# Patient Record
Sex: FEMALE | Race: OTHER | HISPANIC OR LATINO
[De-identification: names, ages, dates, MRNs, and addresses within clinical notes are randomized per-mention and may not be internally consistent; named-entity substitution may affect disease eponyms.]

---

## 2017-01-12 ENCOUNTER — APPOINTMENT (OUTPATIENT)
Dept: INTERNAL MEDICINE | Facility: CLINIC | Age: 47
End: 2017-01-12

## 2017-01-12 VITALS
OXYGEN SATURATION: 98 % | DIASTOLIC BLOOD PRESSURE: 78 MMHG | SYSTOLIC BLOOD PRESSURE: 108 MMHG | BODY MASS INDEX: 34.6 KG/M2 | WEIGHT: 188 LBS | TEMPERATURE: 98.3 F | HEART RATE: 82 BPM | HEIGHT: 62 IN

## 2017-01-12 LAB — HBA1C MFR BLD HPLC: 5.2

## 2017-01-24 ENCOUNTER — APPOINTMENT (OUTPATIENT)
Dept: PULMONOLOGY | Facility: CLINIC | Age: 47
End: 2017-01-24

## 2017-01-24 VITALS
HEIGHT: 62 IN | TEMPERATURE: 98.5 F | HEART RATE: 75 BPM | SYSTOLIC BLOOD PRESSURE: 100 MMHG | RESPIRATION RATE: 16 BRPM | DIASTOLIC BLOOD PRESSURE: 80 MMHG

## 2017-03-09 ENCOUNTER — APPOINTMENT (OUTPATIENT)
Dept: INTERNAL MEDICINE | Facility: CLINIC | Age: 47
End: 2017-03-09

## 2017-03-11 ENCOUNTER — EMERGENCY (EMERGENCY)
Facility: HOSPITAL | Age: 47
LOS: 1 days | Discharge: HOME | End: 2017-03-11
Attending: EMERGENCY MEDICINE | Admitting: EMERGENCY MEDICINE
Payer: MEDICARE

## 2017-03-11 VITALS
OXYGEN SATURATION: 100 % | RESPIRATION RATE: 16 BRPM | TEMPERATURE: 98 F | HEART RATE: 85 BPM | SYSTOLIC BLOOD PRESSURE: 111 MMHG | DIASTOLIC BLOOD PRESSURE: 76 MMHG

## 2017-03-11 PROCEDURE — 93010 ELECTROCARDIOGRAM REPORT: CPT

## 2017-03-11 PROCEDURE — 99284 EMERGENCY DEPT VISIT MOD MDM: CPT | Mod: 25

## 2017-03-11 RX ORDER — KETOROLAC TROMETHAMINE 30 MG/ML
15 SYRINGE (ML) INJECTION ONCE
Qty: 0 | Refills: 0 | Status: DISCONTINUED | OUTPATIENT
Start: 2017-03-11 | End: 2017-03-11

## 2017-03-11 RX ORDER — ACETAMINOPHEN 500 MG
650 TABLET ORAL ONCE
Qty: 0 | Refills: 0 | Status: DISCONTINUED | OUTPATIENT
Start: 2017-03-11 | End: 2017-03-11

## 2017-03-11 RX ORDER — IBUPROFEN 200 MG
600 TABLET ORAL ONCE
Qty: 0 | Refills: 0 | Status: DISCONTINUED | OUTPATIENT
Start: 2017-03-11 | End: 2017-03-11

## 2017-03-11 RX ADMIN — Medication 15 MILLIGRAM(S): at 10:27

## 2017-03-11 RX ADMIN — Medication 15 MILLIGRAM(S): at 10:02

## 2017-03-11 NOTE — ED PROVIDER NOTE - CHPI ED SYMPTOMS NEG
no fever/no vomiting/no tingling/no dizziness/no decreased eating/drinking/no numbness/no weakness/no chills/no nausea

## 2017-03-11 NOTE — ED PROVIDER NOTE - MUSCULOSKELETAL NEGATIVE STATEMENT, MLM
+ right shoulder pain, no back pain, no gout, no musculoskeletal pain, no neck pain, and no weakness.

## 2017-03-11 NOTE — ED PROVIDER NOTE - ATTENDING CONTRIBUTION TO CARE
attest I performed a face to face evaluation of this patient and obtained a history and performed a full exam.  I agree with the history, physical exam and plan of the PA.  pt with neck and shoulder pain, no cp, no sob.  HEENT atc, nl, right paraspinal neck pain, shoulder FROM, heart and lung wnl, 2+ pulses and neuro wnl.  Pain meds, EKG and reassess

## 2017-03-11 NOTE — ED PROVIDER NOTE - CARE PLAN
Principal Discharge DX:	Shoulder pain  Instructions for follow-up, activity and diet:	Rest, drink plenty of fluids.  Advance activity as tolerated.  Continue all previously prescribed medications as directed. You can use motrin 600mg every 6-8 hours for pain or fever, and/or Tylenol 650 mg every 4 hours for pain/fever. Follow up with your primary care physician in 48-72 hours- bring copies of your results.  Return to the emergency department for chest pain, shortness of breath, dizziness, or worsening, concerning, or persistent symptoms.

## 2017-03-11 NOTE — ED PROVIDER NOTE - OBJECTIVE STATEMENT
48 yo F pmhx of asthma, arthritis, "heart failure" here for R arm and shoulder pain x 6 days. Pt reports that 6 days ago she began to have R sided shoulder/neck pain. Cannot recall direct injury or trauma or change in activity.  Describes pain as throbbing, worse with movement, and tenderness to the "top" of her shoulder.  Has not taken any meds for the pain. Reports having a hx of chronic back pain which she has oxycodone for at home but has not needed to take the medication for this pain. Pt reports several months ago she had similar symptoms which were associated with weakness and she came to the ED with a negative work up and was told it was likely a nerve injury.  Denies other complaints. Denies fever, chills, vomiting, SOB, CP, weakness, HA, difficulty breathing, difficulty swallowing, numbness, tingling.

## 2017-03-11 NOTE — ED PROVIDER NOTE - PROGRESS NOTE DETAILS
BARBARA Adorno: l/m with Dr. Kemp office. Pt feelings improved, stable for discharge, will follow up with PMD/cadio as needed.

## 2017-03-11 NOTE — ED PROVIDER NOTE - PSH
Fracture of left foot  Four surgeries: #1 and #2 in 2004, #3 ~2010, and #4 in 2011.  Gastric bypass status for obesity    H/O tubal ligation

## 2017-03-11 NOTE — ED PROVIDER NOTE - MEDICAL DECISION MAKING DETAILS
46 yo F pmhx of arthritis, CHF, asthma here for R shoulder pain x 6 days. otherwise well. no evidence of septic arthritis, infection. no evidence of direct trauma or injury. normal exam other than mild TTP over posterior aspect of R shoulder. Pt otherwise well. Has good PMD and Cardio follow up. PLAN: 46 yo F pmhx of arthritis, CHF, asthma here for R shoulder pain x 6 days. otherwise well. no evidence of septic arthritis, infection. no evidence of direct trauma or injury. normal exam other than mild TTP over posterior aspect of R shoulder. Pt otherwise well.  Although pt has hx of CHF, patient has no symptoms or SOB or CP at this time. Pain is reproducible likely msk. Has good PMD and Cardio follow up. PLAN: ekg, NSAIDs, PMD follow up 46 yo F pmhx of arthritis, CHF, asthma here for R shoulder pain x 6 days. otherwise well. no evidence of septic arthritis, infection. no evidence of direct trauma or injury. normal exam other than mild TTP over posterior aspect of R shoulder. Pt otherwise well.  Although pt has hx of CHF, patient has no symptoms or SOB or CP at this time. Pain is reproducible likely msk. Has good PMD and Cardio follow up. EKG WNL. PLAN: ekg, NSAIDs, PMD follow up

## 2017-03-11 NOTE — ED PROVIDER NOTE - PHYSICAL EXAMINATION
R shoulder: mild TTP over posterior shoulder, FROM, NVI, sensate intact, strength 5/5, negative drop arm test.    NEURO: EOMi, PERRLA, visual fields intact, tongue midline, negative romberg, strength 5/5 UE and LE bilaterally, normal gait, facial expressions intact, point to point intact, rapid alternating movements intact, sensate intact to UE and LE bilaterally. NVI R shoulder: mild TTP over posterior shoulder, FROM, NVI, sensate intact, strength 5/5, negative drop arm test. No swelling/erythema. No ecchymosis.     NEURO: EOMi, PERRLA, visual fields intact, tongue midline, negative romberg, strength 5/5 UE and LE bilaterally, normal gait, facial expressions intact, point to point intact, rapid alternating movements intact, sensate intact to UE and LE bilaterally. NVI

## 2017-03-11 NOTE — ED ADULT TRIAGE NOTE - CHIEF COMPLAINT QUOTE
Patient c/o pain in her right upper arm that goes up to her shoulder and neck area. Denies any trauma or unusual activity with her arm. The pain started 6 days ago but it is getting worse.

## 2017-03-15 ENCOUNTER — MEDICATION RENEWAL (OUTPATIENT)
Age: 47
End: 2017-03-15

## 2017-04-27 ENCOUNTER — APPOINTMENT (OUTPATIENT)
Dept: INTERNAL MEDICINE | Facility: CLINIC | Age: 47
End: 2017-04-27

## 2017-04-27 ENCOUNTER — LABORATORY RESULT (OUTPATIENT)
Age: 47
End: 2017-04-27

## 2017-04-27 VITALS
WEIGHT: 187 LBS | DIASTOLIC BLOOD PRESSURE: 60 MMHG | BODY MASS INDEX: 34.41 KG/M2 | HEIGHT: 62 IN | TEMPERATURE: 98.4 F | SYSTOLIC BLOOD PRESSURE: 104 MMHG | HEART RATE: 77 BPM | RESPIRATION RATE: 14 BRPM

## 2017-04-27 DIAGNOSIS — M25.519 PAIN IN UNSPECIFIED SHOULDER: ICD-10-CM

## 2017-04-28 LAB
25(OH)D3 SERPL-MCNC: 26.4 NG/ML
ALBUMIN SERPL ELPH-MCNC: 4 G/DL
ALP BLD-CCNC: 77 U/L
ALT SERPL-CCNC: 21 U/L
ANION GAP SERPL CALC-SCNC: 15 MMOL/L
APPEARANCE: CLEAR
AST SERPL-CCNC: 23 U/L
B BURGDOR IGG+IGM SER QL IB: NORMAL
BASOPHILS # BLD AUTO: 0.02 K/UL
BASOPHILS NFR BLD AUTO: 0.3 %
BILIRUB SERPL-MCNC: 0.2 MG/DL
BILIRUBIN URINE: NEGATIVE
BLOOD URINE: NEGATIVE
BUN SERPL-MCNC: 9 MG/DL
CALCIUM SERPL-MCNC: 9.4 MG/DL
CCP AB SER IA-ACNC: <8 UNITS
CHLORIDE SERPL-SCNC: 101 MMOL/L
CHOLEST SERPL-MCNC: 178 MG/DL
CHOLEST/HDLC SERPL: 2.5 RATIO
CO2 SERPL-SCNC: 24 MMOL/L
COLOR: YELLOW
CREAT SERPL-MCNC: 0.73 MG/DL
CRP SERPL HS-MCNC: 5.2 MG/L
DSDNA AB SER-ACNC: <12 IU/ML
EOSINOPHIL # BLD AUTO: 0.1 K/UL
EOSINOPHIL NFR BLD AUTO: 1.3 %
ERYTHROCYTE [SEDIMENTATION RATE] IN BLOOD BY WESTERGREN METHOD: 21 MM/HR
GLUCOSE QUALITATIVE U: NORMAL MG/DL
GLUCOSE SERPL-MCNC: 93 MG/DL
HBA1C MFR BLD HPLC: 5.5 %
HBV SURFACE AB SER QL: NONREACTIVE
HBV SURFACE AG SER QL: NONREACTIVE
HCT VFR BLD CALC: 41.2 %
HCV AB SER QL: NONREACTIVE
HCV S/CO RATIO: 0.1 S/CO
HDLC SERPL-MCNC: 70 MG/DL
HGB BLD-MCNC: 13 G/DL
HIV1+2 AB SPEC QL IA.RAPID: NONREACTIVE
IMM GRANULOCYTES NFR BLD AUTO: 0 %
KETONES URINE: NEGATIVE
LDLC SERPL CALC-MCNC: 89 MG/DL
LEUKOCYTE ESTERASE URINE: NEGATIVE
LYMPHOCYTES # BLD AUTO: 2.11 K/UL
LYMPHOCYTES NFR BLD AUTO: 26.9 %
MAN DIFF?: NORMAL
MCHC RBC-ENTMCNC: 27.8 PG
MCHC RBC-ENTMCNC: 31.6 GM/DL
MCV RBC AUTO: 88 FL
MONOCYTES # BLD AUTO: 0.51 K/UL
MONOCYTES NFR BLD AUTO: 6.5 %
NEUTROPHILS # BLD AUTO: 5.11 K/UL
NEUTROPHILS NFR BLD AUTO: 65 %
NITRITE URINE: NEGATIVE
PH URINE: 5.5
PLATELET # BLD AUTO: 380 K/UL
POTASSIUM SERPL-SCNC: 4.5 MMOL/L
PROT SERPL-MCNC: 7.3 G/DL
PROTEIN URINE: NEGATIVE MG/DL
RBC # BLD: 4.68 M/UL
RBC # FLD: 13.6 %
RF+CCP IGG SER-IMP: NEGATIVE
RHEUMATOID FACT SER QL: 7.1 IU/ML
SODIUM SERPL-SCNC: 140 MMOL/L
SPECIFIC GRAVITY URINE: 1.03
T PALLIDUM AB SER QL IA: NEGATIVE
TRIGL SERPL-MCNC: 97 MG/DL
TSH SERPL-ACNC: 0.68 UIU/ML
UROBILINOGEN URINE: NORMAL MG/DL
VIT B12 SERPL-MCNC: 558 PG/ML
WBC # FLD AUTO: 7.85 K/UL

## 2017-05-01 LAB
ANA SER IF-ACNC: NEGATIVE
C TRACH RRNA SPEC QL NAA+PROBE: NORMAL
N GONORRHOEA RRNA SPEC QL NAA+PROBE: NORMAL
SOURCE AMPLIFICATION: NORMAL

## 2017-05-31 ENCOUNTER — FORM ENCOUNTER (OUTPATIENT)
Age: 47
End: 2017-05-31

## 2017-06-01 ENCOUNTER — APPOINTMENT (OUTPATIENT)
Dept: MRI IMAGING | Facility: IMAGING CENTER | Age: 47
End: 2017-06-01

## 2017-06-01 ENCOUNTER — APPOINTMENT (OUTPATIENT)
Dept: RADIOLOGY | Facility: IMAGING CENTER | Age: 47
End: 2017-06-01

## 2017-06-01 ENCOUNTER — OUTPATIENT (OUTPATIENT)
Dept: OUTPATIENT SERVICES | Facility: HOSPITAL | Age: 47
LOS: 1 days | End: 2017-06-01
Payer: MEDICAID

## 2017-06-01 ENCOUNTER — APPOINTMENT (OUTPATIENT)
Dept: MAMMOGRAPHY | Facility: IMAGING CENTER | Age: 47
End: 2017-06-01

## 2017-06-01 DIAGNOSIS — M51.26 OTHER INTERVERTEBRAL DISC DISPLACEMENT, LUMBAR REGION: ICD-10-CM

## 2017-06-01 DIAGNOSIS — Z00.00 ENCOUNTER FOR GENERAL ADULT MEDICAL EXAMINATION WITHOUT ABNORMAL FINDINGS: ICD-10-CM

## 2017-06-01 DIAGNOSIS — M51.36 OTHER INTERVERTEBRAL DISC DEGENERATION, LUMBAR REGION: ICD-10-CM

## 2017-06-01 DIAGNOSIS — M25.551 PAIN IN RIGHT HIP: ICD-10-CM

## 2017-06-01 DIAGNOSIS — Z12.31 ENCOUNTER FOR SCREENING MAMMOGRAM FOR MALIGNANT NEOPLASM OF BREAST: ICD-10-CM

## 2017-06-01 DIAGNOSIS — M47.816 SPONDYLOSIS WITHOUT MYELOPATHY OR RADICULOPATHY, LUMBAR REGION: ICD-10-CM

## 2017-06-01 DIAGNOSIS — M25.552 PAIN IN LEFT HIP: ICD-10-CM

## 2017-06-01 DIAGNOSIS — N64.89 OTHER SPECIFIED DISORDERS OF BREAST: ICD-10-CM

## 2017-06-01 PROCEDURE — 73521 X-RAY EXAM HIPS BI 2 VIEWS: CPT

## 2017-06-01 PROCEDURE — 77067 SCR MAMMO BI INCL CAD: CPT

## 2017-06-01 PROCEDURE — 73564 X-RAY EXAM KNEE 4 OR MORE: CPT

## 2017-06-01 PROCEDURE — 72148 MRI LUMBAR SPINE W/O DYE: CPT

## 2017-06-01 PROCEDURE — 77063 BREAST TOMOSYNTHESIS BI: CPT

## 2017-06-01 PROCEDURE — 76641 ULTRASOUND BREAST COMPLETE: CPT

## 2017-09-05 ENCOUNTER — MEDICATION RENEWAL (OUTPATIENT)
Age: 47
End: 2017-09-05

## 2018-05-02 ENCOUNTER — EMERGENCY (EMERGENCY)
Facility: HOSPITAL | Age: 48
LOS: 1 days | Discharge: HOME | End: 2018-05-02
Attending: EMERGENCY MEDICINE | Admitting: EMERGENCY MEDICINE
Payer: MEDICARE

## 2018-05-02 ENCOUNTER — APPOINTMENT (OUTPATIENT)
Dept: PULMONOLOGY | Facility: CLINIC | Age: 48
End: 2018-05-02

## 2018-05-02 VITALS
SYSTOLIC BLOOD PRESSURE: 122 MMHG | RESPIRATION RATE: 18 BRPM | DIASTOLIC BLOOD PRESSURE: 83 MMHG | HEART RATE: 101 BPM | TEMPERATURE: 98 F | OXYGEN SATURATION: 99 %

## 2018-05-02 PROCEDURE — 73610 X-RAY EXAM OF ANKLE: CPT | Mod: 26,RT

## 2018-05-02 PROCEDURE — 99283 EMERGENCY DEPT VISIT LOW MDM: CPT

## 2018-05-02 PROCEDURE — 73110 X-RAY EXAM OF WRIST: CPT | Mod: 26,RT

## 2018-05-02 NOTE — ED PROVIDER NOTE - MEDICAL DECISION MAKING DETAILS
47 y/o F w/ right ankle and right wrist pain s/p mechanical fall yesterday. Will obtain XR to r/o fracture, pain control and reassess.

## 2018-05-02 NOTE — ED PROVIDER NOTE - PROGRESS NOTE DETAILS
BARBARA Florentino: Discussed the results of the imaging with the patient. Placed aircast. Advised to f/u with PCP. Pt ok for dc. BARBARA Florentino: Discussed the results of the imaging with the patient. Placed aircast - gave cane. Advised to f/u with PCP. Pt ok for dc. Pt ambulating with cane.

## 2018-05-02 NOTE — ED PROVIDER NOTE - OBJECTIVE STATEMENT
47 y/o F w/ CHF, asthma and migraines, presents to the ED c/o right ankle and right wrist pain and abrasions to left knee s/p trip and fall yesterday. Pt states she was walking on the sidewalk, tripped over uneven elyssa and fell onto outstretched right hand and left knee. Pt is currently ambulatory. Pt is right hand dominant. Denies LOC, head trauma or any other complaints.

## 2018-05-02 NOTE — ED PROVIDER NOTE - CARE PLAN
Principal Discharge DX:	Ankle sprain  Secondary Diagnosis:	Wrist sprain, right, initial encounter  Secondary Diagnosis:	Fall Principal Discharge DX:	Ankle sprain  Assessment and plan of treatment:	Limit further injury, over exertion, and rest affected area. Take motrin 600mg every 6h as needed for pain. Please continue all home medications as directed. See your regular doctor within 72 hours for follow-up care. Return to ER for new or worsening symptoms.  Secondary Diagnosis:	Wrist sprain, right, initial encounter  Secondary Diagnosis:	Fall

## 2018-05-02 NOTE — ED ADULT TRIAGE NOTE - CHIEF COMPLAINT QUOTE
pt tripped on side walk yesterday landing on her right arm. Twisted right ankle and reports right wrist pain. no swelling or deformity to wrist noted.

## 2018-05-10 ENCOUNTER — APPOINTMENT (OUTPATIENT)
Dept: INTERNAL MEDICINE | Facility: CLINIC | Age: 48
End: 2018-05-10

## 2018-10-19 ENCOUNTER — APPOINTMENT (OUTPATIENT)
Dept: INTERNAL MEDICINE | Facility: CLINIC | Age: 48
End: 2018-10-19

## 2018-10-25 ENCOUNTER — APPOINTMENT (OUTPATIENT)
Dept: INTERNAL MEDICINE | Facility: CLINIC | Age: 48
End: 2018-10-25
Payer: MEDICARE

## 2018-10-25 VITALS
HEIGHT: 62 IN | BODY MASS INDEX: 35.7 KG/M2 | DIASTOLIC BLOOD PRESSURE: 74 MMHG | HEART RATE: 106 BPM | WEIGHT: 194 LBS | TEMPERATURE: 97.8 F | OXYGEN SATURATION: 98 % | SYSTOLIC BLOOD PRESSURE: 112 MMHG

## 2018-10-25 DIAGNOSIS — M54.5 LOW BACK PAIN: ICD-10-CM

## 2018-10-25 PROCEDURE — 99214 OFFICE O/P EST MOD 30 MIN: CPT

## 2018-10-25 NOTE — HISTORY OF PRESENT ILLNESS
[FreeTextEntry1] : came in as acute \par was last seen 4/2017  [de-identified] : right upper back and shoulder pain - lower back chronic with acute exacerbation on rt side where she cannot lift her rt shoulder \par - no radiation \par - also had chest discomfort saw her cardio Dr Víctor licea 2 weeks ago was told it due to withdrawal from her oxycodone - she was getting oxycodone from her pain management that closed down Dr Mariely Chavez [phone] - called office no answer \par - she was on oxycodone 30 mg was taking 3 a day -[facility] checked last fill 10/5 28 pills for 7 days \par -as per letter dated 1/10/2017  from pain management she was treated for multiple DJD joints disease /OA b/l hip , knee , left ankle , lumbar spine radiculopathy , spinal stenosis , neurogenic claudications \par \par depression \par - stopped Zoloft 3 yrs - taking xanax 2 mg from cardio for anxiety -  feeling much better\par -due to older son with ADHD and social security disability due to back - she tried going back to work acces a ride - her back gave way again after she wheeled a pt , her pain management cleared her for work with restriction and they don’t want restriction \par -crying a lot does not want to go out , wants to be alone \par -she was on Zoloft in past it worked for her \par her cardio put her on xanax due to anxiety \par \par lower back pain - needs referral to pain management taking oxycodone for pain, not on  gabapentin now \par \par h/o unstable angina / cardiomyopathy-- followed by cardiologist Dr Víctor Garzon at [facility] , last visit was 2 weeks ago  - records requested \par -on Core[facility] after pulmonary evaluation, \par -does get chest pains in extreme stressed situations , has family stress- elder son ADHD and bipolar not getting treatments she got order of protection against him.. \par \par h/o Abnormal mammo h/o rt breast core biopsy. 2014-- did mammo 4/2016 stable - referral given today \par \par Morbid Obesity- did gastric bypass in November 2013 , after lost 42 lbs since. Her asthma, arthritis, and sleeping is much better since her weight loss.\par \par Asthma - acting up due to weather , feels better since gastric bypass surgery, using inhaler less frequent , needs refill for Ventolin , had to use it today due to allergies , no exacerbation since. \par \par h/o obstructive sleep apnea- saw sleep  , using C-PAP with oxygen. feels much better and sleeps comfortably with the machine \par -saw pulmonary in past 1/2017  they changed setting of CPAP -uses it daily , feels better now \par \par prediabetic- controlled now , has lost 40 pounds since, and watching diet, needs glucometer \par \par

## 2018-10-25 NOTE — ASSESSMENT
[FreeTextEntry1] : right upper back and shoulder pain - lower back chronic with acute exacerbation on rt side where she cannot lift her rt shoulder \par - also had chest discomfort saw her cardio Dr Víctor licea 2 weeks ago was told it due to withdrawal from her oxycodone - she was getting oxycodone from her pain management that closed down Dr Mariely Chavez [phone] - called office no answer \par - she was on oxycodone 30 mg was taking 3 a day -[facility] checked last fill 10/5 28 pills for 7 days \par Lower back pain - referral given pain management - - referral given Pt referral given \par - rx renewed for 7 days # 30 pills given after checking [facility] - pt to schedule appt with pain management \par \par Moderate depression -better now off zoloft \par - getting xanax 2 mg from cardiologist \par -Denies homicidal or suicidal ideas or thoughts, denies any side effects from the medications \par -Discussed with patient in detail side effects of all medications, hand out given, advise patient to inform family member that he or she is taking the medication and to watch out for any personality changes, to seek medical attention immediately if they notice any difference. \par -Make appointment to see psychiatrist and therapist on a regular basis referral given.\par - also takes Ambien 10 mg  as needed for sleep \par \par Predm-  - educated weight loss and low carbohydrate diet and increasing physical activity , \par \par sleep apnea- much better since bariatric surgery, and use of CPAP machine at night, discussed compliance with use of CPAP machine, educated patient risk of apneic episodes, and the consequences on heart sudden death etc. patient agrees to be compliant with her CPAP machine.\par \par h/o Abnormal mammo s/p rt breast core biopsy 2014.-f/u mammo stable , last mammo 4/2016 stable referral given again  \par \par Morbid Obesity- s/p gastric bypass, Her asthma, arthritis, and sleeping is much better since her weight loss. taking Ambien for sleep \par \par Asthma - stable on current medications, refill Ventolin and Singulair. \par \par vitamin D deficiency-. Advise patient to take supplements, rx given \par \par Cardiomyopathy- on medical management ,followed by cardio , saw last month , stable \par \par Health maintenance\par Tetanus vaccine- 2013\par Pneumonia vaccine 2012\par Mammogram- 4/2016, referral given \par Pap smear-6/2016 neg\par std - neg 3/2016\par refused flu vaccine. \par \par rtc cpe

## 2018-10-25 NOTE — REVIEW OF SYSTEMS
[Joint Pain] : joint pain [Joint Stiffness] : joint stiffness [Negative] : Gastrointestinal [FreeTextEntry9] : see HPI

## 2018-10-25 NOTE — PHYSICAL EXAM
[No Acute Distress] : no acute distress [Well Nourished] : well nourished [Well Developed] : well developed [No Respiratory Distress] : no respiratory distress  [Clear to Auscultation] : lungs were clear to auscultation bilaterally [No Accessory Muscle Use] : no accessory muscle use [Normal Rate] : normal rate  [Regular Rhythm] : with a regular rhythm [Normal S1, S2] : normal S1 and S2 [Soft] : abdomen soft [Non Tender] : non-tender [Normal Bowel Sounds] : normal bowel sounds [de-identified] : rt upper back tenderness with rom rt shoulder restricted to abduction beyond 40 degree

## 2018-11-20 ENCOUNTER — APPOINTMENT (OUTPATIENT)
Dept: PULMONOLOGY | Facility: CLINIC | Age: 48
End: 2018-11-20

## 2019-02-14 ENCOUNTER — APPOINTMENT (OUTPATIENT)
Dept: INTERNAL MEDICINE | Facility: CLINIC | Age: 49
End: 2019-02-14

## 2019-03-26 ENCOUNTER — APPOINTMENT (OUTPATIENT)
Dept: PULMONOLOGY | Facility: CLINIC | Age: 49
End: 2019-03-26

## 2019-06-12 ENCOUNTER — APPOINTMENT (OUTPATIENT)
Dept: INTERNAL MEDICINE | Facility: CLINIC | Age: 49
End: 2019-06-12

## 2022-02-24 NOTE — ED PROVIDER NOTE - NS_EDPROVIDERDISPOUSERTYPE_ED_A_ED
SPOKE TO [facility] REGARDING HOMERO'S SURGERY RESCHEDULE    SURGERY 3/31/22 @ 800  COVID - 3/25/22 @ 815  PO- 4/7/22 @ 100    IF PT MISSES COVID AGAIN.  PT WILL NEED APPT WITH DR KLEIN BEFORE ANY RESCHEDULE Scribe Attestation (For Scribes USE Only)... Attending Attestation (For Attendings USE Only).../Scribe Attestation (For Scribes USE Only)...

## 2022-12-14 ENCOUNTER — EMERGENCY (EMERGENCY)
Facility: HOSPITAL | Age: 52
LOS: 1 days | Discharge: HOME | End: 2022-12-14
Attending: EMERGENCY MEDICINE | Admitting: EMERGENCY MEDICINE

## 2022-12-14 VITALS
DIASTOLIC BLOOD PRESSURE: 91 MMHG | TEMPERATURE: 98 F | OXYGEN SATURATION: 100 % | HEART RATE: 100 BPM | SYSTOLIC BLOOD PRESSURE: 130 MMHG | RESPIRATION RATE: 22 BRPM

## 2022-12-14 PROCEDURE — 99285 EMERGENCY DEPT VISIT HI MDM: CPT

## 2022-12-14 NOTE — ED ADULT TRIAGE NOTE - CHIEF COMPLAINT QUOTE
abdominal pain and constipation since Saturday, not eating as she feels something is bothering her throat. As per  pt is very anxious and worried about everything and her grand children, c/o numbness and tingling to her hands and legs for a week.  Denies fever or chest pain or SOB. Gastric bypass in 2007, CHF abdominal pain and constipation since Saturday, not eating as she feels something is bothering her throat. As per  pt is very anxious and worried about everything and her grand children, c/o numbness and tingling to her hands and legs for a week.  Denies fever or chest pain or SOB. Gastric bypass in 2007, CHF, Depression, Anxiety.

## 2022-12-15 VITALS
SYSTOLIC BLOOD PRESSURE: 127 MMHG | DIASTOLIC BLOOD PRESSURE: 71 MMHG | TEMPERATURE: 99 F | HEART RATE: 87 BPM | OXYGEN SATURATION: 100 % | RESPIRATION RATE: 18 BRPM

## 2022-12-15 LAB
ALBUMIN SERPL ELPH-MCNC: 4.2 G/DL — SIGNIFICANT CHANGE UP (ref 3.3–5)
ALP SERPL-CCNC: 90 U/L — SIGNIFICANT CHANGE UP (ref 40–120)
ALT FLD-CCNC: 15 U/L — SIGNIFICANT CHANGE UP (ref 4–33)
AMPHET UR-MCNC: NEGATIVE — SIGNIFICANT CHANGE UP
ANION GAP SERPL CALC-SCNC: 11 MMOL/L — SIGNIFICANT CHANGE UP (ref 7–14)
ANISOCYTOSIS BLD QL: SLIGHT — SIGNIFICANT CHANGE UP
APAP SERPL-MCNC: <10 UG/ML — LOW (ref 15–25)
APPEARANCE UR: ABNORMAL
AST SERPL-CCNC: 18 U/L — SIGNIFICANT CHANGE UP (ref 4–32)
BACTERIA # UR AUTO: ABNORMAL
BARBITURATES UR SCN-MCNC: NEGATIVE — SIGNIFICANT CHANGE UP
BASOPHILS # BLD AUTO: 0.07 K/UL — SIGNIFICANT CHANGE UP (ref 0–0.2)
BASOPHILS NFR BLD AUTO: 0.9 % — SIGNIFICANT CHANGE UP (ref 0–2)
BENZODIAZ UR-MCNC: POSITIVE
BILIRUB SERPL-MCNC: 0.4 MG/DL — SIGNIFICANT CHANGE UP (ref 0.2–1.2)
BILIRUB UR-MCNC: ABNORMAL
BUN SERPL-MCNC: 11 MG/DL — SIGNIFICANT CHANGE UP (ref 7–23)
CALCIUM SERPL-MCNC: 9.5 MG/DL — SIGNIFICANT CHANGE UP (ref 8.4–10.5)
CHLORIDE SERPL-SCNC: 103 MMOL/L — SIGNIFICANT CHANGE UP (ref 98–107)
CO2 SERPL-SCNC: 22 MMOL/L — SIGNIFICANT CHANGE UP (ref 22–31)
COCAINE METAB.OTHER UR-MCNC: NEGATIVE — SIGNIFICANT CHANGE UP
COLOR SPEC: YELLOW — SIGNIFICANT CHANGE UP
CREAT SERPL-MCNC: 0.66 MG/DL — SIGNIFICANT CHANGE UP (ref 0.5–1.3)
CREATININE URINE RESULT, DAU: 552 MG/DL — SIGNIFICANT CHANGE UP
DIFF PNL FLD: NEGATIVE — SIGNIFICANT CHANGE UP
EGFR: 107 ML/MIN/1.73M2 — SIGNIFICANT CHANGE UP
EOSINOPHIL # BLD AUTO: 0 K/UL — SIGNIFICANT CHANGE UP (ref 0–0.5)
EOSINOPHIL NFR BLD AUTO: 0 % — SIGNIFICANT CHANGE UP (ref 0–6)
EPI CELLS # UR: 20 /HPF — HIGH (ref 0–5)
ETHANOL SERPL-MCNC: <10 MG/DL — SIGNIFICANT CHANGE UP
FLUAV AG NPH QL: SIGNIFICANT CHANGE UP
FLUBV AG NPH QL: SIGNIFICANT CHANGE UP
GIANT PLATELETS BLD QL SMEAR: PRESENT — SIGNIFICANT CHANGE UP
GLUCOSE SERPL-MCNC: 116 MG/DL — HIGH (ref 70–99)
GLUCOSE UR QL: ABNORMAL
HCG SERPL-ACNC: <5 MIU/ML — SIGNIFICANT CHANGE UP
HCT VFR BLD CALC: 32.3 % — LOW (ref 34.5–45)
HGB BLD-MCNC: 9.6 G/DL — LOW (ref 11.5–15.5)
HYALINE CASTS # UR AUTO: 2 /LPF — SIGNIFICANT CHANGE UP (ref 0–7)
HYPOCHROMIA BLD QL: SIGNIFICANT CHANGE UP
IANC: 4.85 K/UL — SIGNIFICANT CHANGE UP (ref 1.8–7.4)
KETONES UR-MCNC: ABNORMAL
LEUKOCYTE ESTERASE UR-ACNC: ABNORMAL
LYMPHOCYTES # BLD AUTO: 2.51 K/UL — SIGNIFICANT CHANGE UP (ref 1–3.3)
LYMPHOCYTES # BLD AUTO: 31.3 % — SIGNIFICANT CHANGE UP (ref 13–44)
MANUAL SMEAR VERIFICATION: SIGNIFICANT CHANGE UP
MCHC RBC-ENTMCNC: 21.6 PG — LOW (ref 27–34)
MCHC RBC-ENTMCNC: 29.7 GM/DL — LOW (ref 32–36)
MCV RBC AUTO: 72.6 FL — LOW (ref 80–100)
METHADONE UR-MCNC: NEGATIVE — SIGNIFICANT CHANGE UP
MICROCYTES BLD QL: SIGNIFICANT CHANGE UP
MONOCYTES # BLD AUTO: 0.62 K/UL — SIGNIFICANT CHANGE UP (ref 0–0.9)
MONOCYTES NFR BLD AUTO: 7.8 % — SIGNIFICANT CHANGE UP (ref 2–14)
NEUTROPHILS # BLD AUTO: 4.32 K/UL — SIGNIFICANT CHANGE UP (ref 1.8–7.4)
NEUTROPHILS NFR BLD AUTO: 53.9 % — SIGNIFICANT CHANGE UP (ref 43–77)
NITRITE UR-MCNC: NEGATIVE — SIGNIFICANT CHANGE UP
OPIATES UR-MCNC: NEGATIVE — SIGNIFICANT CHANGE UP
OXYCODONE UR-MCNC: NEGATIVE — SIGNIFICANT CHANGE UP
PCP SPEC-MCNC: SIGNIFICANT CHANGE UP
PCP UR-MCNC: NEGATIVE — SIGNIFICANT CHANGE UP
PH UR: 6 — SIGNIFICANT CHANGE UP (ref 5–8)
PLAT MORPH BLD: NORMAL — SIGNIFICANT CHANGE UP
PLATELET # BLD AUTO: 443 K/UL — HIGH (ref 150–400)
PLATELET COUNT - ESTIMATE: NORMAL — SIGNIFICANT CHANGE UP
POLYCHROMASIA BLD QL SMEAR: SLIGHT — SIGNIFICANT CHANGE UP
POTASSIUM SERPL-MCNC: 3.6 MMOL/L — SIGNIFICANT CHANGE UP (ref 3.5–5.3)
POTASSIUM SERPL-SCNC: 3.6 MMOL/L — SIGNIFICANT CHANGE UP (ref 3.5–5.3)
PROT SERPL-MCNC: 8.4 G/DL — HIGH (ref 6–8.3)
PROT UR-MCNC: ABNORMAL
RBC # BLD: 4.45 M/UL — SIGNIFICANT CHANGE UP (ref 3.8–5.2)
RBC # FLD: 16 % — HIGH (ref 10.3–14.5)
RBC BLD AUTO: ABNORMAL
RBC CASTS # UR COMP ASSIST: 9 /HPF — HIGH (ref 0–4)
RSV RNA NPH QL NAA+NON-PROBE: SIGNIFICANT CHANGE UP
SALICYLATES SERPL-MCNC: <0.3 MG/DL — LOW (ref 15–30)
SARS-COV-2 RNA SPEC QL NAA+PROBE: SIGNIFICANT CHANGE UP
SODIUM SERPL-SCNC: 136 MMOL/L — SIGNIFICANT CHANGE UP (ref 135–145)
SP GR SPEC: 1.04 — SIGNIFICANT CHANGE UP (ref 1.01–1.05)
THC UR QL: POSITIVE
TOXICOLOGY SCREEN, DRUGS OF ABUSE, SERUM RESULT: SIGNIFICANT CHANGE UP
TSH SERPL-MCNC: 0.9 UIU/ML — SIGNIFICANT CHANGE UP (ref 0.27–4.2)
UROBILINOGEN FLD QL: ABNORMAL
VARIANT LYMPHS # BLD: 6.1 % — HIGH (ref 0–6)
WBC # BLD: 8.01 K/UL — SIGNIFICANT CHANGE UP (ref 3.8–10.5)
WBC # FLD AUTO: 8.01 K/UL — SIGNIFICANT CHANGE UP (ref 3.8–10.5)
WBC UR QL: 9 /HPF — HIGH (ref 0–5)

## 2022-12-15 PROCEDURE — 71045 X-RAY EXAM CHEST 1 VIEW: CPT | Mod: 26

## 2022-12-15 PROCEDURE — 70450 CT HEAD/BRAIN W/O DYE: CPT | Mod: 26,MA

## 2022-12-15 PROCEDURE — 74177 CT ABD & PELVIS W/CONTRAST: CPT | Mod: 26,MA

## 2022-12-15 RX ORDER — SODIUM CHLORIDE 9 MG/ML
1000 INJECTION INTRAMUSCULAR; INTRAVENOUS; SUBCUTANEOUS ONCE
Refills: 0 | Status: COMPLETED | OUTPATIENT
Start: 2022-12-15 | End: 2022-12-15

## 2022-12-15 RX ADMIN — Medication 2 MILLIGRAM(S): at 01:47

## 2022-12-15 RX ADMIN — SODIUM CHLORIDE 1000 MILLILITER(S): 9 INJECTION INTRAMUSCULAR; INTRAVENOUS; SUBCUTANEOUS at 01:47

## 2022-12-15 NOTE — ED PROVIDER NOTE - NSICDXPASTSURGICALHX_GEN_ALL_CORE_FT
PAST SURGICAL HISTORY:  Fracture of left foot Four surgeries: #1 and #2 in 2004, #3 ~2010, and #4 in 2011.    Gastric bypass status for obesity     H/O tubal ligation

## 2022-12-15 NOTE — ED PROVIDER NOTE - OBJECTIVE STATEMENT
52-year-old female with history of hypertension, anxiety/depression on trazodone, status post gastric bypass unknown year and hospital that is brought in with  at bedside for abdominal pain and constipation.  Patient reports left-sided pain but unable to provide further history at this time.  Per  patient started complaining of having constipation since Saturday approximately 5 days ago and has not been eating normally since that time but has been tolerating p.o.  He states when he brought patient to the hospital she was acting normally but while in waiting room started acting abnormally and appears confused to him as well as altered compared to her baseline mental status.  Patient  states that in January 2022 she did have a period where she was apneic due to multiple medication ingestions that were not known to be intentional.  Daughter and  started CPR and called EMS and states that since that time patient has been abnormal in her mentation but has never acted how she is at this time.   denies any drinking, smoking, drugs other than the trazodone that she takes that she did not take tonight.  No recent fevers, illnesses, chest pain, shortness of breath, diarrhea, urinary symptoms recently per .     Jose Medellin @ [phone]

## 2022-12-15 NOTE — ED PROVIDER NOTE - PATIENT PORTAL LINK FT
You can access the FollowMyHealth Patient Portal offered by [facility] by registering at the following website: [email or web address]. By joining [facility]’s FollowMyHealth portal, you will also be able to view your health information using other applications (apps) compatible with our system.

## 2022-12-15 NOTE — ED PROVIDER NOTE - CLINICAL SUMMARY MEDICAL DECISION MAKING FREE TEXT BOX
52-year-old female with history of anxiety and depression and gastric bypass that is here for initially constipation but now found to be altered and not really able to provide further history.  Per  at bedside this is new for patient and she has never been this way.  Denies any smoking, drinking, drugs.  Has not taken her trazodone for a few days per her .  Patient abdomen is soft and nontender and rectal exam shows no stool in the vault.  Was supposedly given an enema at her PMD office today and passed only a small amount of stool.  At this time we will have to work-up for cause of altered mental status which includes infectious etiologies, possible intoxication, possible CVA versus mass in the brain, versus dehydration and malnutrition.  Will obtain labs and imaging at this time but patient is unwilling to provide blood.  For patient's safety we will have to chemically sedate as patient is agitated and there is concern for her own safety as patient might try to elope from the hospital while altered.  Will place on one-to-one constant observation as well for safety. 52-year-old female with history of anxiety and depression and gastric bypass that is here for initially constipation but now found to be altered and not really able to provide further history.  Per  at bedside this is new for patient and she has never been this way.  Denies any smoking, drinking, drugs.  Has not taken her trazodone for a few days per her .  Patient abdomen is soft and nontender and rectal exam shows no stool in the vault.  Was supposedly given an enema at her PMD office today and passed only a small amount of stool.  At this time we will have to work-up for cause of altered mental status which includes infectious etiologies, possible intoxication, possible CVA versus mass in the brain, versus dehydration and malnutrition and also considered hypoglycemia but unlikely as FS in triage 126..  Will obtain labs and imaging at this time but patient is unwilling to provide blood.  For patient's safety we will have to chemically sedate as patient is agitated and there is concern for her own safety as patient might try to elope from the hospital while altered.  Will place on one-to-one constant observation as well for safety.

## 2022-12-15 NOTE — ED PROVIDER NOTE - NSFOLLOWUPINSTRUCTIONS_ED_ALL_ED_FT
You were seen in the emergency department for abdominal pain and we found you to have altered mental state.      We obtained blood work and imaging which did not show any acute findings that require you to be hospitalized for this.  You are now back to your baseline mental status.      We are unsure what caused your change in mental status but at this time since you are back to normal we feel that it is safe to be discharged you with your  back home to follow-up with your primary care doctor as an outpatient.      Please take all the imaging and lab results that we obtained today in the ED and follow-up with your primary care doctor closely.  If any of your conditions return or worsen please come back to the hospital for further imaging or treatments.  Please abstain from any medications that may have caused you to be altered.  If you start having nausea vomiting, fevers chills then return to the hospital immediately as well.

## 2022-12-15 NOTE — ED PROVIDER NOTE - PROGRESS NOTE DETAILS
Patient was reassessed as all her labs and imaging results returned.  CT head was unremarkable for any acute pathology.  CT abdomen and pelvis shows no SBO and no other acute pathology besides possible cystitis however UA is contaminated and likely not infectious, culture has been sent.  U tox shows that patient has positive THC but otherwise salicylates acetaminophen and alcohol are negative and patient does not have COVID or the flu.  Chest x-ray was read as clear and her labs including thyroid and hCG are otherwise normal.  Patient is now alert and awake.  States that she is back to baseline and  corroborates this.  Patient is mentating normally.  Patient is ANO x3 and able to answer questions adequately and knows what happened tonight.  She is unsure why it happened but she states that since January when she had respiratory depression and family performed CPR she has been very emotional and having memory loss and deficits and been very anxious.  Has never seen a psychiatrist for this but does get medications for anxiety and depression including trazodone, Ambien, Xanax.  Denies any ingestions tonight but reports that she was given a marijuana edible 3 days ago but has not had any THC since and did not take any other drugs prior to arrival.  She denies any current complaints or abdominal pain and states that she ate a peanut butter and jelly sandwich and drink water while in ED without any issues.  At this time explained to patient and family regarding discharge home to follow-up with PMD closely and return to the ED for any abnormal conditions or again altered mental status.  Will provide all labs and imaging results to patient and family to follow-up with PMD.  Explained that if any conditions deteriorate or worsen or return to call [phone] to return to the emergency department.

## 2022-12-15 NOTE — ED PROVIDER NOTE - PHYSICAL EXAMINATION
FEMALE CHAPERONE: BELLE PRITCHARD  Patient is appearing altered and complaining of snakes in the room and thinks that hospital staff is trying to kill her or hurt her.    Appears baby like in her mentation.    Eyes pupils are equal round reactive, not pinpoint.    Skin is not hot to touch otherwise dry and appears intact without any signs of trauma.    Heart is regular rate and rhythm without any murmurs rubs or gallops,  lungs are clear to auscultation bilaterally,   abdomen is soft and nontender diffusely including left upper quadrant and left flank.    No signs of trauma.  No stigmata of cirrhosis.  No pitting edema BLE.  Rectal exam without stool in vault, no gross blood

## 2022-12-15 NOTE — ED PROVIDER NOTE - NSICDXPASTMEDICALHX_GEN_ALL_CORE_FT
PAST MEDICAL HISTORY:  Asthma     CHF (congestive heart failure) Dx 2014    Migraines     Obstructive sleep apnea on CPAP

## 2022-12-15 NOTE — ED ADULT NURSE NOTE - OBJECTIVE STATEMENT
Pt received in room 10, alert & awake, A&OX1, Pt complaint of L sided abd "knot". Pt brought in by  for poor PO intake, not eating or drinking since Saturday. Pt in intake became delusional, not per baseline per , stating certain employees were out to kill her, snakes are everywhere. Pt states her knot is hurting. IV 18G, R AC, labs collected & sent, meds given, will continue to monitor

## 2022-12-15 NOTE — ED ADULT NURSE NOTE - CHIEF COMPLAINT QUOTE
abdominal pain and constipation since Saturday, not eating as she feels something is bothering her throat. As per  pt is very anxious and worried about everything and her grand children, c/o numbness and tingling to her hands and legs for a week.  Denies fever or chest pain or SOB. Gastric bypass in 2007, CHF, Depression, Anxiety.

## 2022-12-16 ENCOUNTER — INPATIENT (INPATIENT)
Facility: HOSPITAL | Age: 52
LOS: 18 days | Discharge: HOME | End: 2023-01-04
Attending: PSYCHIATRY & NEUROLOGY | Admitting: PSYCHIATRY & NEUROLOGY
Payer: MEDICARE

## 2022-12-16 VITALS
TEMPERATURE: 98 F | RESPIRATION RATE: 16 BRPM | SYSTOLIC BLOOD PRESSURE: 121 MMHG | HEART RATE: 84 BPM | DIASTOLIC BLOOD PRESSURE: 70 MMHG | OXYGEN SATURATION: 97 %

## 2022-12-16 DIAGNOSIS — R41.82 ALTERED MENTAL STATUS, UNSPECIFIED: ICD-10-CM

## 2022-12-16 LAB
ALBUMIN SERPL ELPH-MCNC: 3.8 G/DL — SIGNIFICANT CHANGE UP (ref 3.3–5)
ALP SERPL-CCNC: 73 U/L — SIGNIFICANT CHANGE UP (ref 40–120)
ALT FLD-CCNC: 14 U/L — SIGNIFICANT CHANGE UP (ref 4–33)
ANION GAP SERPL CALC-SCNC: 10 MMOL/L — SIGNIFICANT CHANGE UP (ref 7–14)
APAP SERPL-MCNC: <10 UG/ML — LOW (ref 15–25)
AST SERPL-CCNC: 18 U/L — SIGNIFICANT CHANGE UP (ref 4–32)
BASOPHILS # BLD AUTO: 0.03 K/UL — SIGNIFICANT CHANGE UP (ref 0–0.2)
BASOPHILS NFR BLD AUTO: 0.4 % — SIGNIFICANT CHANGE UP (ref 0–2)
BILIRUB SERPL-MCNC: 0.2 MG/DL — SIGNIFICANT CHANGE UP (ref 0.2–1.2)
BUN SERPL-MCNC: 12 MG/DL — SIGNIFICANT CHANGE UP (ref 7–23)
CALCIUM SERPL-MCNC: 9 MG/DL — SIGNIFICANT CHANGE UP (ref 8.4–10.5)
CHLORIDE SERPL-SCNC: 104 MMOL/L — SIGNIFICANT CHANGE UP (ref 98–107)
CO2 SERPL-SCNC: 23 MMOL/L — SIGNIFICANT CHANGE UP (ref 22–31)
CREAT SERPL-MCNC: 0.67 MG/DL — SIGNIFICANT CHANGE UP (ref 0.5–1.3)
EGFR: 106 ML/MIN/1.73M2 — SIGNIFICANT CHANGE UP
EOSINOPHIL # BLD AUTO: 0.05 K/UL — SIGNIFICANT CHANGE UP (ref 0–0.5)
EOSINOPHIL NFR BLD AUTO: 0.6 % — SIGNIFICANT CHANGE UP (ref 0–6)
ETHANOL SERPL-MCNC: <10 MG/DL — SIGNIFICANT CHANGE UP
GLUCOSE SERPL-MCNC: 103 MG/DL — HIGH (ref 70–99)
HCG SERPL-ACNC: <5 MIU/ML — SIGNIFICANT CHANGE UP
HCT VFR BLD CALC: 29.7 % — LOW (ref 34.5–45)
HGB BLD-MCNC: 8.7 G/DL — LOW (ref 11.5–15.5)
IANC: 4.56 K/UL — SIGNIFICANT CHANGE UP (ref 1.8–7.4)
IMM GRANULOCYTES NFR BLD AUTO: 0.3 % — SIGNIFICANT CHANGE UP (ref 0–0.9)
LYMPHOCYTES # BLD AUTO: 2.58 K/UL — SIGNIFICANT CHANGE UP (ref 1–3.3)
LYMPHOCYTES # BLD AUTO: 32.7 % — SIGNIFICANT CHANGE UP (ref 13–44)
MCHC RBC-ENTMCNC: 21.6 PG — LOW (ref 27–34)
MCHC RBC-ENTMCNC: 29.3 GM/DL — LOW (ref 32–36)
MCV RBC AUTO: 73.9 FL — LOW (ref 80–100)
MONOCYTES # BLD AUTO: 0.64 K/UL — SIGNIFICANT CHANGE UP (ref 0–0.9)
MONOCYTES NFR BLD AUTO: 8.1 % — SIGNIFICANT CHANGE UP (ref 2–14)
NEUTROPHILS # BLD AUTO: 4.56 K/UL — SIGNIFICANT CHANGE UP (ref 1.8–7.4)
NEUTROPHILS NFR BLD AUTO: 57.9 % — SIGNIFICANT CHANGE UP (ref 43–77)
NRBC # BLD: 0 /100 WBCS — SIGNIFICANT CHANGE UP (ref 0–0)
NRBC # FLD: 0 K/UL — SIGNIFICANT CHANGE UP (ref 0–0)
PLATELET # BLD AUTO: 387 K/UL — SIGNIFICANT CHANGE UP (ref 150–400)
POTASSIUM SERPL-MCNC: 3.8 MMOL/L — SIGNIFICANT CHANGE UP (ref 3.5–5.3)
POTASSIUM SERPL-SCNC: 3.8 MMOL/L — SIGNIFICANT CHANGE UP (ref 3.5–5.3)
PROT SERPL-MCNC: 7.4 G/DL — SIGNIFICANT CHANGE UP (ref 6–8.3)
RBC # BLD: 4.02 M/UL — SIGNIFICANT CHANGE UP (ref 3.8–5.2)
RBC # FLD: 16 % — HIGH (ref 10.3–14.5)
SALICYLATES SERPL-MCNC: <0.3 MG/DL — LOW (ref 15–30)
SODIUM SERPL-SCNC: 137 MMOL/L — SIGNIFICANT CHANGE UP (ref 135–145)
TOXICOLOGY SCREEN, DRUGS OF ABUSE, SERUM RESULT: SIGNIFICANT CHANGE UP
TSH SERPL-MCNC: 0.67 UIU/ML — SIGNIFICANT CHANGE UP (ref 0.27–4.2)
WBC # BLD: 7.88 K/UL — SIGNIFICANT CHANGE UP (ref 3.8–10.5)
WBC # FLD AUTO: 7.88 K/UL — SIGNIFICANT CHANGE UP (ref 3.8–10.5)

## 2022-12-16 PROCEDURE — 99285 EMERGENCY DEPT VISIT HI MDM: CPT

## 2022-12-16 PROCEDURE — 93010 ELECTROCARDIOGRAM REPORT: CPT

## 2022-12-16 RX ORDER — HALOPERIDOL DECANOATE 100 MG/ML
5 INJECTION INTRAMUSCULAR ONCE
Refills: 0 | Status: COMPLETED | OUTPATIENT
Start: 2022-12-16 | End: 2022-12-16

## 2022-12-16 NOTE — ED BEHAVIORAL HEALTH ASSESSMENT NOTE - CURRENT MEDICATION
Albuterol PRN, Singulair 10mg daily, Carvedilol 6.25 BID    CPAP Albuterol PRN, Singulair 10mg daily, Carvedilol 6.25 BID    CPAP    Patient last got prescription 11/17/23-Reference #: [id number]  Ambien 10mg #30  Xanax 2mg #30

## 2022-12-16 NOTE — ED BEHAVIORAL HEALTH NOTE - BEHAVIORAL HEALTH NOTE
Called  Abelardo Medellin ([phone])-    Patient stopped eating 12/11 because she was worried about her mother who had open heart surgery last week. 12/12 & 12/13 she didn't eat and then he brought her to the hospital 12/14- He stated patient 12/14  kept stating she was seeing snakes and was worried about grandson getting on the school bus which is what led him to bring her to the hospital. He tried to encourage patient to drink but she said she couldn't because her throat was so dry.  He reports he was in the ED yesterday she received IV fluids because she was having abdominal pain. She kept complaining of numbness in her fingers. She was very dehydrated. She was given 1 IV and he believes she should have more. He reports after the ED visit she came home and she ate a pizza and did various other things. He said she was acting completely normal. Then today they were at [facility] and she started seeming worried and anxious. She started saying that her daughter Sivan was dead and there were snakes. She started saying things were crawling on her. He reports prior to 12/14 she was "great." He stated she typically keeps things to herself.     In the past patient accidently took an OD combining ambien and vicodin and "it created a problem." She was given naloxone. Since then patient suffered from memory loss but everything else has been fine. She is refusing pain medication due to this interaction. She has chronic pain because she has screws in her leg after her right ankle broke.     Patient has a history of inpatient admission at [facility] after OD 2 years ago. She has 1 past suicide attempt 2 years ago which led to her inpatient admission. She has no history of aggression, violence or legal issues She has no history of drug or alcohol. PMH- migraines, asthma & chronic pain s/p right ankle surgery.     Patient previously took Trazodone but stopped it Saturday but stopped because she told the doctor that she was feeling well. She has never had psychotic symptoms. At baseline she is AOx4. Called  Abelardo Medellin ([phone])-    Patient stopped eating 12/11 because she was worried about her mother who had open heart surgery last week. 12/12 & 12/13 she didn't eat and then he brought her to the hospital 12/14- He stated patient 12/14  kept stating she was seeing snakes and was worried about grandson getting on the school bus which is what led him to bring her to the hospital. He tried to encourage patient to drink but she said she couldn't because her throat was so dry.  He reports he was in the ED yesterday she received IV fluids because she was having abdominal pain. She kept complaining of numbness in her fingers. She was very dehydrated. She was given 1 IV and he believes she should have more. He reports after the ED visit she came home and she ate a pizza and did various other things. He said she was acting completely normal. Then today they were at [facility] and she started seeming worried and anxious. She started saying that her daughter Sivan was dead and there were snakes. She started saying things were crawling on her. He reports prior to 12/14 she was "great." He stated she typically keeps things to herself.     In the past patient accidently took an OD combining ambien and vicodin and "it created a problem." She was given naloxone. Since then patient suffered from memory loss but everything else has been fine. She is refusing pain medication due to this interaction. She has chronic pain because she has screws in her leg after her right ankle broke.     Patient has a history of inpatient admission at [facility] after OD 2 years ago. She has 1 past suicide attempt 2 years ago which led to her inpatient admission. She has no history of aggression, violence or legal issues She has no history of drug or alcohol. PMH- migraines, asthma & chronic pain s/p right ankle surgery.     Patient previously took Trazodone but stopped it Saturday but stopped because she told the doctor that she was feeling well. She has never had psychotic symptoms. At baseline she is AOx4.    Patient has a psychiatrist in [place] but he does not know his name. Called  Abelardo Medellin ([phone])-    Patient stopped eating 12/11 because she was worried about her mother who had open heart surgery last week. 12/12 & 12/13 she didn't eat and then he brought her to the hospital 12/14- He stated patient 12/14  kept stating she was seeing snakes and was worried about grandson getting on the school bus which is what led him to bring her to the hospital. He tried to encourage patient to drink but she said she couldn't because her throat was so dry.  He reports he was in the ED yesterday she received IV fluids because she was having abdominal pain. She kept complaining of numbness in her fingers. She was very dehydrated. She was given 1 IV and he believes she should have more. He reports after the ED visit she came home and she ate a pizza and did various other things. He said she was acting completely normal and like herself. Then today they were at [facility] and after she was fine all day she suddenly was acting strange again seeming worried and anxious. She started saying that her daughter Sivan was dead and there were snakes. She started saying things were crawling on her. He reports their daughter is fine. He reports prior to 12/14 she was "great." He stated she typically keeps things to herself. Patient previously took Trazodone but stopped it Saturday but stopped because she told the doctor that she was feeling well. She has never had psychotic symptoms. At baseline she is AOx4.    In the past patient accidently took an OD combining ambien and vicodin and "it created a problem." She was given naloxone. Since then patient suffered from memory loss but everything else has been fine. She is refusing pain medication due to this interaction. She has chronic pain because she has screws in her leg after her right ankle broke.     Patient has a history of inpatient admission at [facility] after OD 2 years ago. She has 1 past suicide attempt 2 years ago which led to her inpatient admission. She has no history of aggression, violence or legal issues She has no history of drug or alcohol. PMH- migraines, asthma & chronic pain s/p right ankle surgery.     Patient has a psychiatrist in [place] but he does not know his name. Called  Abelardo Medellin ([phone])-    Patient stopped eating 12/11 he thinks because she was worried about her mother who had open heart surgery last week. 12/12 & 12/13 she didn't eat and then he brought her to the hospital 12/14- He stated patient 12/14  kept stating she was seeing snakes and was worried about grandson getting on the school bus which is what led him to bring her to the hospital. He tried to encourage patient to drink but she said she couldn't because her throat was so dry.  He reports he was in the ED yesterday she received IV fluids because she was having abdominal pain. She kept complaining of numbness in her fingers. She was very dehydrated. She was given 1 IV and he believes she should have more. He reports after the ED visit she came home and she ate a pizza and did various other things. He said she was acting completely normal and like herself. Then today they were at [facility] and after she was fine all day she suddenly was acting strange again seeming worried and anxious. She started saying that her daughter Sivan was dead and there were snakes. She started saying things were crawling on her. He reports their daughter is fine. He reports prior to 12/14 she was "great." He stated she typically keeps things to herself. Patient previously took Trazodone but stopped it Saturday but stopped because she told the doctor that she was feeling well. She has never had psychotic symptoms. At baseline she is AOx4.    In the past patient accidently took an OD combining ambien and vicodin and "it created a problem." She was given naloxone. Since then patient suffered from memory loss but everything else has been fine. She is refusing pain medication due to this interaction. She has chronic pain because she has screws in her leg after her right ankle broke.     Patient has a history of inpatient admission at [facility] after OD 2 years ago. She has 1 past suicide attempt 2 years ago which led to her inpatient admission. She has no history of aggression, violence or legal issues She has no history of drug or alcohol. PMH- migraines, asthma & chronic pain s/p right ankle surgery.     Patient has a psychiatrist in [place] but he does not know his name. Called  Abelardo Medellin ([phone])-    Patient stopped eating 12/11 he thinks because she was worried about her mother who had open heart surgery last week. 12/12 & 12/13 she didn't eat and then he brought her to the hospital 12/14- He stated patient 12/14  kept stating she was seeing snakes and was worried about grandson getting on the school bus which is what led him to bring her to the hospital. He tried to encourage patient to drink but she said she couldn't because her throat was so dry.  He reports he was in the ED late 12/14 to early AM 12/15 she received IV fluids and was having abdominal pain. She kept complaining of numbness in her fingers. She was very dehydrated. She was given 1 IV and he believes she should have more. He reports after the ED visit she came home and she ate a pizza and did various other things. He said she was acting completely normal and like herself. Then  today they were at [facility] and she suddenly was acting strange again seeming worried and anxious. She started saying that her daughter Sivan was dead and there were snakes. She started saying things were crawling on her. He reports their daughter is fine. He reports prior to 12/14 she was "great." He stated she typically keeps things to herself. Patient previously took Trazodone but stopped it because she told the doctor it made her feel unwell. She has never had psychotic symptoms. At baseline she is AOx4.     In the past patient accidently took an OD combining ambien and vicodin and "it created a problem." She was given naloxone. Since then patient suffered from memory loss but everything else has been fine. She is refusing pain medication due to this interaction. She has chronic pain because she has screws in her leg after her right ankle broke.     Patient has a history of inpatient admission at [facility] after OD 2 years ago. She has 1 past suicide attempt 2 years ago which led to her inpatient admission. She has no history of aggression, violence or legal issues She has no history of drug or alcohol. PMH- migraines, asthma & chronic pain s/p right ankle surgery.     Patient has a psychiatrist in [place] but he does not know his name.

## 2022-12-16 NOTE — ED BEHAVIORAL HEALTH ASSESSMENT NOTE - OTHER PAST PSYCHIATRIC HISTORY (INCLUDE DETAILS REGARDING ONSET, COURSE OF ILLNESS, INPATIENT/OUTPATIENT TREATMENT)
PPH MDD & Anxiety. Patient has a history of inpatient admission at [facility] after OD 2 years ago. She has 1 past suicide attempt 2 years ago which led to her inpatient admission.Outpatient with MD in [place]. PPH MDD & Anxiety. Patient has a history of inpatient admission at [facility] after OD 2 years ago. She has 1 past suicide attempt 2 years ago which led to her inpatient admission. Outpatient with MD in [place] prescriber - Víctor Styles PPH MDD with psychosis & Anxiety. Patient has a history of inpatient admission at [facility] after OD 2 years ago. She has 1 past suicide attempt 2 years ago which led to her inpatient admission. Outpatient with MD in [place] prescriber - Víctor Styles

## 2022-12-16 NOTE — ED BEHAVIORAL HEALTH ASSESSMENT NOTE - ATTENDING COMMENTS
53 YO F domiciled with , has history of MDD with psychosis, 1 prior psychiatric hospitalization following overdose, presenting with acute behavioral changes in context of 5 days of depressed mood.   Was observed to be screaming, unable to answer basic orientation questions and was endorsing tactile hallucinations, however less likely to indicate delirium given unremarkable acute medical workup including non-contrast head CT, more suggestive of acute psychotic state.  However may warrant more head imaging including head CT with contrast vs head MRI if symptoms persist.  Warrants involuntary psychiatric hospitalization for symptom stabilization.

## 2022-12-16 NOTE — ED BEHAVIORAL HEALTH ASSESSMENT NOTE - SUMMARY
Patient is a 52 year old female unemployed lives with . PPH MDD & Anxiety. Patient has a history of inpatient admission at [facility] after OD 2 years ago. She has 1 past suicide attempt 2 years ago which led to her inpatient admission. She has no history of aggression, violence or legal issues She has no history of drug or alcohol. PMH- migraines, asthma, MARGARETH, s/p gastic bypass & chronic pain s/p right ankle surgery. BIBA  for bizarre behavior. Patient is a 52 year old female unemployed lives with . PPH MDD & Anxiety. Patient has a history of inpatient admission at [facility] after OD 2 years ago. She has 1 past suicide attempt 2 years ago which led to her inpatient admission. She has no history of aggression, violence or legal issues She has no history of drug or alcohol. PMH- CHF, migraines, asthma, MARGARETH, s/p gastic bypass & chronic pain s/p right ankle surgery. BIBA  for bizarre behavior.    Patient presents to the ED for bizarre behavior. Patient presents with delusional and psychotic with concerning AMS. Based on timeline of symptoms and abrupt improvement 12/15/22 after IV fluids with cognitive improvement followed by over 24 hours later of acute onset of psychotic symptoms with tactile hallucinations and AMS there is concern for possible medical etiology vs. primary psychiatric disorder. Patient at this time appears distraught and recommended EM to provider medication ativan/haldol. Will await blood work, urine and medical clearance and re-evaluate patient. Patient is a 52 year old female unemployed lives with . PPH MDD & Anxiety. Patient has a history of inpatient admission at [facility] after OD 2 years ago. She has 1 past suicide attempt 2 years ago which led to her inpatient admission. She has no history of aggression, violence or legal issues She has no history of drug or alcohol. PMH- CHF, migraines, asthma, MARGARETH, s/p gastic bypass & chronic pain s/p right ankle surgery. BIBA  for bizarre behavior.    Patient presents to the ED for bizarre behavior. Patient presents with delusional and psychotic symptoms but also appears confused and has concerning AMS. Based on timeline of symptoms and abrupt improvement 12/15/22 after IV fluids/ativan with cognitive improvement followed by over 24 hours later decompensation with tactile hallucinations and AMS there is concern for possible medical etiology vs. substance induced psychotic (THC edible?) vs. primary psychiatric disorder. Patient at this time appears distraught and recommended EM to provider medication ativan/haldol. Will await blood work, urine and medical clearance and re-evaluate patient due to her limited participation in interview. Patient is a 52 year old female unemployed lives with . PPH MDD with psychosis per psyckes & Anxiety. Patient has a history of inpatient admission at [facility] after OD 2 years ago. She has 1 past suicide attempt 2 years ago which led to her inpatient admission. She has no history of aggression, violence or legal issues She has no history of drug or alcohol. PMH- CHF, migraines, asthma, MARGARETH, s/p gastic bypass & chronic pain s/p right ankle surgery. BIBA  for bizarre behavior.    Patient presents to the ED for bizarre behavior. Patient presents with delusional and psychotic symptoms but also appears confused and has concerning AMS. Based on timeline of symptoms and abrupt improvement 12/15/22 after IV fluids/ativan with cognitive improvement followed by over 24 hours later decompensation with tactile hallucinations and AMS there is concern for possible medical etiology vs. substance induced psychotic (THC edible?) vs. primary psychiatric disorder. Patient at this time appears distraught and recommended EM to provider medication ativan/haldol. Will await blood work, urine and medical clearance.    Patient medically cleared. Able to gather further collateral. Patient has history of MDD with psychosis. Likely similar presentation based on recent stressor and current symptoms. Pt is at elevated risk for inadvertent injury to self/others due to exacerbation and intensity of psychosis and will therefore require admission to inpatient psychiatry at this time.

## 2022-12-16 NOTE — ED ADULT TRIAGE NOTE - CHIEF COMPLAINT QUOTE
Pt arrives accompanied by , says has "not been eating or drinking X last couple of days". Was here the other day, had IV fluids and was having bizarre behaviors. Pt with bizarre behavior in triage at this time. Screaming and not answering questions. Refusing temperature check.  says "I think she just needs a IV". Phx: depression, migraines, CHF.

## 2022-12-16 NOTE — ED BEHAVIORAL HEALTH ASSESSMENT NOTE - NS ED BHA PLAN REASON IP CARE OTHER2 FT
Pt is at elevated risk for inadvertent injury to self/others due to exacerbation and intensity of psychosis and will therefore require admission to inpatient psychiatry at this time.

## 2022-12-16 NOTE — ED BEHAVIORAL HEALTH ASSESSMENT NOTE - DIFFERENTIAL
psychosis/AMS due to C  MDD with psychosis psychosis/AMS due to C  MDD with psychosis  substance induced psychosis MDD with psychosis

## 2022-12-16 NOTE — ED BEHAVIORAL HEALTH ASSESSMENT NOTE - RISK ASSESSMENT
modifiable risk factors- AMS, psychosis, labile    unmodifiable risk factors- history of suicide attempt, history of inpatient admission, chronic medical issues modifiable risk factors- unpredictable , psychosis, labile    unmodifiable risk factors- history of suicide attempt, history of inpatient admission, chronic medical issues

## 2022-12-16 NOTE — ED BEHAVIORAL HEALTH ASSESSMENT NOTE - HPI (INCLUDE ILLNESS QUALITY, SEVERITY, DURATION, TIMING, CONTEXT, MODIFYING FACTORS, ASSOCIATED SIGNS AND SYMPTOMS)
Patient is a 52 year old female unemployed lives with . PPH MDD & Anxiety. Patient has a history of inpatient admission at [facility] after OD 2 years ago. She has 1 past suicide attempt 2 years ago which led to her inpatient admission. She has no history of aggression, violence or legal issues She has no history of drug or alcohol. PMH- migraines, asthma, MARGARETH, s/p gastic bypass & chronic pain s/p right ankle surgery. BIBA  for bizarre behavior.    Patient noted to be crying screaming "Sivan! Save Sivan!" She asked evaluator if they were going to take her to Sivan. She stated "she , he murdered her." "Silus, Silus, come out of the closet" and was pointing at the door. Patient then screamed and said "snakes, snakes, they're all over the floor." She then pointed to her arm and stated bugs were crawling all over her. She was AOx1- she did not know where she was, she stated she was in [place] but wasn't sure what kind of building this was. She could not recall her year of birth. She was able to name objects but was unsure regarding the state, country, who the president was, season or date. She stated she doesn't know who she lives with. Patient struggled to participate in interview due to level of disorganization.    She denied suicidal ideation/HI. She then screamed about the snakes and the bugs crawling all over her.    See  note for collateral information. Patient is a 52 year old female unemployed lives with . PPH MDD & Anxiety. Patient has a history of inpatient admission at [facility] after OD 2 years ago. She has 1 past suicide attempt 2 years ago which led to her inpatient admission. She has no history of aggression, violence or legal issues She has no history of drug or alcohol. PMH- migraines, asthma, chf, MARGARETH, s/p gastic bypass & chronic pain s/p right ankle surgery. BIBA  for bizarre behavior.    Patient noted to be crying screaming "Sivan! Save Sivan!" She asked evaluator if they were going to take her to Sivan. She stated "she , he murdered her." "Silus, Silus, come out of the closet" and was pointing at the door. Patient then screamed and said "snakes, snakes, they're all over the floor." She then pointed to her arm and stated bugs were crawling all over her. She was AOx1- she did not know where she was, she stated she was in [place] but wasn't sure what kind of building this was. She could not recall her year of birth. She was able to name objects but was unsure regarding the state, country, who the president was, season or date. She stated she doesn't know who she lives with. Patient struggled to participate in interview due to level of disorganization.    She denied suicidal ideation/HI. She then screamed about the snakes and the bugs crawling all over her.    See  note for collateral information. Patient is a 52 year old female unemployed lives with . PPH MDD & Anxiety. Patient has a history of inpatient admission at [facility] after OD 2 years ago. She has 1 past suicide attempt 2 years ago which led to her inpatient admission. She has no history of aggression, violence or legal issues She has no history of drug or alcohol. PMH- migraines, asthma, chf, MARGARETH, s/p gastic bypass & chronic pain s/p right ankle surgery. BIBA  for bizarre behavior.    Patient noted to be crying screaming "Sivan! Save Sivan!" She asked evaluator if they were going to take her to Sivan. She stated "she , he murdered her." "Silus, Silus, come out of the closet" and was pointing at the door. Patient then screamed and said "snakes, snakes, they're all over the floor." She then pointed to her arm and stated bugs were crawling all over her. She was AOx1- she did not know where she was, she stated she was in [place] but wasn't sure what kind of building this was. She could not recall her year of birth. She was able to name objects but was unsure regarding the state, country, who the president was, season or date. She stated she doesn't know who she lives with. Patient struggled to participate in interview due to level of disorganization. She then screamed about the snakes and the bugs crawling all over her.    See  note for collateral information. Patient is a 52 year old female unemployed lives with . PPH MDD with psychosis per Psyckes & Anxiety. Patient has a history of inpatient admission at [facility] after OD 2 years ago. She has 1 past suicide attempt 2 years ago which led to her inpatient admission. She has no history of aggression, violence or legal issues She has no history of drug or alcohol. PMH- migraines, asthma, chf, MARGARETH, s/p gastic bypass & chronic pain s/p right ankle surgery. BIBA  for bizarre behavior.    Patient noted to be crying screaming "Sivan! Save Sivan!" She asked evaluator if they were going to take her to S[place]. She stated "she , he murdered her." "Silus, Silus, come out of the closet" and was pointing at the door. Patient then screamed and said "snakes, snakes, they're all over the floor." She then pointed to her arm and stated bugs were crawling all over her. She was AOx1- she did not know where she was, she stated she was in [place] but wasn't sure what kind of building this was. She could not recall her year of birth. She was able to name objects but was unsure regarding the state, country, who the president was, season or date. She stated she doesn't know who she lives with. Patient struggled to participate in interview due to level of disorganization. She then screamed about the snakes and the bugs crawling all over her.    See  note for collateral information.

## 2022-12-16 NOTE — ED PROVIDER NOTE - DURATION
Problem: Nutritional:  Goal: Achieve adequate nutritional intake  Description: Diet advanced past clear liquid. Patient will consume >50% of meals  Outcome: Not Met     
The patient is Stable - Low risk of patient condition declining or worsening         Progress made toward(s) clinical / shift goals:          Problem: Pain - Standard  Goal: Alleviation of pain or a reduction in pain to the patient’s comfort goal  Outcome: Progressing     Problem: Knowledge Deficit - Standard  Goal: Patient and family/care givers will demonstrate understanding of plan of care, disease process/condition, diagnostic tests and medications  Outcome: Progressing     
The patient is Stable - Low risk of patient condition declining or worsening        Patient is not progressing towards the following goals:      Problem: Pain - Standard  Goal: Alleviation of pain or a reduction in pain to the patient’s comfort goal  Outcome: Not Progressing     Problem: Knowledge Deficit - Standard  Goal: Patient and family/care givers will demonstrate understanding of plan of care, disease process/condition, diagnostic tests and medications  Outcome: Not Progressing     
day(s)

## 2022-12-16 NOTE — ED BEHAVIORAL HEALTH ASSESSMENT NOTE - NS ED BHA PLAN PSYCHIATRIC ISSUES2 FT
Abilify 5mgQHS, Haldol 5mg PO/IM Ativan 2mg PO/IM PRN, Benadryl 50mg PO/IM PRN Abilify 5mgQHS, Haldol 5mg PO/IM, Hydroxyzine 50mg PRN PO/Ativan 2mg IM PRN, Benadryl 50mg PO/IM PRN

## 2022-12-16 NOTE — ED PROVIDER NOTE - OBJECTIVE STATEMENT
51 y/o M hx  MDD, MARGARETH, Asthma currently on Trazodone BIB   secondary to  paranoid and bizarre behaviors x several days.  Patient appeared internally preoccupied, stating someone stole her child at [employer]  and that she is spitting snakes.  states that patient has refused to eat for several  days.   Patient was seen in ER 12/15 /22 , hydrated,  had full medical workup then discharged home.  No evidence of physical injuries, broken skin or deformities.

## 2022-12-16 NOTE — ED BEHAVIORAL HEALTH ASSESSMENT NOTE - NSSUICPROTFACT_PSY_ALL_CORE
Identifies reasons for living/Supportive social network of family or friends Supportive social network of family or friends

## 2022-12-16 NOTE — ED ADULT NURSE NOTE - OBJECTIVE STATEMENT
Pt presents to , disoriented, ambulatory at baseline, pmhx Depression, CHF coming to ED for hallucinations and bizarre behavior. As per , pt was seen in the hospital 4 days ago and received IV fluids, continues to state "I think she needs fluids". Upon arrival, pt is very disorganized, screaming "GAYLE, I need gayle!". Pt continues to state that she feels spiders and snakes on her skin. Unable to obtain full hx. Safety measures maintained, breathing non-labored. Awaiting further plan of care

## 2022-12-16 NOTE — ED PROVIDER NOTE - CLINICAL SUMMARY MEDICAL DECISION MAKING FREE TEXT BOX
53 y/o M hx  MDD, MARGARETH, Asthma   Labs, Urine Tox/UA, EKG.  CT head WNL - 12/15/22  Psychiatric consultation called.

## 2022-12-16 NOTE — ED BEHAVIORAL HEALTH ASSESSMENT NOTE - DESCRIPTION
52 year old female, lives with  During course of ED visit patient was calm and cooperative. Patient was not aggressive or violent and did not require PRN medications.    ICU Vital Signs Last 24 Hrs  T(C): 36.9 (16 Dec 2022 20:13), Max: 36.9 (16 Dec 2022 20:13)  T(F): 98.4 (16 Dec 2022 20:13), Max: 98.4 (16 Dec 2022 20:13)  HR: 84 (16 Dec 2022 20:13) (84 - 84)  BP: 121/70 (16 Dec 2022 20:13) (121/70 - 121/70)  BP(mean): --  ABP: --  ABP(mean): --  RR: 16 (16 Dec 2022 20:13) (16 - 16)  SpO2: 97% (16 Dec 2022 20:13) (97% - 97%)    O2 Parameters below as of 16 Dec 2022 20:13  Patient On (Oxygen Delivery Method): room air migraines, asthma, MARGARETH, s/p gastic bypass & chronic pain s/p right ankle surgery. migraines, chf, asthma, MARGARETH, s/p gastic bypass & chronic pain s/p right ankle surgery. During course of ED visit patient was agitated, screaming appeared psychotic- given Haldol 5mg IM at 23:55    ICU Vital Signs Last 24 Hrs  T(C): 36.9 (16 Dec 2022 20:13), Max: 36.9 (16 Dec 2022 20:13)  T(F): 98.4 (16 Dec 2022 20:13), Max: 98.4 (16 Dec 2022 20:13)  HR: 84 (16 Dec 2022 20:13) (84 - 84)  BP: 121/70 (16 Dec 2022 20:13) (121/70 - 121/70)  BP(mean): --  ABP: --  ABP(mean): --  RR: 16 (16 Dec 2022 20:13) (16 - 16)  SpO2: 97% (16 Dec 2022 20:13) (97% - 97%)    O2 Parameters below as of 16 Dec 2022 20:13  Patient On (Oxygen Delivery Method): room air

## 2022-12-16 NOTE — ED PROVIDER NOTE - PROGRESS NOTE DETAILS
Received signout on this patient from RAJ CORTES:   52-year-old female with past medical history and past psychiatric history as documented above, brought in by  for bizarre behavior X few days, found to be disorganized and psychotic.  Medically cleared, and already evaluated by ED psychiatrist, boarding awaiting admission to [facility] in the morning. Transferred to main ED for home nighttime CPAP requirement.  On my assessment, patient denies shortness of breath and has normal work of breathing, will place on CPAP for sleep.  -Dr. Mayfield.

## 2022-12-16 NOTE — ED PROVIDER NOTE - HISTORY ATTESTATION, MLM
I have reviewed and confirmed nurses' notes... [Alert] : alert [No Acute Distress] : no acute distress [Normocephalic] : normocephalic [EOMI Bilateral] : EOMI bilateral [Clear tympanic membranes with bony landmarks and light reflex present bilaterally] : clear tympanic membranes with bony landmarks and light reflex present bilaterally  [Pink Nasal Mucosa] : pink nasal mucosa [Nonerythematous Oropharynx] : nonerythematous oropharynx [Supple, full passive range of motion] : supple, full passive range of motion [No Palpable Masses] : no palpable masses [Clear to Auscultation Bilaterally] : clear to auscultation bilaterally [Regular Rate and Rhythm] : regular rate and rhythm [Normal S1, S2 audible] : normal S1, S2 audible [No Murmurs] : no murmurs [+2 Femoral Pulses] : +2 femoral pulses [Soft] : soft [NonTender] : non tender [Non Distended] : non distended [Normoactive Bowel Sounds] : normoactive bowel sounds [No Hepatomegaly] : no hepatomegaly [No Splenomegaly] : no splenomegaly [Olayinka: _____] : Olayinka [unfilled] [Circumcised] : circumcised [Bilateral descended testes] : bilateral descended testes [No Testicular Masses] : no testicular masses [No Abnormal Lymph Nodes Palpated] : no abnormal lymph nodes palpated [Normal Muscle Tone] : normal muscle tone [No Gait Asymmetry] : no gait asymmetry [No pain or deformities with palpation of bone, muscles, joints] : no pain or deformities with palpation of bone, muscles, joints [Straight] : straight [No Scoliosis] : no scoliosis [+2 Patella DTR] : +2 patella DTR [Cranial Nerves Grossly Intact] : cranial nerves grossly intact [No Rash or Lesions] : no rash or lesions [de-identified] : s

## 2022-12-16 NOTE — ED ADULT NURSE NOTE - NS TRANSFER PATIENT BELONGINGS
Infectious Diseases Daily Progress Note      Patient's Name: Pallavi Benton   Date of Service: 1/22/2019     Date of admission: 1/17/2019                Hospital Day: 6  Principal Diagnosis:                             Pallavi Benton who is a 70 year old female admitted 1/17/2019  2:05 PM with   J18.1 Pneumonia of right lower lobe due to infectious organism (CMS/[facility])  (primary encounter diagnosis)  D64.9 Anemia, unspecified type  K92.2 Gastrointestinal hemorrhage, unspecified gastrointestinal hemorrhage type  N17.9, N18.9 Acute renal failure superimposed on chronic kidney disease, unspecified CKD stage, unspecified acute renal failure type (CMS/[facility])                       Scheduled Medications     epoetin angie 4,000 Units Once per day on Mon   cefdinir 300 mg Q48H   pantoprazole 40 mg 2 times per day   azithromycin 500 mg Daily   allopurinol 100 mg Daily   ascorbic acid 1,000 mg Daily   atorvastatin 10 mg Daily   B complex-vitamin C-folic acid 1 tablet Daily   busPIRone 15 mg 2 times per day   calcium carbonate 500 mg 2 times per day   cholecalciferol 2,000 Units 2 times per day   furosemide 60 mg BID   gabapentin 300 mg TID   gemfibrozil 600 mg 2 times per day   levothyroxine 175 mcg QAM AC   sertraline 200 mg Daily   sodium bicarbonate 650 mg 2 times per day   ziprasidone 60 mg BID WC   ferrous sulfate 325 mg BID WC   insulin lispro  TID AC   guaiFENesin 1,200 mg 2 times per day   nicotine 1 patch Daily   And     nicotine 1 patch Daily   ziprasidone 80 mg Nightly     Infusions:  • sodium chloride 0.9% infusion     • sodium chloride 0.9% infusion     • dextrose 5 % infusion       Past Medical History, Social History, Medications and Allergies reviewed.   Reviewed Pertinent: Laboratory studies, radiographic studies, medications, and recent progress notes.     Subjective:  Feels better. No new complaints.        Review of Systems: No fever or chills. No nausea, abdominal pain No rashes. No cough or chest  pain. No urinary symptoms.     Objective:    VITAL SIGNS  Temp  Min: 97.9 °F (36.6 °C)  Max: 98.1 °F (36.7 °C)  Temp:  [97.9 °F (36.6 °C)-98.1 °F (36.7 °C)] 97.9 °F (36.6 °C)  Pulse:  [66-72] 66  Resp:  [18] 18  BP: (133-155)/(57-72) 133/72  PHYSICAL EXAMINATION  GENERAL:  Awake, alert,   no acute distress.  HEENT:    Pupils equal.  No scleral icterus.    no thrush.  NECK:  Supple   PULMONARY: Bilateral equal air entry, no use of accessory muscles, lungs are clear bilaterally, no wheezing   CARDIOVASCULAR:  Pulses regular, S1, S4 normal, no murmurs    GASTROINTESTINAL:  Abdomen is soft, nontender, no distention.  Bowel sounds normoactive.     GENITOURINARY:  No muller  RECTAL:  perirectal area reveals a small wound with little erythema, induration but surprisingly not very tender.   no drainage seen.     EXTREMITIES:  No edema .  SKIN:  Intact, no rashes.  MUSCULOSKELETAL:  no joint swelling or synovitis.    LABORATORY DATA:  Recent Labs   Lab 01/22/19  0550 01/21/19  0538 01/20/19  0548  01/18/19  0411 01/17/19  1412 01/17/19  1303   SODIUM 139 140 143   < > 141  --  137   POTASSIUM 4.6 5.1 4.4   < > 4.4  --  4.3   BUN 54* 50* 45*   < > 80*  --  86*   CREATININE 3.84* 3.74* 3.52*   < > 4.72*  --  5.07*   GFRNA 11 12 13   < > 9  --  8   GFRA 13 14 15   < > 10  --  9   GLUCOSE 135* 150* 108*   < > 66  --  129*   CALCIUM 8.7 8.9 8.0*   < > 8.0*  --  8.1*   ALBUMIN  --   --   --   --   --   --  2.7*   AST  --   --   --   --   --   --  17   GPT  --   --   --   --   --   --  27   ALKPT  --   --   --   --   --   --  85   BILIRUBIN  --   --   --   --   --   --  0.2   PCT  --   --   --   --  0.24*  --   --    INR  --   --   --   --   --  1.0  --     < > = values in this interval not displayed.     Recent Labs   Lab 01/22/19  0550 01/21/19  0538 01/20/19  0548 01/19/19  0635   WBC 9.7 8.8 9.2 10.8   HGB 8.7* 8.4* 8.2* 7.7*   HCT 27.6* 26.7* 25.5* 24.1*    191 157 183   BAND  --  1 2 2      Last hemoglobin A1c  06/06/2018 was 5.0.    Urine legionella and strep antigens neg     IMAGING:  XR CHEST AP OR PA - PORTABLE   Final Result by Servando Arriaga MD (01/17 6102)   IMPRESSION:       1. Patchy infiltrate right lower lobe.   2. Normal heart and vascularity. No other infiltrates. No pleural effusion   or pneumothorax.               IMPRESSION:  Community-acquired pneumonia.  Perirectal abscess.  Acute on chronic anemia, suspecting secondary to gastrointestinal blood loss.  Chronic kidney disease, stage IV.  Diabetes mellitus, type 2.     PLAN AND RECOMMENDATIONS:  Continue  PO cefdinir until tomorrow and DC azithromycin            M. Aldair Ramirez MD  Infectious Diseases  [phone] (P)   [phone] (A)  1/22/2019     9:13 AM         Clothing

## 2022-12-17 DIAGNOSIS — F29 UNSPECIFIED PSYCHOSIS NOT DUE TO A SUBSTANCE OR KNOWN PHYSIOLOGICAL CONDITION: ICD-10-CM

## 2022-12-17 DIAGNOSIS — F33.3 MAJOR DEPRESSIVE DISORDER, RECURRENT, SEVERE WITH PSYCHOTIC SYMPTOMS: ICD-10-CM

## 2022-12-17 LAB
B PERT DNA SPEC QL NAA+PROBE: SIGNIFICANT CHANGE UP
B PERT+PARAPERT DNA PNL SPEC NAA+PROBE: SIGNIFICANT CHANGE UP
BORDETELLA PARAPERTUSSIS (RAPRVP): SIGNIFICANT CHANGE UP
C PNEUM DNA SPEC QL NAA+PROBE: SIGNIFICANT CHANGE UP
COVID-19 SPIKE DOMAIN AB INTERP: POSITIVE
COVID-19 SPIKE DOMAIN ANTIBODY RESULT: >250 U/ML — HIGH
FLUAV SUBTYP SPEC NAA+PROBE: SIGNIFICANT CHANGE UP
FLUBV RNA SPEC QL NAA+PROBE: SIGNIFICANT CHANGE UP
HADV DNA SPEC QL NAA+PROBE: SIGNIFICANT CHANGE UP
HCOV 229E RNA SPEC QL NAA+PROBE: SIGNIFICANT CHANGE UP
HCOV HKU1 RNA SPEC QL NAA+PROBE: SIGNIFICANT CHANGE UP
HCOV NL63 RNA SPEC QL NAA+PROBE: SIGNIFICANT CHANGE UP
HCOV OC43 RNA SPEC QL NAA+PROBE: SIGNIFICANT CHANGE UP
HMPV RNA SPEC QL NAA+PROBE: SIGNIFICANT CHANGE UP
HPIV1 RNA SPEC QL NAA+PROBE: SIGNIFICANT CHANGE UP
HPIV2 RNA SPEC QL NAA+PROBE: SIGNIFICANT CHANGE UP
HPIV3 RNA SPEC QL NAA+PROBE: SIGNIFICANT CHANGE UP
HPIV4 RNA SPEC QL NAA+PROBE: SIGNIFICANT CHANGE UP
M PNEUMO DNA SPEC QL NAA+PROBE: SIGNIFICANT CHANGE UP
RAPID RVP RESULT: SIGNIFICANT CHANGE UP
RSV RNA SPEC QL NAA+PROBE: SIGNIFICANT CHANGE UP
RV+EV RNA SPEC QL NAA+PROBE: SIGNIFICANT CHANGE UP
SARS-COV-2 IGG+IGM SERPL QL IA: >250 U/ML — HIGH
SARS-COV-2 IGG+IGM SERPL QL IA: POSITIVE
SARS-COV-2 RNA SPEC QL NAA+PROBE: SIGNIFICANT CHANGE UP

## 2022-12-17 PROCEDURE — 99222 1ST HOSP IP/OBS MODERATE 55: CPT

## 2022-12-17 RX ORDER — DIPHENHYDRAMINE HCL 50 MG
50 CAPSULE ORAL EVERY 6 HOURS
Refills: 0 | Status: DISCONTINUED | OUTPATIENT
Start: 2022-12-17 | End: 2023-01-04

## 2022-12-17 RX ORDER — CARVEDILOL PHOSPHATE 80 MG/1
6.25 CAPSULE, EXTENDED RELEASE ORAL
Refills: 0 | Status: DISCONTINUED | OUTPATIENT
Start: 2022-12-17 | End: 2022-12-17

## 2022-12-17 RX ORDER — INFLUENZA VIRUS VACCINE 15; 15; 15; 15 UG/.5ML; UG/.5ML; UG/.5ML; UG/.5ML
0.5 SUSPENSION INTRAMUSCULAR ONCE
Refills: 0 | Status: DISCONTINUED | OUTPATIENT
Start: 2022-12-17 | End: 2023-01-04

## 2022-12-17 RX ORDER — CARVEDILOL PHOSPHATE 80 MG/1
6.25 CAPSULE, EXTENDED RELEASE ORAL EVERY 12 HOURS
Refills: 0 | Status: DISCONTINUED | OUTPATIENT
Start: 2022-12-17 | End: 2022-12-17

## 2022-12-17 RX ORDER — MONTELUKAST 4 MG/1
10 TABLET, CHEWABLE ORAL AT BEDTIME
Refills: 0 | Status: DISCONTINUED | OUTPATIENT
Start: 2022-12-17 | End: 2022-12-17

## 2022-12-17 RX ORDER — RISPERIDONE 4 MG/1
1 TABLET ORAL AT BEDTIME
Refills: 0 | Status: DISCONTINUED | OUTPATIENT
Start: 2022-12-17 | End: 2022-12-18

## 2022-12-17 RX ORDER — HYDROXYZINE HCL 10 MG
50 TABLET ORAL EVERY 6 HOURS
Refills: 0 | Status: DISCONTINUED | OUTPATIENT
Start: 2022-12-17 | End: 2023-01-04

## 2022-12-17 RX ORDER — MONTELUKAST 4 MG/1
10 TABLET, CHEWABLE ORAL DAILY
Refills: 0 | Status: DISCONTINUED | OUTPATIENT
Start: 2022-12-17 | End: 2022-12-20

## 2022-12-17 RX ORDER — HALOPERIDOL DECANOATE 100 MG/ML
5 INJECTION INTRAMUSCULAR ONCE
Refills: 0 | Status: DISCONTINUED | OUTPATIENT
Start: 2022-12-17 | End: 2023-01-04

## 2022-12-17 RX ORDER — DIPHENHYDRAMINE HCL 50 MG
50 CAPSULE ORAL ONCE
Refills: 0 | Status: DISCONTINUED | OUTPATIENT
Start: 2022-12-17 | End: 2023-01-04

## 2022-12-17 RX ORDER — ALBUTEROL 90 UG/1
2 AEROSOL, METERED ORAL EVERY 6 HOURS
Refills: 0 | Status: DISCONTINUED | OUTPATIENT
Start: 2022-12-17 | End: 2023-01-04

## 2022-12-17 RX ORDER — CARVEDILOL PHOSPHATE 80 MG/1
6.25 CAPSULE, EXTENDED RELEASE ORAL EVERY 12 HOURS
Refills: 0 | Status: DISCONTINUED | OUTPATIENT
Start: 2022-12-17 | End: 2022-12-20

## 2022-12-17 RX ADMIN — RISPERIDONE 1 MILLIGRAM(S): 4 TABLET ORAL at 20:33

## 2022-12-17 RX ADMIN — CARVEDILOL PHOSPHATE 6.25 MILLIGRAM(S): 80 CAPSULE, EXTENDED RELEASE ORAL at 20:33

## 2022-12-17 RX ADMIN — HALOPERIDOL DECANOATE 5 MILLIGRAM(S): 100 INJECTION INTRAMUSCULAR at 00:06

## 2022-12-17 NOTE — ED BEHAVIORAL HEALTH NOTE - BEHAVIORAL HEALTH NOTE
COVID Exposure Screen- Patient    1.	*Have you had a COVID-19 test in the last 90 days?  ( x ) Yes   (  ) No   (  ) Unknown- Reason: _____  IF YES PROCEED TO QUESTION #2. IF NO OR UNKNOWN, PLEASE SKIP TO QUESTION #3.  2.	Date of test(s) and result(s): 12/14/22    3.	*Have you tested positive for COVID-19 antibodies? (  ) Yes   (  x) No   (  ) Unknown- Reason: _____  IF YES PROCEED TO QUESTION #4. IF NO or UNKNOWN, PLEASE SKIP TO QUESTION #5.  4.	Date of positive antibody test: ________    5.	*Have you received 2 doses of the COVID-19 vaccine? ( x ) Yes   (  ) No   (  ) Unknown- Reason: _____   IF YES PROCEED TO QUESTION #6. IF NO or UNKNOWN, PLEASE SKIP TO QUESTION #7.  6.	Date of second dose: 2021    7.	*In the past 10 days, have you been around anyone with a positive COVID-19 test?* (  ) Yes   (  x) No   (  ) Unknown- Reason: ____  IF YES PROCEED TO QUESTION #8. IF NO or UNKNOWN, PLEASE SKIP TO QUESTION #13.  8.	Were you within 6 feet of them for at least 15 minutes? (  ) Yes   (  ) No   (  ) Unknown- Reason: _____  9.	Have you provided care for them? (  ) Yes   (  ) No   (  ) Unknown- Reason: ______  10.	Have you had direct physical contact with them (touched, hugged, or kissed them)? (  ) Yes   (  ) No    (  ) Unknown- Reason: _____  11.	Have you shared eating or drinking utensils with them? (  ) Yes   (  ) No    (  ) Unknown- Reason: ____  12.	Have they sneezed, coughed, or somehow gotten respiratory droplets on you? (  ) Yes   (  ) No    (  ) Unknown- Reason: ______    13.	*Have you been out of [place] within the past 10 days?* (  ) Yes   ( x ) No   (  ) Unknown- Reason: _____  IF YES PLEASE ANSWER THE FOLLOWING QUESTIONS:  14.	Which state/country have you been to? ______  15.	Were you there over 24 hours? (  ) Yes   (  ) No    (  ) Unknown- Reason: ______  16.	Date of return to [facility]: ______

## 2022-12-17 NOTE — BH INPATIENT PSYCHIATRY ASSESSMENT NOTE - NSBHCHARTREVIEWVS_PSY_A_CORE FT
Vital Signs Last 24 Hrs  T(C): 37 (12-17-22 @ 12:31), Max: 37 (12-17-22 @ 05:43)  T(F): 98.6 (12-17-22 @ 12:31), Max: 98.6 (12-17-22 @ 05:43)  HR: 99 (12-17-22 @ 11:53) (84 - 100)  BP: 115/66 (12-17-22 @ 11:53) (115/66 - 138/89)  BP(mean): --  RR: 18 (12-17-22 @ 11:53) (16 - 18)  SpO2: 100% (12-17-22 @ 11:53) (97% - 100%)    Orthostatic VS  12-17-22 @ 12:31  Lying BP: --/-- HR: --  Sitting BP: 124/73 HR: 110  Standing BP: --/-- HR: --  Site: --  Mode: --

## 2022-12-17 NOTE — ED BEHAVIORAL HEALTH NOTE - BEHAVIORAL HEALTH NOTE
NAOMI BARGER contacted pt insurance to obtain authorisation at [phone] and spoke with Mahi TORRES and Marcie TORRES. Marcie TORRES informed naomi that someone will be reaching out within 24-48 hrs with a follow up regarding authorization and provided ref #[id number].

## 2022-12-17 NOTE — BH INPATIENT PSYCHIATRY ASSESSMENT NOTE - NSBHASSESSSUMMFT_PSY_ALL_CORE
52F w/pphx of MDD w/psychosis and anxiety, admitted for disorganization, bizarre behavior, and agitation.    Pt continues to be psychotic and agitated.  Start risperidone 2 mg qhs.   52F w/pphx of MDD w/psychosis and anxiety, admitted for disorganization, bizarre behavior, and agitation.    Pt continues to be psychotic and agitated.  Start risperidone 1 mg qhs.

## 2022-12-17 NOTE — ED ADULT NURSE REASSESSMENT NOTE - NS ED NURSE REASSESS COMMENT FT1
Break coverage RN: Pt received from , placed in room 3. Pt A&Ox4 resting on stretcher. Pt refused vital sgins, states "I do not want you to touch me." Pt educated on importance of vital signs but still refused. Therapeutic communication used. Respirations even and unlabored. Pt offers no complaints at this time. No acute distress noted. Pending [facility] bed assignment. bed in lowest position, side rails up, call bell in hand, safety maintained.
Pt noted to be sonorus; xfer to main ER for CPAP, report to NENA Acosta. Pt xfer to main ER #3.
Pt received back from main ER; pt presents sleepy, awaiting inpatient psychiatric bed when available.
Pt resting in bed with the CPAP machine at this time. Pt refusing medications and VS. MD made aware. Will continue to monitor.
pt is loudly singing in waiting room and is randomly shouting/shrieking.  attempting to redirect without much success.
Pt being moved to . Pt calm and cooperative at this time. Will continue to monitor.
Pt removed from cpap as per pt request. Pt calm and cooperative resting in the stretcher at this time. Will continue to monitor.

## 2022-12-17 NOTE — BH INPATIENT PSYCHIATRY ASSESSMENT NOTE - OTHER PAST PSYCHIATRIC HISTORY (INCLUDE DETAILS REGARDING ONSET, COURSE OF ILLNESS, INPATIENT/OUTPATIENT TREATMENT)
PPH MDD with psychosis & Anxiety. Patient has a history of inpatient admission at [facility] after OD 2 years ago. She has 1 past suicide attempt 2 years ago which led to her inpatient admission. Outpatient with MD in [place] prescriber - Víctor Styles

## 2022-12-17 NOTE — BH INPATIENT PSYCHIATRY ASSESSMENT NOTE - HPI (INCLUDE ILLNESS QUALITY, SEVERITY, DURATION, TIMING, CONTEXT, MODIFYING FACTORS, ASSOCIATED SIGNS AND SYMPTOMS)
As per ER note: "Patient is a 52 year old female unemployed lives with . PPH MDD with psychosis per Psyckes & Anxiety. Patient has a history of inpatient admission at [facility] after OD 2 years ago. She has 1 past suicide attempt 2 years ago which led to her inpatient admission. She has no history of aggression, violence or legal issues She has no history of drug or alcohol. PMH- migraines, asthma, chf, MARGARETH, s/p gastic bypass & chronic pain s/p right ankle surgery. BIBA  for bizarre behavior.  Patient noted to be crying screaming "Sivan! Save Sivan!" She asked evaluator if they were going to take her to Sivan. She stated "she , he murdered her." "Silus, Silus, come out of the closet" and was pointing at the door. Patient then screamed and said "snakes, snakes, they're all over the floor." She then pointed to her arm and stated bugs were crawling all over her. She was AOx1- she did not know where she was, she stated she was in [place] but wasn't sure what kind of building this was. She could not recall her year of birth. She was able to name objects but was unsure regarding the state, country, who the president was, season or date. She stated she doesn't know who she lives with. Patient struggled to participate in interview due to level of disorganization. She then screamed about the snakes and the bugs crawling all over her."    Pt seen with RN present.  Pt could only tolerate limited interview, began stating that her fingers were tingling and that she had to leave room.  Then began yelping "TAVERAS!" repeatedly, stating that there was an evil spirit in the room.  Pt left room and walked with writer to her room, pt identified Sivan as her daughter.  When asked about Silus, pt stops walking, begins crying and wailing loudly, and shouts "Silus" several times on way to her room.  Does not answer any other questions.

## 2022-12-17 NOTE — BH PATIENT PROFILE - HOME MEDICATIONS
carvedilol 6.25 mg oral tablet , 1 tab(s) orally 2 times a day  CPAP ,     albuterol ,  inhaled , As Needed  Singulair 10 mg oral tablet , 1 tab(s) orally once a day (in the evening)

## 2022-12-17 NOTE — BH INPATIENT PSYCHIATRY ASSESSMENT NOTE - CURRENT MEDICATION
MEDICATIONS  (STANDING):  carvedilol 6.25 milliGRAM(s) Oral every 12 hours  influenza   Vaccine 0.5 milliLiter(s) IntraMuscular once  montelukast 10 milliGRAM(s) Oral daily    MEDICATIONS  (PRN):  albuterol    90 MICROgram(s) HFA Inhaler 2 Puff(s) Inhalation every 6 hours PRN Bronchospasm  diphenhydrAMINE 50 milliGRAM(s) Oral every 6 hours PRN eps  diphenhydrAMINE Injectable 50 milliGRAM(s) IntraMuscular once PRN extreme eps  haloperidol     Tablet 5 milliGRAM(s) Oral Once PRN agitation secondary to psychosis  haloperidol    Injectable 5 milliGRAM(s) IntraMuscular Once PRN extreme agitation secondry to psychosis  hydrOXYzine hydrochloride 50 milliGRAM(s) Oral every 6 hours PRN Anxiety  LORazepam     Tablet 2 milliGRAM(s) Oral every 2 hours PRN CIWA score increase by 2 points and current CIWA score GREATER THAN 9  LORazepam   Injectable 2 milliGRAM(s) IntraMuscular once PRN extreme anxiety   MEDICATIONS  (STANDING):  carvedilol 6.25 milliGRAM(s) Oral every 12 hours  influenza   Vaccine 0.5 milliLiter(s) IntraMuscular once  montelukast 10 milliGRAM(s) Oral daily  risperiDONE   Tablet 1 milliGRAM(s) Oral at bedtime    MEDICATIONS  (PRN):  albuterol    90 MICROgram(s) HFA Inhaler 2 Puff(s) Inhalation every 6 hours PRN Bronchospasm  diphenhydrAMINE 50 milliGRAM(s) Oral every 6 hours PRN eps  diphenhydrAMINE Injectable 50 milliGRAM(s) IntraMuscular once PRN extreme eps  haloperidol     Tablet 5 milliGRAM(s) Oral Once PRN agitation secondary to psychosis  haloperidol    Injectable 5 milliGRAM(s) IntraMuscular Once PRN extreme agitation secondry to psychosis  hydrOXYzine hydrochloride 50 milliGRAM(s) Oral every 6 hours PRN Anxiety  LORazepam     Tablet 2 milliGRAM(s) Oral every 2 hours PRN CIWA score increase by 2 points and current CIWA score GREATER THAN 9  LORazepam   Injectable 2 milliGRAM(s) IntraMuscular once PRN extreme anxiety

## 2022-12-17 NOTE — BH INPATIENT PSYCHIATRY ASSESSMENT NOTE - MSE UNSTRUCTURED FT
Appearance: Dressed appropriately.  Somewhat unkempt.   Behavior: Limited cooperation.  Poorly related.   Motor: Psychomotor activated.   Speech: Initially slow with soft volume, then becomes yelling.   Mood: Does not give mood.  Affect: Labile, irritable and crying.   Thought Process: Tangential, illogical.   Associations: Loose  Thought Content: Likely delusions.    Insight: Poor.   Judgment: Poor.   Attention: Poor.   Language: Fluent.  Gait: Intact.

## 2022-12-17 NOTE — BH PATIENT PROFILE - FALL HARM RISK - UNIVERSAL INTERVENTIONS
Bed in lowest position, wheels locked, appropriate side rails in place/Call bell, personal items and telephone in reach/Instruct patient to call for assistance before getting out of bed or chair/Non-slip footwear when patient is out of bed/Emblem to call system/Physically safe environment - no spills, clutter or unnecessary equipment/Purposeful Proactive Rounding/Room/bathroom lighting operational, light cord in reach

## 2022-12-17 NOTE — BH INPATIENT PSYCHIATRY ASSESSMENT NOTE - NSRISKVIOL_PSY_A_CORE
"Nutrition Services: Day 0 of admit. Lakisha Bhatti is a 74 y.o. female with admitting DX of Lumbar compression fracture.  Consult received for 14-23 lb weight loss x 3 months, poor appetite.    UBW is 67.3-68.2 kg (148-150 lb) per patient verbalization and she last recalls weighing this one year ago. Patient reports losing 22.7 kg (50 lb) x 4 weeks. PO <50% usual PO intake x 1 year. She stated her appetite was low PTA and currently. Patient said she cannot remember the last time she finished a meal. At home, patient reports consuming 1 bag of chips, some pieces of meat, and a salad daily. Patient stated her appearance has changed over the last year and she now looks \"old.\" MD gave consent for nutrition supplements.     Assessment:  Ht: 160 cm, Wt 11/4: 36.3 kg (80 lb) via bed scale, BMI: 14.18 - underweight  Diet/Intake: regular - Per chart pt PO 50-75% x 2 meals     Evaluation:   1. Weight was 38.8 kg (85 lb) on 9/24/2019 per chart review. 2.3 kg (5 lb) weight loss, 5% decrease x 11 days.   2. Pt has severe protein-calorie malnutrition per MD note on 11/4.  3. Meds: calcitonin, zofran, vitamin D, potassium chloride, bowel protocol  4. Fluids: NaCl with KCl @ 83 ml/hr    Malnutrition Risk: Pt with severe malnutrition in the context of chronic illness related malnutrition related to rheumatoid arthritis as evidenced by severe thoracic fat loss, severe upper arm fat loss, severe temple muscle loss, and PO <50% of usual PO intake x 1 year.     Recommendations/Plan:   1. Boost Plus BID.   2. Encourage intake of meals/supplements.  3. Document intake of all meals/supplements as % taken in ADL's to provide interdisciplinary communication across all shifts.   4. Monitor weight.  5. Nutrition rep will continue to see patient for ongoing meal and snack preferences.    RD following.    " Low

## 2022-12-18 PROCEDURE — 99232 SBSQ HOSP IP/OBS MODERATE 35: CPT

## 2022-12-18 RX ORDER — RISPERIDONE 4 MG/1
2 TABLET ORAL AT BEDTIME
Refills: 0 | Status: DISCONTINUED | OUTPATIENT
Start: 2022-12-18 | End: 2022-12-21

## 2022-12-18 RX ADMIN — MONTELUKAST 10 MILLIGRAM(S): 4 TABLET, CHEWABLE ORAL at 09:01

## 2022-12-18 RX ADMIN — RISPERIDONE 2 MILLIGRAM(S): 4 TABLET ORAL at 21:06

## 2022-12-18 RX ADMIN — CARVEDILOL PHOSPHATE 6.25 MILLIGRAM(S): 80 CAPSULE, EXTENDED RELEASE ORAL at 21:06

## 2022-12-18 RX ADMIN — CARVEDILOL PHOSPHATE 6.25 MILLIGRAM(S): 80 CAPSULE, EXTENDED RELEASE ORAL at 09:01

## 2022-12-18 NOTE — BH INPATIENT PSYCHIATRY PROGRESS NOTE - NSBHMETABOLIC_PSY_ALL_CORE_FT
BMI:   HbA1c:   Glucose: POCT Blood Glucose.: 128 mg/dL (12-14-22 @ 21:01)    BP: 115/66 (12-17-22 @ 11:53) (115/66 - 138/89)  Lipid Panel:

## 2022-12-18 NOTE — BH INPATIENT PSYCHIATRY PROGRESS NOTE - NSBHFUPINTERVALHXFT_PSY_A_CORE
As per staff report, continues to be disorganized.  Interview with pt limited today.  Pt states she is ok and is sleeping/eating adequately.  Denies hearing any voices.

## 2022-12-18 NOTE — BH INPATIENT PSYCHIATRY PROGRESS NOTE - CURRENT MEDICATION
MEDICATIONS  (STANDING):  carvedilol 6.25 milliGRAM(s) Oral every 12 hours  influenza   Vaccine 0.5 milliLiter(s) IntraMuscular once  montelukast 10 milliGRAM(s) Oral daily  risperiDONE   Tablet 1 milliGRAM(s) Oral at bedtime    MEDICATIONS  (PRN):  albuterol    90 MICROgram(s) HFA Inhaler 2 Puff(s) Inhalation every 6 hours PRN Bronchospasm  diphenhydrAMINE 50 milliGRAM(s) Oral every 6 hours PRN eps  diphenhydrAMINE Injectable 50 milliGRAM(s) IntraMuscular once PRN extreme eps  haloperidol     Tablet 5 milliGRAM(s) Oral Once PRN agitation secondary to psychosis  haloperidol    Injectable 5 milliGRAM(s) IntraMuscular Once PRN extreme agitation secondry to psychosis  hydrOXYzine hydrochloride 50 milliGRAM(s) Oral every 6 hours PRN Anxiety  LORazepam     Tablet 2 milliGRAM(s) Oral every 2 hours PRN CIWA score increase by 2 points and current CIWA score GREATER THAN 9  LORazepam   Injectable 2 milliGRAM(s) IntraMuscular once PRN extreme anxiety

## 2022-12-18 NOTE — BH INPATIENT PSYCHIATRY PROGRESS NOTE - NSBHASSESSSUMMFT_PSY_ALL_CORE
52F w/pphx of MDD w/psychosis and anxiety, admitted for disorganization, bizarre behavior, and agitation.    Pt continues to be psychotic and agitated.  Titrate risperidone to 2 mg.  Continue CO 11P-7A for disorganization while on CPAP.

## 2022-12-18 NOTE — BH INPATIENT PSYCHIATRY PROGRESS NOTE - NSBHCHARTREVIEWVS_PSY_A_CORE FT
Vital Signs Last 24 Hrs  T(C): 36.7 (12-18-22 @ 07:36), Max: 36.7 (12-18-22 @ 07:36)  T(F): 98.1 (12-18-22 @ 07:36), Max: 98.1 (12-18-22 @ 07:36)  HR: --  BP: --  BP(mean): --  RR: 20 (12-18-22 @ 07:36) (20 - 20)  SpO2: 98% (12-18-22 @ 06:13) (98% - 99%)    Orthostatic VS  12-18-22 @ 07:36  Lying BP: --/-- HR: --  Sitting BP: 126/86 HR: 98  Standing BP: 119/84 HR: 113  Site: --  Mode: --  Orthostatic VS  12-17-22 @ 12:31  Lying BP: --/-- HR: --  Sitting BP: 124/73 HR: 110  Standing BP: --/-- HR: --  Site: --  Mode: --

## 2022-12-18 NOTE — PSYCHIATRIC REHAB INITIAL EVALUATION - NSBHALCSUBCHOICE_PSY_ALL_CORE
Patient denied alcohol use. Patient reported that she consumed an edible (i.e. cannabis) prior to admission.

## 2022-12-18 NOTE — PSYCHIATRIC REHAB INITIAL EVALUATION - NSBHPRRECOMMEND_PSY_ALL_CORE
Writer met with patient to orient her to psychiatric rehabilitation staff and services. Throughout the session, patient was pleasant, receptive, and forthcoming with personal history. Patient identified her reason for admission as worsening anxiety, worry, and mood swings. Patient denied SI and HI. Patient endorsed AH and VH. Patient discussed feeling worried that something bad will happen to her immediate family members. Writer and patient established a collaborative treatment goal for the patient to work on over the next 7 days. Patient expressed interest in seeking new outpatient resources (i.e. therapy and psychiatrist) to support her recovery at discharge. Psychiatric rehabilitation staff will continue to provide encouragement, support, psychotherapy, and psychoeducation to assist the patient in the progression of her treatment goal.

## 2022-12-19 ENCOUNTER — EMERGENCY (EMERGENCY)
Facility: HOSPITAL | Age: 52
LOS: 1 days | Discharge: HOME | End: 2022-12-19
Attending: STUDENT IN AN ORGANIZED HEALTH CARE EDUCATION/TRAINING PROGRAM | Admitting: EMERGENCY MEDICINE

## 2022-12-19 VITALS
OXYGEN SATURATION: 98 % | DIASTOLIC BLOOD PRESSURE: 91 MMHG | TEMPERATURE: 99 F | HEART RATE: 93 BPM | SYSTOLIC BLOOD PRESSURE: 138 MMHG | RESPIRATION RATE: 18 BRPM

## 2022-12-19 VITALS
DIASTOLIC BLOOD PRESSURE: 72 MMHG | SYSTOLIC BLOOD PRESSURE: 114 MMHG | RESPIRATION RATE: 16 BRPM | OXYGEN SATURATION: 96 % | HEART RATE: 103 BPM | TEMPERATURE: 98 F

## 2022-12-19 LAB
ALBUMIN SERPL ELPH-MCNC: 3.9 G/DL — SIGNIFICANT CHANGE UP (ref 3.3–5)
ALP SERPL-CCNC: 83 U/L — SIGNIFICANT CHANGE UP (ref 40–120)
ALT FLD-CCNC: 9 U/L — SIGNIFICANT CHANGE UP (ref 4–33)
ANION GAP SERPL CALC-SCNC: 11 MMOL/L — SIGNIFICANT CHANGE UP (ref 7–14)
AST SERPL-CCNC: 18 U/L — SIGNIFICANT CHANGE UP (ref 4–32)
BILIRUB SERPL-MCNC: 0.4 MG/DL — SIGNIFICANT CHANGE UP (ref 0.2–1.2)
BUN SERPL-MCNC: 13 MG/DL — SIGNIFICANT CHANGE UP (ref 7–23)
CALCIUM SERPL-MCNC: 8.9 MG/DL — SIGNIFICANT CHANGE UP (ref 8.4–10.5)
CHLORIDE SERPL-SCNC: 103 MMOL/L — SIGNIFICANT CHANGE UP (ref 98–107)
CO2 SERPL-SCNC: 22 MMOL/L — SIGNIFICANT CHANGE UP (ref 22–31)
CREAT SERPL-MCNC: 0.64 MG/DL — SIGNIFICANT CHANGE UP (ref 0.5–1.3)
EGFR: 108 ML/MIN/1.73M2 — SIGNIFICANT CHANGE UP
GLUCOSE BLDC GLUCOMTR-MCNC: 174 MG/DL — HIGH (ref 70–99)
GLUCOSE SERPL-MCNC: 114 MG/DL — HIGH (ref 70–99)
HCT VFR BLD CALC: 34.2 % — LOW (ref 34.5–45)
HGB BLD-MCNC: 10.4 G/DL — LOW (ref 11.5–15.5)
MAGNESIUM SERPL-MCNC: 2 MG/DL — SIGNIFICANT CHANGE UP (ref 1.6–2.6)
MCHC RBC-ENTMCNC: 22.5 PG — LOW (ref 27–34)
MCHC RBC-ENTMCNC: 30.4 GM/DL — LOW (ref 32–36)
MCV RBC AUTO: 74 FL — LOW (ref 80–100)
NRBC # BLD: 0 /100 WBCS — SIGNIFICANT CHANGE UP (ref 0–0)
NRBC # FLD: 0 K/UL — SIGNIFICANT CHANGE UP (ref 0–0)
NT-PROBNP SERPL-SCNC: 29 PG/ML — SIGNIFICANT CHANGE UP
PLATELET # BLD AUTO: 440 K/UL — HIGH (ref 150–400)
POTASSIUM SERPL-MCNC: 4.1 MMOL/L — SIGNIFICANT CHANGE UP (ref 3.5–5.3)
POTASSIUM SERPL-SCNC: 4.1 MMOL/L — SIGNIFICANT CHANGE UP (ref 3.5–5.3)
PROT SERPL-MCNC: 8.1 G/DL — SIGNIFICANT CHANGE UP (ref 6–8.3)
RBC # BLD: 4.62 M/UL — SIGNIFICANT CHANGE UP (ref 3.8–5.2)
RBC # FLD: 16.9 % — HIGH (ref 10.3–14.5)
SODIUM SERPL-SCNC: 136 MMOL/L — SIGNIFICANT CHANGE UP (ref 135–145)
TROPONIN T, HIGH SENSITIVITY RESULT: <6 NG/L — SIGNIFICANT CHANGE UP
WBC # BLD: 13.83 K/UL — HIGH (ref 3.8–10.5)
WBC # FLD AUTO: 13.83 K/UL — HIGH (ref 3.8–10.5)

## 2022-12-19 PROCEDURE — 93010 ELECTROCARDIOGRAM REPORT: CPT | Mod: 59

## 2022-12-19 PROCEDURE — 70450 CT HEAD/BRAIN W/O DYE: CPT | Mod: 26,MA

## 2022-12-19 PROCEDURE — 71045 X-RAY EXAM CHEST 1 VIEW: CPT | Mod: 26

## 2022-12-19 PROCEDURE — 99285 EMERGENCY DEPT VISIT HI MDM: CPT | Mod: 25

## 2022-12-19 PROCEDURE — 12011 RPR F/E/E/N/L/M 2.5 CM/<: CPT

## 2022-12-19 PROCEDURE — 73070 X-RAY EXAM OF ELBOW: CPT | Mod: 26,RT

## 2022-12-19 RX ORDER — LANOLIN ALCOHOL/MO/W.PET/CERES
3 CREAM (GRAM) TOPICAL AT BEDTIME
Refills: 0 | Status: DISCONTINUED | OUTPATIENT
Start: 2022-12-19 | End: 2023-01-04

## 2022-12-19 RX ORDER — SODIUM CHLORIDE 9 MG/ML
1000 INJECTION INTRAMUSCULAR; INTRAVENOUS; SUBCUTANEOUS ONCE
Refills: 0 | Status: COMPLETED | OUTPATIENT
Start: 2022-12-19 | End: 2022-12-19

## 2022-12-19 RX ORDER — SODIUM CHLORIDE 9 MG/ML
100 INJECTION INTRAMUSCULAR; INTRAVENOUS; SUBCUTANEOUS ONCE
Refills: 0 | Status: COMPLETED | OUTPATIENT
Start: 2022-12-19 | End: 2022-12-19

## 2022-12-19 RX ORDER — TETANUS TOXOID, REDUCED DIPHTHERIA TOXOID AND ACELLULAR PERTUSSIS VACCINE, ADSORBED 5; 2.5; 8; 8; 2.5 [IU]/.5ML; [IU]/.5ML; UG/.5ML; UG/.5ML; UG/.5ML
0.5 SUSPENSION INTRAMUSCULAR ONCE
Refills: 0 | Status: COMPLETED | OUTPATIENT
Start: 2022-12-19 | End: 2022-12-19

## 2022-12-19 RX ADMIN — SODIUM CHLORIDE 100 MILLILITER(S): 9 INJECTION INTRAMUSCULAR; INTRAVENOUS; SUBCUTANEOUS at 11:24

## 2022-12-19 RX ADMIN — TETANUS TOXOID, REDUCED DIPHTHERIA TOXOID AND ACELLULAR PERTUSSIS VACCINE, ADSORBED 0.5 MILLILITER(S): 5; 2.5; 8; 8; 2.5 SUSPENSION INTRAMUSCULAR at 11:24

## 2022-12-19 RX ADMIN — SODIUM CHLORIDE 1000 MILLILITER(S): 9 INJECTION INTRAMUSCULAR; INTRAVENOUS; SUBCUTANEOUS at 15:22

## 2022-12-19 RX ADMIN — RISPERIDONE 2 MILLIGRAM(S): 4 TABLET ORAL at 20:58

## 2022-12-19 RX ADMIN — CARVEDILOL PHOSPHATE 6.25 MILLIGRAM(S): 80 CAPSULE, EXTENDED RELEASE ORAL at 20:58

## 2022-12-19 NOTE — BH CHART NOTE - NSEVENTNOTEFT_PSY_ALL_CORE
[facility] Inpatient to ED Transfer Summary    Reason for Transfer/Medical Summary:  Ms. Barton is a 53yo woman with PMH significant for CHF and hx of gastric bypass, admitted to [facility] for psychosis, presenting with episode of LOC this morning.     On exam, pt alert, mentating normally, reports that she was feeling light headed upon awakening, had a BM, then started walking toward nursing room to request her vitals be taken when she lost consciousness. She last remembers reaching toward the wall to brace herself and woke up on the floor. Fall not witnessed by staff. Pt reports focal tenderness over R forehead so presumed to strike head. BP reading immediately following fall showed two readings of 60s/39-44. POCT glucose of 174. Pt reports she has hx of CHF for which she is on Carvedilol 6.25mg BID. Pt recently started on Risperdal titrated to 2mg qHS. Pt denies palpitations, chest pain, SOB, HA, vision changes.    Risperdal may be exacerbating hypotension 2/2 CHF.  Pt to be transferred to ED for further evaluation of cardiogenic vs. vasovagal syncope.       PAST MEDICAL & SURGICAL HISTORY:  Asthma      Migraines  CHF (congestive heart failure)  Dx 2014  Obstructive sleep apnea on CPAP  Gastric bypass status for obesity  H/O tubal ligation  Fracture of left foot  Four surgeries: #1 and #2 in 2004, #3 ~2010, and #4 in 2011.      Allergies  No Known Allergies  Intolerances      MEDICATIONS  (STANDING):  carvedilol 6.25 milliGRAM(s) Oral every 12 hours  influenza   Vaccine 0.5 milliLiter(s) IntraMuscular once  montelukast 10 milliGRAM(s) Oral daily  risperiDONE   Tablet 2 milliGRAM(s) Oral at bedtime    MEDICATIONS  (PRN):  albuterol    90 MICROgram(s) HFA Inhaler 2 Puff(s) Inhalation every 6 hours PRN Bronchospasm  diphenhydrAMINE 50 milliGRAM(s) Oral every 6 hours PRN eps  diphenhydrAMINE Injectable 50 milliGRAM(s) IntraMuscular once PRN extreme eps  haloperidol     Tablet 5 milliGRAM(s) Oral Once PRN agitation secondary to psychosis  haloperidol    Injectable 5 milliGRAM(s) IntraMuscular Once PRN extreme agitation secondry to psychosis  hydrOXYzine hydrochloride 50 milliGRAM(s) Oral every 6 hours PRN Anxiety  LORazepam     Tablet 2 milliGRAM(s) Oral every 2 hours PRN CIWA score increase by 2 points and current CIWA score GREATER THAN 9  LORazepam   Injectable 2 milliGRAM(s) IntraMuscular once PRN extreme anxiety      CAPILLARY BLOOD GLUCOSE  POCT Blood Glucose.: 174 mg/dL (19 Dec 2022 07:17)      PHYSICAL EXAM:  GENERAL: NAD, well-developed  HEAD:  Atraumatic, Normocephalic. With focal TTP over R frontal region without hematoma or abrasions  EYES: EOMI, PERRLA, conjunctiva and sclera clear  CHEST/LUNG: Clear to auscultation bilaterally; No wheeze  HEART: Regular rate and rhythm; No murmurs, rubs, or gallops  ABDOMEN: Soft, Nontender, Nondistended; Bowel sounds present  EXTREMITIES:  No pitting edema  PSYCH: AAOx3  NEUROLOGY: AOx3, mentating well, no focal deficits  SKIN: No rashes or lesions    LABS:  Comprehensive Metabolic Panel (12.16.22 @ 23:00)   Sodium, Serum: 137 mmol/L   Potassium, Serum: 3.8 mmol/L   Chloride, Serum: 104 mmol/L   Carbon Dioxide, Serum: 23 mmol/L   Anion Gap, Serum: 10 mmol/L   Blood Urea Nitrogen, Serum: 12 mg/dL   Creatinine, Serum: 0.67 mg/dL WBC Count: 7.88 K/uL   RBC Count: 4.02 M/uL   Hemoglobin: 8.7 g/dL   Hematocrit: 29.7 %         Psychiatry Section:  Psychiatric Summary/[facility] admitting diagnosis:      52F w/pphx of MDD w/psychosis and anxiety, admitted for disorganization, bizarre behavior, and agitation. Pt was started on Risperdal titrated to 2mg qHS.       Psychiatric Recommendations:  - Discontinue Risperdal 2mg due to risk of hypotension    Observation status (check one):   ( x) Constant Observation  ( ) Enhanced care  ( ) Routine checks    Risk Status (check all that apply if present):  ( ) at risk for suicide/self-injury  ( ) at risk for aggressive behavior  (x ) at risk for elopement  ( ) other risk:

## 2022-12-19 NOTE — ED PROVIDER NOTE - NSFOLLOWUPINSTRUCTIONS_ED_ALL_ED_FT
** Make sure you stay hydrated. If you are dizzy when you stand up, sit down. In the future, stand up slowly and make sure you are not dizzy before you start walking.     ** Follow up with your primary care doctor in the next 72 hours.     ** Go to the nearest Emergency Department if you experience any new or concerning symptoms, such as:   - chest pain  - difficulty breathing  - passing out  - unable to eat or drink  - unable to move or feel part of your body  - fever, chills

## 2022-12-19 NOTE — ED PROVIDER NOTE - PATIENT PORTAL LINK FT
You can access the FollowMyHealth Patient Portal offered by [facility] by registering at the following website: [email or web address]. By joining [employer]’s FollowMyHealth portal, you will also be able to view your health information using other applications (apps) compatible with our system.

## 2022-12-19 NOTE — ED ADULT TRIAGE NOTE - CHIEF COMPLAINT QUOTE
From [facility] with staff, pt c/o dizziness this AM, found to be hypotensive at facility (80's systolic), pt hit head and elbow into wall, no LOC, speaking clear. , hx of HTN From [facility] with staff, pt c/o dizziness this AM, found to be hypotensive at facility (80's systolic), pt hit head and right elbow into wall, no LOC, speaking clear. , hx of HTN

## 2022-12-19 NOTE — ED PROVIDER NOTE - CLINICAL SUMMARY MEDICAL DECISION MAKING FREE TEXT BOX
52-year-old female history of CHF, MARGARETH, gastric bypass, tubal ligation, currently inpatient at [facility], presents with staff for dizziness and weakness syncope this morning.  Right elbow laceration 1.5 cm, tender right frontal forehead, no midline C-spine tenderness, nonfocal neurological exam.  Differential includes, but not limited to: Vasovagal syncope, orthostatic syncope, cardiogenic syncope.  Most likely orthostatic given reproducible symptoms of dizziness in the emergency room.  Given history of syncope loss of consciousness and right head pain will obtain CT to rule out any acute injuries.  Right eyebrow laceration will be repaired with nonabsorbable sutures, x-ray to rule out underlying fracture.  Reassess for disposition.

## 2022-12-19 NOTE — ED ADULT NURSE NOTE - CHIEF COMPLAINT QUOTE
From [facility] with staff, pt c/o dizziness this AM, found to be hypotensive at facility (80's systolic), pt hit head and right elbow into wall, no LOC, speaking clear. , hx of HTN

## 2022-12-19 NOTE — BH SOCIAL WORK INITIAL PSYCHOSOCIAL EVALUATION - OTHER PAST PSYCHIATRIC HISTORY (INCLUDE DETAILS REGARDING ONSET, COURSE OF ILLNESS, INPATIENT/OUTPATIENT TREATMENT)
Pt. is a 53 y/o female domiciled with  with history of MDD with psychosis and anxiety with prior psych. hospitalization at [facility] about 2 years ago due to OD. Pt. has one prior suicide attempt which was the precipitant to the admission to [facility].  Pt. was bib spouse due to bizarre behavior

## 2022-12-19 NOTE — BH CHART NOTE - NSEVENTNOTEFT_PSY_ALL_CORE
Call placed to  Abelardo Medellin ([phone]) who is informed of an update on the status of patient who is still being treated in [facility] ED at this time following a suspected fall at [facility] 2W unit in the AM.  He expressed an appreciation for the update and is advised that this physician will follow up with him once there is an update, either today before 4:30pm or tomorrow morning.   expressed understanding and states he's on campus and will visit wife in the ED at this time. Steroids, breathing tx  abx- Rocephin and Z max

## 2022-12-19 NOTE — ED PROVIDER NOTE - PHYSICAL EXAMINATION
GEN: Patient awake alert NAD.   HEENT: normocephalic, atraumatic, EOMI, no nystagmus, no scleral icterus, moist MM  CARDIAC: RRR, S1, S2, no murmur.   PULM: CTA B/L no wheeze, rhonchi, rales.   ABD: soft NT, ND, no rebound no guarding  MSK: Moving all extremities, no edema. 5/5 strength and full ROM in all extremities.     NEURO: A&Ox3, gait normal, no focal neurological deficits, CN 2-12 grossly intact  SKIN: + right elbow laceration o1.5cm, warm, dry, no rash.

## 2022-12-19 NOTE — CHART NOTE - NSCHARTNOTEFT_GEN_A_CORE
Nutrition consult for hx of bariatric surgery received. RDN attempted to visit patient today; however, patient went to ED due to dizziness and syncope this morning and was not available for interview. RDN will follow up with patient per Nutrition policy and protocols.

## 2022-12-19 NOTE — ED PROVIDER NOTE - OBJECTIVE STATEMENT
52-year-old female history of CHF, MARGARETH, gastric bypass, tubal ligation, currently inpatient at [facility], presents with staff for dizziness and weakness syncope this morning.  Patient states that she felt dizzy and nauseous after she tried to have a bowel movement.  Patient was standing in line to get her vitals taken, when she had syncopal episode.  Last thing patient recalls was standing in line.  In the ED patient is comfortable moving all extremities right elbow laceration, complains of mild dizziness when she gets out of bed into sitting position. 52-year-old female history of CHF, MARGARETH, gastric bypass, tubal ligation, currently inpatient at [facility] presents with staff for dizziness and weakness syncope this morning.  Patient states that she felt dizzy and nauseous after she tried to have a bowel movement.  Patient was standing in line to get her vitals taken, when she had syncopal episode.  Last thing patient recalls was standing in line.  In the ED patient is comfortable moving all extremities right elbow laceration, complains of mild dizziness when she gets out of bed into sitting position.

## 2022-12-19 NOTE — ED PROVIDER NOTE - PROGRESS NOTE DETAILS
Lexie Granda M.D. Tox Fellow  persistent tachycardia. Sp 1L IVF. Patient not in acute heart failure exacerbation, no signs of fluid overload on exam. will order second L IVF. Lexie Granda M.D. Tox Fellow  Patient reassessed that she is at baseline.  Around 4 vitals normalized, heart rate now 93.  Patient had no longer dizzy upon.  Dr. Moreland at [facility] aware of workup and results, agreeable to plan to DC back to [facility]. Lexie Granda M.D. Tox Fellow  Patient reassessed that she is at baseline, AOx4, vitals normalized, heart rate now 93.  Patient no longer dizzy upon standing.  Dr. Moreland at [facility] aware of workup and results, agreeable to plan to DC back to [facility]. Lexie Granda M.D. Tox Fellow  [facility] patient has 2 non-resorable sutures that need to be removed in 7 days from placement.

## 2022-12-19 NOTE — ED PROVIDER NOTE - ATTENDING CONTRIBUTION TO CARE
12 y/o M with PMHx sig for Schizophrenia on Clonazepine who presents to the Emergency Department after unwitnessed head injury. Patient complaining of pain to right forehead, no obvious hematoma or injury.  Denies any tenderness to palpation of entire spine.  Has nonfocal neurological exam.  Denies any preceding chest pain, palpitation, shortness of breath, severe headache.  Patient was hypotensive on vitals after syncopal episode.  Here she is orthostatic positive.  EKG unremarkable.  Suspect vasovagal syncope, plan for labs, head CT, also noted to have superficial laceration over right elbow, full range of motion low suspicion for fracture but will obtain x-ray. 950after unwitnessed head injury and syncope at [facility]. Patient complaining of pain to right forehead, no obvious hematoma or injury.  Denies any tenderness to palpation of entire spine.  Has nonfocal neurological exam.  Denies any preceding chest pain, palpitation, shortness of breath, severe headache.  Patient was hypotensive on vitals after syncopal episode.  Here she is orthostatic positive.  EKG unremarkable.  Suspect vasovagal syncope, plan for labs, head CT, also noted to have superficial laceration over right elbow, full range of motion low suspicion for fracture but will obtain x-ray.

## 2022-12-19 NOTE — ED PROVIDER NOTE - NS ED ROS FT
GENERAL: No fever, chills  EYES: no vision changes, no discharge.   ENT: no difficulty swallowing or speaking   CARDIAC: no chest pain/pressure, SOB, lower extremity swelling  PULMONARY: no cough, SOB  GI: no abdominal pain, n/v/d  : no dysuria, no hematuria  SKIN: no rashes, + laceration  NEURO: no headache, + lightheadedness +LOC  MSK: No joint pain, myalgia, weakness.

## 2022-12-20 ENCOUNTER — APPOINTMENT (OUTPATIENT)
Dept: INTERNAL MEDICINE | Facility: CLINIC | Age: 52
End: 2022-12-20

## 2022-12-20 PROCEDURE — 99223 1ST HOSP IP/OBS HIGH 75: CPT

## 2022-12-20 PROCEDURE — 99231 SBSQ HOSP IP/OBS SF/LOW 25: CPT

## 2022-12-20 RX ORDER — MONTELUKAST 4 MG/1
10 TABLET, CHEWABLE ORAL ONCE
Refills: 0 | Status: COMPLETED | OUTPATIENT
Start: 2022-12-20 | End: 2022-12-20

## 2022-12-20 RX ORDER — MONTELUKAST 4 MG/1
10 TABLET, CHEWABLE ORAL DAILY
Refills: 0 | Status: DISCONTINUED | OUTPATIENT
Start: 2022-12-20 | End: 2023-01-04

## 2022-12-20 RX ORDER — CARVEDILOL PHOSPHATE 80 MG/1
6.25 CAPSULE, EXTENDED RELEASE ORAL EVERY 12 HOURS
Refills: 0 | Status: DISCONTINUED | OUTPATIENT
Start: 2022-12-20 | End: 2023-01-04

## 2022-12-20 RX ORDER — MONTELUKAST 4 MG/1
10 TABLET, CHEWABLE ORAL AT BEDTIME
Refills: 0 | Status: DISCONTINUED | OUTPATIENT
Start: 2022-12-20 | End: 2022-12-20

## 2022-12-20 RX ADMIN — CARVEDILOL PHOSPHATE 6.25 MILLIGRAM(S): 80 CAPSULE, EXTENDED RELEASE ORAL at 20:17

## 2022-12-20 RX ADMIN — CARVEDILOL PHOSPHATE 6.25 MILLIGRAM(S): 80 CAPSULE, EXTENDED RELEASE ORAL at 10:24

## 2022-12-20 RX ADMIN — RISPERIDONE 2 MILLIGRAM(S): 4 TABLET ORAL at 20:05

## 2022-12-20 RX ADMIN — MONTELUKAST 10 MILLIGRAM(S): 4 TABLET, CHEWABLE ORAL at 10:24

## 2022-12-20 NOTE — DIETITIAN INITIAL EVALUATION ADULT - ORAL INTAKE PTA/DIET HISTORY
Noted per ED  noted 12/16, patient with decreased po intake since 12/11. Patient reports she does not take any oral nutrition supplement/protein shakes at home. Patient states she used to take Multivitamin, Vitamin D3 and B12 s/p her gastric bypass surgery ~10 yrs ago, but over the past few yrs she had stopped taking them due to she "didn't followed up with her doctor." NKFA reported, but dislikes pork.

## 2022-12-20 NOTE — DIETITIAN INITIAL EVALUATION ADULT - OTHER INFO
Patient is a 51 y/o female with PPHx of MDD, psychosis, and anxiety, with PMHx of migraines, asthma, CHF, MARGARETH on CPAP, s/p gastic bypass & chronic pain s/p right ankle surgery, BIBA  for bizarre behavior. 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    Writer met with patient in her room today who was able to engage in the interview. Patient reports current appetite has improved the past few days with PO ~50-75% at meals. Patient has missing teeth but denies chewing/swallowing difficulties at meals, declined diet texture/consistency downgrade. Food preferences taken and implemented on CBoard. Patient denies GI distress (nausea/vomiting/diarrhea/constipation). Patient declined nutrition supplement at this time, but  amenable to take Multivitamin w/ minerals as needed. Writer encouraged po intake as tolerated, patient verbalized understanding.    No current Ht/Wt record in chart at this time. Per HIE - height: 157.5cm, weight: 195lb (11/16/20).  Patient states her recent wt ~183lb and wt lost was intentional due to obesity and s/p gastric bypass.

## 2022-12-20 NOTE — CONSULT NOTE ADULT - ASSESSMENT
52M with PMH of MDD, MARGARETH on CPAP, asthma, HTN, gastric bypass surgery who presented to ED with bizarre and paranoid behavior. Medicine called for evaluation s/p syncopal episode on 12/19. ED chart reviewed. ED labs and VS reviewed. Initial set of orthostatic w/ -->117. Repeat set after fluids showed no significant drop. HR was initially tachy to 110s that improved to low 90s. Her initial labs notable for elevated WBC/Hb/Plt -- all of which could indicate hemoconcentration from hypovolemia. BMP/trop/BNP were all reassuring. No sig metabolic derangements noted. Imaging including CTH/CXR, XR of elbow reviewed. No acute pathologies appreciated. Pt dx w/ vasovagal syncope and DC back to [facility].    #Syncope  -Agree w/ assessment that this is most likely 2' vasovagal syncope in setting of prolonged standing that was likely exacerbated by volume depletion.  -Encourage adequate PO intake.  -No need to changes her chronic meds at this time.     #Elbow laceration  -Sutures to be removed in 7d as per ED -- 12/26.    #MARGARETH on CPAP  -Cont CPAP    #Chronic HFrecE  -Stable. Euvolemic at present. Pt states she is not on a diuretic.  -Cont carvedilol.  -OP f/u.    #Asthma  -Stable. Cont montelukast.   -Albuterol PRN    #MDD  -Further management as per psych.    Pls call back with any questions. [id number].

## 2022-12-20 NOTE — CONSULT NOTE ADULT - SUBJECTIVE AND OBJECTIVE BOX
Dani Almonte MD ([place])  Division of Hospital Medicine  Pager [phone]    HPI  52M with PMH of MDD, MARGARETH on CPAP, asthma, HTN, gastric bypass surgery who presented to ED with bizarre and paranoid behavior. Medicine called for evaluation s/p syncopal episode on 12/19. Per pt, she was standing in line waiting for food yesterday when she suddenly passed out. She had no recollection of event. She does not duration of episode.  She did not recall any prodromal symptoms. She can only remember waking up and being surrounded by people. She denied any associated stool/urinary incontinence. She denied any notable shaking/tremors. She reports having a similar episode in the past, but does not recall the interventions or diagnosis she received. The patient was transferred to the ED where she was hydrated, sutured at her elbow for a lac, and had routine labs done. She initially had dizziness but that resolved w/ fluids. Eventually she came back to [facility] for further care after she was cleared.     ROS:  All other systems negative except as above.    PAST MEDICAL & SURGICAL HISTORY:  Asthma      Migraines      CHF (congestive heart failure)  Dx 2014      Obstructive sleep apnea on CPAP      Gastric bypass status for obesity      H/O tubal ligation      Fracture of left foot  Four surgeries: #1 and #2 in 2004, #3 ~2010, and #4 in 2011.            Allergies:   No Known Allergies        Meds:  MEDICATIONS  (STANDING):  carvedilol 6.25 milliGRAM(s) Oral every 12 hours  influenza   Vaccine 0.5 milliLiter(s) IntraMuscular once  montelukast 10 milliGRAM(s) Oral daily  risperiDONE   Tablet 2 milliGRAM(s) Oral at bedtime    MEDICATIONS  (PRN):  albuterol    90 MICROgram(s) HFA Inhaler 2 Puff(s) Inhalation every 6 hours PRN Bronchospasm  diphenhydrAMINE 50 milliGRAM(s) Oral every 6 hours PRN eps  diphenhydrAMINE Injectable 50 milliGRAM(s) IntraMuscular once PRN extreme eps  haloperidol     Tablet 5 milliGRAM(s) Oral Once PRN agitation secondary to psychosis  haloperidol    Injectable 5 milliGRAM(s) IntraMuscular Once PRN extreme agitation secondry to psychosis  hydrOXYzine hydrochloride 50 milliGRAM(s) Oral every 6 hours PRN Anxiety  LORazepam     Tablet 2 milliGRAM(s) Oral every 2 hours PRN CIWA score increase by 2 points and current CIWA score GREATER THAN 9  LORazepam   Injectable 2 milliGRAM(s) IntraMuscular once PRN extreme anxiety  melatonin. 3 milliGRAM(s) Oral at bedtime PRN Insomnia        SHx:  No tobacco, drug, ETOH use.  .  Not working    FHx:  DM    Physical Exam:  Vital Signs Last 24 Hrs  T(C): 36.6 (20 Dec 2022 07:23), Max: 37.7 (19 Dec 2022 14:30)  T(F): 97.8 (20 Dec 2022 07:23), Max: 99.8 (19 Dec 2022 14:30)  HR: 93 (19 Dec 2022 17:05) (93 - 93)  BP: 138/91 (19 Dec 2022 17:05) (138/91 - 138/91)  BP(mean): --  RR: 18 (20 Dec 2022 07:23) (18 - 18)  SpO2: 99% (20 Dec 2022 07:23) (98% - 99%)    Parameters below as of 20 Dec 2022 06:10      O2 Concentration (%): 97    Gen- In chair, comfortable, NAD  Eyes- EOMI, PERRLA, nonicteric.  EMNT- Fair dentition. MMM. No tonsilar exudates. No posterior pharynx erythema.  Neck- Supple. No masses. No tracheal deviation.  Resp- CTAB, good effort. No r/r/w. No accessory muscle use.  CVS- RRR, S1S2, no g/r/m. No LE edema.  GI- Soft abd, NT, ND, +BSx4. No HSM.  MSK- No C/C. ROM intact. No crepitus.  Neuro- CN II-XII intact. Speech fluent/face symmetric. Sensation intact.  Skin- No rashes/ulcers. Warm/moist.  Psych- AAOx3. depressed mood/flat affect.      Labs:  CBC Full  -  ( 19 Dec 2022 11:00 )  WBC Count : 13.83 K/uL  RBC Count : 4.62 M/uL  Hemoglobin : 10.4 g/dL  Hematocrit : 34.2 %  Platelet Count - Automated : 440 K/uL  Mean Cell Volume : 74.0 fL  Mean Cell Hemoglobin : 22.5 pg  Mean Cell Hemoglobin Concentration : 30.4 gm/dL  Auto Neutrophil # : x  Auto Lymphocyte # : x  Auto Monocyte # : x  Auto Eosinophil # : x  Auto Basophil # : x  Auto Neutrophil % : x  Auto Lymphocyte % : x  Auto Monocyte % : x  Auto Eosinophil % : x  Auto Basophil % : x    12-19    136  |  103  |  13  ----------------------------<  114<H>  4.1   |  22  |  0.64    Ca    8.9      19 Dec 2022 11:00  Mg     2.00     12-19    TPro  8.1  /  Alb  3.9  /  TBili  0.4  /  DBili  x   /  AST  18  /  ALT  9   /  AlkPhos  83  12-19              Imaging:

## 2022-12-20 NOTE — DIETITIAN INITIAL EVALUATION ADULT - ADD RECOMMEND
Consider add Multivitamin w/ minerals, Vitamin D3, and B12 when medically appropriate. Monitor PO intake/tolerance, weights, labs, BM's, and skin integrity. Encourage po intake as tolerated and honor food preferences PRN.

## 2022-12-20 NOTE — BH INPATIENT PSYCHIATRY PROGRESS NOTE - NSBHMETABOLIC_PSY_ALL_CORE_FT
BMI:   HbA1c:   Glucose: POCT Blood Glucose.: 145 mg/dL (12-19-22 @ 08:32)    BP: 114/80 (12-19-22 @ 07:40) (63/39 - 114/80)  Lipid Panel:

## 2022-12-20 NOTE — DIETITIAN INITIAL EVALUATION ADULT - PERTINENT MEDS FT
MEDICATIONS  (STANDING):  carvedilol 6.25 milliGRAM(s) Oral every 12 hours  influenza   Vaccine 0.5 milliLiter(s) IntraMuscular once  montelukast 10 milliGRAM(s) Oral daily  risperiDONE   Tablet 2 milliGRAM(s) Oral at bedtime    MEDICATIONS  (PRN):  albuterol    90 MICROgram(s) HFA Inhaler 2 Puff(s) Inhalation every 6 hours PRN Bronchospasm  diphenhydrAMINE 50 milliGRAM(s) Oral every 6 hours PRN eps  diphenhydrAMINE Injectable 50 milliGRAM(s) IntraMuscular once PRN extreme eps  haloperidol     Tablet 5 milliGRAM(s) Oral Once PRN agitation secondary to psychosis  haloperidol    Injectable 5 milliGRAM(s) IntraMuscular Once PRN extreme agitation secondry to psychosis  hydrOXYzine hydrochloride 50 milliGRAM(s) Oral every 6 hours PRN Anxiety  LORazepam     Tablet 2 milliGRAM(s) Oral every 2 hours PRN CIWA score increase by 2 points and current CIWA score GREATER THAN 9  LORazepam   Injectable 2 milliGRAM(s) IntraMuscular once PRN extreme anxiety  melatonin. 3 milliGRAM(s) Oral at bedtime PRN Insomnia

## 2022-12-20 NOTE — BH INPATIENT PSYCHIATRY PROGRESS NOTE - NSBHASSESSSUMMFT_PSY_ALL_CORE
"Patient is a 52 year old female unemployed lives with . PPH MDD with psychosis per [facility]. Patient has a history of inpatient admission at [facility] after OD 2 years ago. She has 1 past suicide attempt 2 years ago which led to her inpatient admission. She has no history of aggression, violence or legal issues She has no history of drug or alcohol. PMH- migraines, asthma, chf, MARGARETH, s/p gastic bypass & chronic pain s/p right ankle surgery. BIBA  for bizarre behavior    Upon interview.  pt appears without emotional expression. pt denies AH/VH, SI/HI,  pt is compliant with medications denies SE.  pt will try to go to  groups.  pt reporting better sleep and appetite.  pt does appear in distress.  continue with medication regimen and inpatient hospitalization.      1. legal status 9.39  2. routine check no need for constant observation  3.MEDICATIONS  (STANDING):  carvedilol 6.25 milliGRAM(s) Oral every 12 hours  influenza   Vaccine 0.5 vivienne Liter(s) Intramuscular once  montelukast 10 vivienne GRAM(s) Oral daily  Risperidone   Tablet 2 vivienne GRAM(s) Oral at bedtime  albuterol    90 MICRO gram(s) HFA Inhaler 2 Puff(s) Inhalation every 6 hours PRN Bronchospasm  Diphenhydramine 50 vivienne GRAM(s) Oral every 6 hours PRN eps  diphenhydramine Injectable 50 vivienne GRAM(s) Intramuscular once PRN extreme eps  haloperidol     Tablet 5 vivienne GRAM(s) Oral Once PRN agitation secondary to psychosis  haloperidol    Injectable 5 vivienne GRAM(s) Intramuscular Once PRN extreme agitation secondary to psychosis  hydroxyzine hydrochloride 50 vivienne GRAM(s) Oral every 6 hours PRN Anxiety  Lorazepam     Tablet 2 vivienne GRAM(s) Oral every 2 hours PRN CIWA-A score increase by 2 points and current CIWA-A score GREATER THAN 9  Lorazepam   Injectable 2 vivienne GRAM(s) Intramuscular once PRN extreme anxiety  melatonin. 3 vivienne GRAM(s) Oral at bedtime PRN Insomnia  4. ekg- 352/442 ms nsr  5. labs ordered- lipid profile, cbc w/diff, HGBAIC ordered for 12/21  6. individual and group ,milieu therapy  7. discharge patient when clinically  appropriate

## 2022-12-20 NOTE — PHYSICAL THERAPY INITIAL EVALUATION ADULT - LEVEL OF CONSCIOUSNESS, REHAB EVAL
Patient called into clinic to clarify medication clonidine (CATAPRES) 0.3 mg. Patient picked up refill which was submitted incorrectly as patient has been taking qid. Current refill submitted was for bid. Patient extremely upset on phone. Writer RN confirmed patient has upcoming appointment and hasn't been seen since 9/2015 in the office. Patient was supposed to follow up in clinic 12/2015 but no showed. Since then, RX continued to be refilled, but patient was informed he needed appointment for further refills. Writer RN stated that the patient needed to keep upcoming appointment 1/6/2017 and that he currently has enough medication to last him to that time and beyond, at which point, if medication is to be continued a new RX can be submitted. Patient extremely upset, screaming and using profanity and patient terminated phone call.       9/2015 Office Visit   ASSESSMENT/PLAN      1. Coronary artery disease. Cath 9/2012 showed stable findings--see above. Now with recurrent chest pain mainly at rest. Stress test 5/2014 was stable. Cath in 2012 was stable. Can increase LA NTG for sx management to 60mg. If still chest pain, then will proceed with cath at high risk for needing HD after. If chest pain resolves then will proceed with stress testing.     2. Hypertension. Currently controlled. Does not like clonidine but nothing else controls his BP and he has intolerance and cost issues with other agents--discussed this again with pt. Will retry guanfacine again although this caused ED in the past. He is requesting ED therapy but explained that cardiac status needs to be addressed before ED RX provided. He will cut clonidine to 1/2 the dose and slowly wean if BP stable on guanfacine. 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     3. Hyperlipidemia. PMD following labs.     4. CKD. Off HD for the last 2 months. States he was told his kidney function is  \"excellent\". Follows with Dr. Hairston.      5. PH. No RHF. Mild PH on last echo.     6. Dilated sinuses of valsalva. 4.0cm on echo 9/2014.            I acted as scribe for Dr. Paez for this encounter. ANDREA Montoya     Note: I attest that I saw and evaluated the patient and agree with the above documentation as scribed.      Dimas Paez MD  Cardiology     alert

## 2022-12-20 NOTE — BH INPATIENT PSYCHIATRY PROGRESS NOTE - NSBHFUPINTERVALHXFT_PSY_A_CORE
pt seen for f/u for psychosis.  VSS chart reviewed and case discussed with treatment team.  pt is compliant with medications denies SE.  pt denies auditory and visual hallucinations.  pt denies suicidal ideation and homicidal ideation.  pt reported last night,  better sleep.  She reported  good appetite.  pt stating was not herself the other day.  She stated she was delusional.  She reported that she was only sleeping only one two hours  for the past three months.  She reported concerns over her grandson Silus.  She stated she was very emotional, and many things were  running through her head.  patient did not report any delusions, during this interview.  pt urine test abnormal.  discussed with patient and stated  she does not report frequency, urgency, or pain in abdominal area.  She did not report any symptoms of UTI.

## 2022-12-20 NOTE — BH INPATIENT PSYCHIATRY PROGRESS NOTE - NSBHCHARTREVIEWVS_PSY_A_CORE FT
Vital Signs Last 24 Hrs  T(C): 36.6 (12-20-22 @ 07:23), Max: 37.1 (12-19-22 @ 17:05)  T(F): 97.8 (12-20-22 @ 07:23), Max: 98.8 (12-19-22 @ 17:05)  HR: 93 (12-19-22 @ 17:05) (93 - 93)  BP: 138/91 (12-19-22 @ 17:05) (138/91 - 138/91)  BP(mean): --  RR: 18 (12-20-22 @ 07:23) (18 - 18)  SpO2: 99% (12-20-22 @ 07:23) (98% - 99%)    Orthostatic VS  12-20-22 @ 07:23  Lying BP: --/-- HR: --  Sitting BP: 134/80 HR: 95  Standing BP: 117/76 HR: 100  Site: --  Mode: --  Orthostatic VS  12-19-22 @ 20:54  Lying BP: --/-- HR: --  Sitting BP: 126/73 HR: 97  Standing BP: 113/71 HR: 98  Site: --  Mode: --  Orthostatic VS  12-19-22 @ 14:30  Lying BP: 122/76 HR: 105  Sitting BP: 127/79 HR: 112  Standing BP: 121/79 HR: 112  Site: --  Mode: --  Orthostatic VS  12-19-22 @ 10:51  Lying BP: 129/74 HR: 96  Sitting BP: 114/81 HR: 100  Standing BP: 117/79 HR: 130  Site: --  Mode: --  Orthostatic VS  12-19-22 @ 07:10  Lying BP: --/-- HR: --  Sitting BP: 86/44 HR: 94  Standing BP: 63/39 HR: 78  Site: --  Mode: --

## 2022-12-20 NOTE — PHYSICAL THERAPY INITIAL EVALUATION ADULT - PERTINENT HX OF CURRENT PROBLEM, REHAB EVAL
Patient is a 52 year old female unemployed lives with . PPH MDD with psychosis per [facility]ckes [facility] Anxiety. Patient has a history of inpatient admission at [facility] after OD 2 years ago. She has 1 past suicide attempt 2 years ago which led to her inpatient admission. She has no history of aggression, violence or legal issues She has no history of drug or alcohol. PMH- migraines, asthma, chf, MARGARETH, s/p gastic bypass & chronic pain s/p right ankle surgery. BIBA  for bizarre behavior. Patient referred to Physical therapy s/p fall with injury to right elbow.

## 2022-12-21 LAB
A1C WITH ESTIMATED AVERAGE GLUCOSE RESULT: 5 % — SIGNIFICANT CHANGE UP (ref 4–5.6)
BASOPHILS # BLD AUTO: 0.02 K/UL — SIGNIFICANT CHANGE UP (ref 0–0.2)
BASOPHILS NFR BLD AUTO: 0.3 % — SIGNIFICANT CHANGE UP (ref 0–2)
CHOLEST SERPL-MCNC: 135 MG/DL — SIGNIFICANT CHANGE UP
EOSINOPHIL # BLD AUTO: 0.06 K/UL — SIGNIFICANT CHANGE UP (ref 0–0.5)
EOSINOPHIL NFR BLD AUTO: 0.9 % — SIGNIFICANT CHANGE UP (ref 0–6)
ESTIMATED AVERAGE GLUCOSE: 97 — SIGNIFICANT CHANGE UP
HCT VFR BLD CALC: 28.9 % — LOW (ref 34.5–45)
HDLC SERPL-MCNC: 41 MG/DL — LOW
HGB BLD-MCNC: 8.9 G/DL — LOW (ref 11.5–15.5)
IANC: 4 K/UL — SIGNIFICANT CHANGE UP (ref 1.8–7.4)
IMM GRANULOCYTES NFR BLD AUTO: 0.2 % — SIGNIFICANT CHANGE UP (ref 0–0.9)
LIPID PNL WITH DIRECT LDL SERPL: 77 MG/DL — SIGNIFICANT CHANGE UP
LYMPHOCYTES # BLD AUTO: 1.76 K/UL — SIGNIFICANT CHANGE UP (ref 1–3.3)
LYMPHOCYTES # BLD AUTO: 27.6 % — SIGNIFICANT CHANGE UP (ref 13–44)
MCHC RBC-ENTMCNC: 22.4 PG — LOW (ref 27–34)
MCHC RBC-ENTMCNC: 30.8 GM/DL — LOW (ref 32–36)
MCV RBC AUTO: 72.8 FL — LOW (ref 80–100)
MONOCYTES # BLD AUTO: 0.52 K/UL — SIGNIFICANT CHANGE UP (ref 0–0.9)
MONOCYTES NFR BLD AUTO: 8.2 % — SIGNIFICANT CHANGE UP (ref 2–14)
NEUTROPHILS # BLD AUTO: 4 K/UL — SIGNIFICANT CHANGE UP (ref 1.8–7.4)
NEUTROPHILS NFR BLD AUTO: 62.8 % — SIGNIFICANT CHANGE UP (ref 43–77)
NON HDL CHOLESTEROL: 94 MG/DL — SIGNIFICANT CHANGE UP
NRBC # BLD: 0 /100 WBCS — SIGNIFICANT CHANGE UP (ref 0–0)
NRBC # FLD: 0 K/UL — SIGNIFICANT CHANGE UP (ref 0–0)
PLATELET # BLD AUTO: 415 K/UL — HIGH (ref 150–400)
RBC # BLD: 3.97 M/UL — SIGNIFICANT CHANGE UP (ref 3.8–5.2)
RBC # FLD: 16.5 % — HIGH (ref 10.3–14.5)
TRIGL SERPL-MCNC: 83 MG/DL — SIGNIFICANT CHANGE UP
WBC # BLD: 6.37 K/UL — SIGNIFICANT CHANGE UP (ref 3.8–10.5)
WBC # FLD AUTO: 6.37 K/UL — SIGNIFICANT CHANGE UP (ref 3.8–10.5)

## 2022-12-21 PROCEDURE — 99231 SBSQ HOSP IP/OBS SF/LOW 25: CPT

## 2022-12-21 RX ORDER — BENZTROPINE MESYLATE 1 MG
1 TABLET ORAL ONCE
Refills: 0 | Status: COMPLETED | OUTPATIENT
Start: 2022-12-21 | End: 2022-12-21

## 2022-12-21 RX ORDER — RISPERIDONE 4 MG/1
3 TABLET ORAL AT BEDTIME
Refills: 0 | Status: DISCONTINUED | OUTPATIENT
Start: 2022-12-21 | End: 2022-12-28

## 2022-12-21 RX ADMIN — RISPERIDONE 3 MILLIGRAM(S): 4 TABLET ORAL at 20:30

## 2022-12-21 RX ADMIN — CARVEDILOL PHOSPHATE 6.25 MILLIGRAM(S): 80 CAPSULE, EXTENDED RELEASE ORAL at 20:30

## 2022-12-21 RX ADMIN — CARVEDILOL PHOSPHATE 6.25 MILLIGRAM(S): 80 CAPSULE, EXTENDED RELEASE ORAL at 08:10

## 2022-12-21 RX ADMIN — Medication 1 MILLIGRAM(S): at 15:15

## 2022-12-21 RX ADMIN — Medication 3 MILLIGRAM(S): at 21:34

## 2022-12-21 RX ADMIN — MONTELUKAST 10 MILLIGRAM(S): 4 TABLET, CHEWABLE ORAL at 08:10

## 2022-12-21 NOTE — BH INPATIENT PSYCHIATRY PROGRESS NOTE - CURRENT MEDICATION
MEDICATIONS  (STANDING):  carvedilol 6.25 milliGRAM(s) Oral every 12 hours  influenza   Vaccine 0.5 milliLiter(s) IntraMuscular once  montelukast 10 milliGRAM(s) Oral daily  risperiDONE   Tablet 3 milliGRAM(s) Oral at bedtime    MEDICATIONS  (PRN):  albuterol    90 MICROgram(s) HFA Inhaler 2 Puff(s) Inhalation every 6 hours PRN Bronchospasm  diphenhydrAMINE 50 milliGRAM(s) Oral every 6 hours PRN eps  diphenhydrAMINE Injectable 50 milliGRAM(s) IntraMuscular once PRN extreme eps  haloperidol     Tablet 5 milliGRAM(s) Oral Once PRN agitation secondary to psychosis  haloperidol    Injectable 5 milliGRAM(s) IntraMuscular Once PRN extreme agitation secondry to psychosis  hydrOXYzine hydrochloride 50 milliGRAM(s) Oral every 6 hours PRN Anxiety  LORazepam     Tablet 2 milliGRAM(s) Oral every 2 hours PRN CIWA score increase by 2 points and current CIWA score GREATER THAN 9  LORazepam   Injectable 2 milliGRAM(s) IntraMuscular once PRN extreme anxiety  melatonin. 3 milliGRAM(s) Oral at bedtime PRN Insomnia   MEDICATIONS  (STANDING):  benztropine 1 milliGRAM(s) Oral at bedtime  carvedilol 6.25 milliGRAM(s) Oral every 12 hours  influenza   Vaccine 0.5 milliLiter(s) IntraMuscular once  montelukast 10 milliGRAM(s) Oral daily  risperiDONE   Tablet 3 milliGRAM(s) Oral at bedtime    MEDICATIONS  (PRN):  albuterol    90 MICROgram(s) HFA Inhaler 2 Puff(s) Inhalation every 6 hours PRN Bronchospasm  diphenhydrAMINE 50 milliGRAM(s) Oral every 6 hours PRN eps  diphenhydrAMINE Injectable 50 milliGRAM(s) IntraMuscular once PRN extreme eps  haloperidol     Tablet 5 milliGRAM(s) Oral Once PRN agitation secondary to psychosis  haloperidol    Injectable 5 milliGRAM(s) IntraMuscular Once PRN extreme agitation secondry to psychosis  hydrOXYzine hydrochloride 50 milliGRAM(s) Oral every 6 hours PRN Anxiety  LORazepam     Tablet 2 milliGRAM(s) Oral every 2 hours PRN CIWA score increase by 2 points and current CIWA score GREATER THAN 9  LORazepam   Injectable 2 milliGRAM(s) IntraMuscular once PRN extreme anxiety  melatonin. 3 milliGRAM(s) Oral at bedtime PRN Insomnia

## 2022-12-21 NOTE — BH INPATIENT PSYCHIATRY PROGRESS NOTE - NSBHCHARTREVIEWVS_PSY_A_CORE FT
Vital Signs Last 24 Hrs  T(C): 37 (12-21-22 @ 07:39), Max: 37 (12-21-22 @ 07:39)  T(F): 98.6 (12-21-22 @ 07:39), Max: 98.6 (12-21-22 @ 07:39)  HR: --  BP: --  BP(mean): --  RR: 18 (12-21-22 @ 07:39) (18 - 18)  SpO2: 98% (12-21-22 @ 07:39) (96% - 98%)    Orthostatic VS  12-21-22 @ 07:39  Lying BP: --/-- HR: --  Sitting BP: 116/97 HR: 100  Standing BP: 120/80 HR: 100  Site: --  Mode: --  Orthostatic VS  12-20-22 @ 20:43  Lying BP: --/-- HR: --  Sitting BP: 138/81 HR: 87  Standing BP: 125/80 HR: 98  Site: --  Mode: --  Orthostatic VS  12-20-22 @ 07:23  Lying BP: --/-- HR: --  Sitting BP: 134/80 HR: 95  Standing BP: 117/76 HR: 100  Site: --  Mode: --  Orthostatic VS  12-19-22 @ 20:54  Lying BP: --/-- HR: --  Sitting BP: 126/73 HR: 97  Standing BP: 113/71 HR: 98  Site: --  Mode: --   Vital Signs Last 24 Hrs  T(C): 36.9 (12-22-22 @ 07:08), Max: 36.9 (12-22-22 @ 07:08)  T(F): 98.4 (12-22-22 @ 07:08), Max: 98.4 (12-22-22 @ 07:08)  HR: --  BP: 124/79 (12-21-22 @ 20:20) (124/79 - 124/79)  BP(mean): 89 (12-21-22 @ 20:20) (89 - 89)  RR: 19 (12-22-22 @ 07:08) (19 - 19)  SpO2: 100% (12-22-22 @ 07:08) (96% - 100%)    Orthostatic VS  12-22-22 @ 07:08  Lying BP: --/-- HR: --  Sitting BP: 129/81 HR: 85  Standing BP: 118/79 HR: 90  Site: --  Mode: --  Orthostatic VS  12-21-22 @ 07:39  Lying BP: --/-- HR: --  Sitting BP: 116/97 HR: 100  Standing BP: 120/80 HR: 100  Site: --  Mode: --  Orthostatic VS  12-20-22 @ 20:43  Lying BP: --/-- HR: --  Sitting BP: 138/81 HR: 87  Standing BP: 125/80 HR: 98  Site: --  Mode: --

## 2022-12-21 NOTE — BH INPATIENT PSYCHIATRY PROGRESS NOTE - NSBHASSESSSUMMFT_PSY_ALL_CORE
"Patient is a 52 year old female unemployed lives with . PPH MDD with psychosis per Psyckes & Anxiety. Patient has a history of inpatient admission at [facility] after OD 2 years ago. She has 1 past suicide attempt 2 years ago which led to her inpatient admission. She has no history of aggression, violence or legal issues She has no history of drug or alcohol. PMH- migraines, asthma, chf, MARGARETH, s/p gastic bypass & chronic pain s/p right ankle surgery. BIBA  for bizarre behavior    Upon interview.  pt appears without emotional expression. Patient states, she feels very emotional  pt denies AH/VH, SI/HI,  pt is compliant with medications denies SE.  pt will try to go to groups.  pt reporting better sleep and appetite.  pt does appear in distress.  continue with medication regimen and inpatient hospitalization.      1. legal status 9.39  2. routine check no need for constant observation  3.MEDICATIONS  (STANDING):  carvedilol 6.25 milliGRAM(s) Oral every 12 hours  influenza   Vaccine 0.5 vivienne Liter(s) Intramuscular once  montelukast 10 vivienne GRAM(s) Oral daily  Risperidone   Tablet 2 vivienne GRAM(s) Oral at bedtime  albuterol    90 MICRO gram(s) HFA Inhaler 2 Puff(s) Inhalation every 6 hours PRN Bronchospasm  Diphenhydramine 50 vivienne GRAM(s) Oral every 6 hours PRN eps  diphenhydramine Injectable 50 vivienne GRAM(s) Intramuscular once PRN extreme eps  haloperidol     Tablet 5 vivienne GRAM(s) Oral Once PRN agitation secondary to psychosis  haloperidol    Injectable 5 vivienne GRAM(s) Intramuscular Once PRN extreme agitation secondary to psychosis  hydroxyzine hydrochloride 50 vivienne GRAM(s) Oral every 6 hours PRN Anxiety  Lorazepam     Tablet 2 vivienne GRAM(s) Oral every 2 hours PRN CIWA-A score increase by 2 points and current CIWA-A score GREATER THAN 9  Lorazepam   Injectable 2 vivienne GRAM(s) Intramuscular once PRN extreme anxiety  melatonin. 3 vivienne GRAM(s) Oral at bedtime PRN Insomnia  4. ekg- 352/442 ms nsr  5. labs ordered- lipid profile, cbc w/diff small decrease. HGBAIC 5.0, lipid profile wnl  6. individual and group ,milieu therapy  7. discharge patient when clinically  appropriate   "Patient is a 52 year old female unemployed lives with . PPH MDD with psychosis per Psyckes & Anxiety. Patient has a history of inpatient admission at [facility] after OD 2 years ago. She has 1 past suicide attempt 2 years ago which led to her inpatient admission. She has no history of aggression, violence or legal issues She has no history of drug or alcohol. PMH- migraines, asthma, chf, MARGARETH, s/p gastic bypass & chronic pain s/p right ankle surgery. BIBA  for bizarre behavior    Upon interview.  pt appears without emotional expression. Patient states, she feels very emotional  pt denies AH/VH, SI/HI,  pt is compliant with medications denies SE.  pt will try to go to groups.  pt reporting better sleep and appetite.  pt does appear in distress.  continue with medication regimen and inpatient hospitalization.      1. legal status 9.39  2. routine check no need for constant observation  3.MEDICATIONS  (STANDING):  carvedilol 6.25 milliGRAM(s) Oral every 12 hours  influenza   Vaccine 0.5 vivienne Liter(s) Intramuscular once  montelukast 10 vivienne GRAM(s) Oral daily  Risperidone   Tablet 2 vivienne GRAM(s) Oral at bedtime  albuterol    90 MICRO gram(s) HFA Inhaler 2 Puff(s) Inhalation every 6 hours PRN Bronchospasm  Diphenhydramine 50 vivienne GRAM(s) Oral every 6 hours PRN eps  diphenhydramine Injectable 50 vivienne GRAM(s) Intramuscular once PRN extreme eps  haloperidol     Tablet 5 vivienne GRAM(s) Oral Once PRN agitation secondary to psychosis  haloperidol    Injectable 5 vivienne GRAM(s) Intramuscular Once PRN extreme agitation secondary to psychosis  hydroxyzine hydrochloride 50 vivienne GRAM(s) Oral every 6 hours PRN Anxiety  Lorazepam     Tablet 2 vivienne GRAM(s) Oral every 2 hours PRN CIWA-A score increase by 2 points and current CIWA-A score GREATER THAN 9  Lorazepam   Injectable 2 vivienne GRAM(s) Intramuscular once PRN extreme anxiety  melatonin. 3 vivienne GRAM(s) Oral at bedtime PRN Insomnia  4. ekg- 352/442 ms nsr  5. labs ordered- lipid profile, cbc w/diff small decrease. spoke with Dr. Almonte, no clinical changes needed HGBAIC 5.0, lipid profile wnl  6. individual and group ,milieu therapy  7. discharge patient when clinically  appropriate

## 2022-12-21 NOTE — BH INPATIENT PSYCHIATRY PROGRESS NOTE - NSBHFUPINTERVALHXFT_PSY_A_CORE
pt seen for f/u for psychosis.  VSS chart reviewed and case discussed with treatment team.  pt is compliant with medications  denies SE.  pt denies SI/HI, AH/VH.  pt reporting to go to groups.  Patient stating, " Im feeling very emotional".  she did   report good sleep and appetite.  patient stating, she feels as though she is going to cry.  Patient without emotional expression.  patient minimally engaged. pt seen for f/u for psychosis.  VSS chart reviewed and case discussed with treatment team.  pt is compliant with medications  denies SE.  pt denies SI/HI, AH/VH.  pt reporting to go to groups.  Patient stating, " Im feeling very emotional".  she did   report good sleep and appetite.  patient stating, she feels as though she is going to cry.  Patient without emotional expression.  patient minimally engaged.    collateral: spoke with  regarding patient.  He stated she has been noncompliant with medication.  Patient has done well when  she is consistently taking medications.  Discussed with patient is a little better from admission.   ask to reach out to psychiatrist  regarding medications.

## 2022-12-21 NOTE — BH INPATIENT PSYCHIATRY PROGRESS NOTE - NSBHMETABOLIC_PSY_ALL_CORE_FT
BMI:   HbA1c: A1C with Estimated Average Glucose Result: 5.0 % (12-21-22 @ 08:00)    Glucose: POCT Blood Glucose.: 145 mg/dL (12-19-22 @ 08:32)    BP: 114/80 (12-19-22 @ 07:40) (63/39 - 114/80)  Lipid Panel: Date/Time: 12-21-22 @ 08:00  Cholesterol, Serum: 135  Direct LDL: --  HDL Cholesterol, Serum: 41  Total Cholesterol/HDL Ration Measurement: --  Triglycerides, Serum: 83   BMI:   HbA1c: A1C with Estimated Average Glucose Result: 5.0 % (12-21-22 @ 08:00)    Glucose: POCT Blood Glucose.: 145 mg/dL (12-19-22 @ 08:32)    BP: 124/79 (12-21-22 @ 20:20) (124/79 - 124/79)  Lipid Panel: Date/Time: 12-21-22 @ 08:00  Cholesterol, Serum: 135  Direct LDL: --  HDL Cholesterol, Serum: 41  Total Cholesterol/HDL Ration Measurement: --  Triglycerides, Serum: 83

## 2022-12-22 PROCEDURE — 99231 SBSQ HOSP IP/OBS SF/LOW 25: CPT

## 2022-12-22 RX ORDER — BENZTROPINE MESYLATE 1 MG
1 TABLET ORAL AT BEDTIME
Refills: 0 | Status: DISCONTINUED | OUTPATIENT
Start: 2022-12-22 | End: 2022-12-27

## 2022-12-22 RX ADMIN — CARVEDILOL PHOSPHATE 6.25 MILLIGRAM(S): 80 CAPSULE, EXTENDED RELEASE ORAL at 09:03

## 2022-12-22 RX ADMIN — Medication 3 MILLIGRAM(S): at 20:55

## 2022-12-22 RX ADMIN — CARVEDILOL PHOSPHATE 6.25 MILLIGRAM(S): 80 CAPSULE, EXTENDED RELEASE ORAL at 20:55

## 2022-12-22 RX ADMIN — RISPERIDONE 3 MILLIGRAM(S): 4 TABLET ORAL at 20:55

## 2022-12-22 RX ADMIN — MONTELUKAST 10 MILLIGRAM(S): 4 TABLET, CHEWABLE ORAL at 09:04

## 2022-12-22 RX ADMIN — Medication 1 MILLIGRAM(S): at 20:55

## 2022-12-22 NOTE — BH INPATIENT PSYCHIATRY PROGRESS NOTE - NSBHFUPINTERVALHXFT_PSY_A_CORE
pt seen for f/u for psychosis.  VSS chart reviewed and case discussed with treatment team.  no overt events overnight.   pt compliant   with medications denies SE.  pt denies auditory and visual hallucinations.  pt denies suicidal and homicidal ideation.   patient stated  she had went to one group.  patient appears a little brighter today. patient reporting medications are helping

## 2022-12-22 NOTE — BH INPATIENT PSYCHIATRY PROGRESS NOTE - NSBHCHARTREVIEWVS_PSY_A_CORE FT
Vital Signs Last 24 Hrs  T(C): 36.9 (12-22-22 @ 07:08), Max: 36.9 (12-22-22 @ 07:08)  T(F): 98.4 (12-22-22 @ 07:08), Max: 98.4 (12-22-22 @ 07:08)  HR: --  BP: 124/79 (12-21-22 @ 20:20) (124/79 - 124/79)  BP(mean): 89 (12-21-22 @ 20:20) (89 - 89)  RR: 19 (12-22-22 @ 07:08) (19 - 19)  SpO2: 100% (12-22-22 @ 07:08) (96% - 100%)    Orthostatic VS  12-22-22 @ 07:08  Lying BP: --/-- HR: --  Sitting BP: 129/81 HR: 85  Standing BP: 118/79 HR: 90  Site: --  Mode: --  Orthostatic VS  12-21-22 @ 07:39  Lying BP: --/-- HR: --  Sitting BP: 116/97 HR: 100  Standing BP: 120/80 HR: 100  Site: --  Mode: --  Orthostatic VS  12-20-22 @ 20:43  Lying BP: --/-- HR: --  Sitting BP: 138/81 HR: 87  Standing BP: 125/80 HR: 98  Site: --  Mode: --   Vital Signs Last 24 Hrs  T(C): 37.2 (12-23-22 @ 07:21), Max: 37.2 (12-23-22 @ 07:21)  T(F): 98.9 (12-23-22 @ 07:21), Max: 98.9 (12-23-22 @ 07:21)  HR: --  BP: --  BP(mean): --  RR: 20 (12-23-22 @ 07:21) (19 - 20)  SpO2: 99% (12-23-22 @ 06:07) (97% - 99%)    Orthostatic VS  12-23-22 @ 07:21  Lying BP: --/-- HR: --  Sitting BP: 111/78 HR: 91  Standing BP: 103/76 HR: 118  Site: --  Mode: --  Orthostatic VS  12-22-22 @ 20:07  Lying BP: --/-- HR: --  Sitting BP: 130/79 HR: 95  Standing BP: 118/77 HR: 116  Site: --  Mode: --  Orthostatic VS  12-22-22 @ 07:08  Lying BP: --/-- HR: --  Sitting BP: 129/81 HR: 85  Standing BP: 118/79 HR: 90  Site: --  Mode: --  Orthostatic VS  12-21-22 @ 07:39  Lying BP: --/-- HR: --  Sitting BP: 116/97 HR: 100  Standing BP: 120/80 HR: 100  Site: --  Mode: --

## 2022-12-22 NOTE — BH INPATIENT PSYCHIATRY PROGRESS NOTE - NSBHASSESSSUMMFT_PSY_ALL_CORE
"Patient is a 52 year old female unemployed lives with . PPH MDD with psychosis per Psyckes & Anxiety. Patient has a history of inpatient admission at [facility] after OD 2 years ago. She has 1 past suicide attempt 2 years ago which led to her inpatient admission. She has no history of aggression, violence or legal issues She has no history of drug or alcohol. PMH- migraines, asthma, chf, MARGARETH, s/p gastic bypass & chronic pain s/p right ankle surgery. BIBA  for bizarre behavior    Upon interview.  pt appears less guarded and little brighter.  Patient feels medication is helping  pt denies AH/VH, SI/HI,  pt is compliant with medications denies SE.  pt will try to go to groups.  pt reporting better sleep and appetite.  pt does appear in distress.  continue with medication regimen and inpatient hospitalization.      1. legal status 9.39  2. routine check no need for constant observation  3.MEDICATIONS  (STANDING):  carvedilol 6.25 milliGRAM(s) Oral every 12 hours  influenza   Vaccine 0.5 vivienne Liter(s) Intramuscular once  montelukast 10 vivienne GRAM(s) Oral daily  continue Risperidone   Tablet 3 vivienne GRAM(s) Oral at bedtime  albuterol    90 MICRO gram(s) HFA Inhaler 2 Puff(s) Inhalation every 6 hours PRN Bronchospasm  Diphenhydramine 50 vivienne GRAM(s) Oral every 6 hours PRN eps  diphenhydramine Injectable 50 vivienne GRAM(s) Intramuscular once PRN extreme eps  haloperidol     Tablet 5 vivienne GRAM(s) Oral Once PRN agitation secondary to psychosis  haloperidol    Injectable 5 vivienne GRAM(s) Intramuscular Once PRN extreme agitation secondary to psychosis  hydroxyzine hydrochloride 50 vivienne GRAM(s) Oral every 6 hours PRN Anxiety  Lorazepam     Tablet 2 vivienne GRAM(s) Oral every 2 hours PRN CIWA-A score increase by 2 points and current CIWA-A score GREATER THAN 9  Lorazepam   Injectable 2 vivienne GRAM(s) Intramuscular once PRN extreme anxiety  melatonin. 3 vivienne GRAM(s) Oral at bedtime PRN Insomnia  4. ekg- 352/442 ms nsr  5. labs ordered- lipid profile, cbc w/diff small decrease. spoke with Dr. Almonte, no clinical changes needed HGBAIC 5.0, lipid profile wnl  6. individual and group ,milieu therapy  7. discharge patient when clinically  appropriate

## 2022-12-22 NOTE — BH INPATIENT PSYCHIATRY PROGRESS NOTE - CURRENT MEDICATION
MEDICATIONS  (STANDING):  benztropine 1 milliGRAM(s) Oral at bedtime  carvedilol 6.25 milliGRAM(s) Oral every 12 hours  influenza   Vaccine 0.5 milliLiter(s) IntraMuscular once  montelukast 10 milliGRAM(s) Oral daily  risperiDONE   Tablet 3 milliGRAM(s) Oral at bedtime    MEDICATIONS  (PRN):  albuterol    90 MICROgram(s) HFA Inhaler 2 Puff(s) Inhalation every 6 hours PRN Bronchospasm  diphenhydrAMINE 50 milliGRAM(s) Oral every 6 hours PRN eps  diphenhydrAMINE Injectable 50 milliGRAM(s) IntraMuscular once PRN extreme eps  haloperidol     Tablet 5 milliGRAM(s) Oral Once PRN agitation secondary to psychosis  haloperidol    Injectable 5 milliGRAM(s) IntraMuscular Once PRN extreme agitation secondry to psychosis  hydrOXYzine hydrochloride 50 milliGRAM(s) Oral every 6 hours PRN Anxiety  LORazepam   Injectable 2 milliGRAM(s) IntraMuscular once PRN extreme anxiety  melatonin. 3 milliGRAM(s) Oral at bedtime PRN Insomnia

## 2022-12-22 NOTE — BH INPATIENT PSYCHIATRY PROGRESS NOTE - NSBHMETABOLIC_PSY_ALL_CORE_FT
BMI:   HbA1c: A1C with Estimated Average Glucose Result: 5.0 % (12-21-22 @ 08:00)    Glucose: POCT Blood Glucose.: 145 mg/dL (12-19-22 @ 08:32)    BP: 124/79 (12-21-22 @ 20:20) (124/79 - 124/79)  Lipid Panel: Date/Time: 12-21-22 @ 08:00  Cholesterol, Serum: 135  Direct LDL: --  HDL Cholesterol, Serum: 41  Total Cholesterol/HDL Ration Measurement: --  Triglycerides, Serum: 83

## 2022-12-23 PROCEDURE — 99231 SBSQ HOSP IP/OBS SF/LOW 25: CPT

## 2022-12-23 RX ORDER — ACETAMINOPHEN 500 MG
650 TABLET ORAL EVERY 6 HOURS
Refills: 0 | Status: DISCONTINUED | OUTPATIENT
Start: 2022-12-23 | End: 2023-01-04

## 2022-12-23 RX ADMIN — RISPERIDONE 3 MILLIGRAM(S): 4 TABLET ORAL at 21:15

## 2022-12-23 RX ADMIN — Medication 650 MILLIGRAM(S): at 16:48

## 2022-12-23 RX ADMIN — Medication 3 MILLIGRAM(S): at 21:52

## 2022-12-23 RX ADMIN — Medication 1 MILLIGRAM(S): at 21:15

## 2022-12-23 RX ADMIN — MONTELUKAST 10 MILLIGRAM(S): 4 TABLET, CHEWABLE ORAL at 09:47

## 2022-12-23 RX ADMIN — CARVEDILOL PHOSPHATE 6.25 MILLIGRAM(S): 80 CAPSULE, EXTENDED RELEASE ORAL at 21:15

## 2022-12-23 RX ADMIN — CARVEDILOL PHOSPHATE 6.25 MILLIGRAM(S): 80 CAPSULE, EXTENDED RELEASE ORAL at 09:47

## 2022-12-23 RX ADMIN — Medication 650 MILLIGRAM(S): at 17:40

## 2022-12-23 NOTE — BH INPATIENT PSYCHIATRY PROGRESS NOTE - NSBHMETABOLIC_PSY_ALL_CORE_FT
BMI:   HbA1c: A1C with Estimated Average Glucose Result: 5.0 % (12-21-22 @ 08:00)    Glucose: POCT Blood Glucose.: 145 mg/dL (12-19-22 @ 08:32)    BP: 116/73 (12-23-22 @ 09:32) (116/73 - 124/79)  Lipid Panel: Date/Time: 12-21-22 @ 08:00  Cholesterol, Serum: 135  Direct LDL: --  HDL Cholesterol, Serum: 41  Total Cholesterol/HDL Ration Measurement: --  Triglycerides, Serum: 83

## 2022-12-23 NOTE — BH INPATIENT PSYCHIATRY PROGRESS NOTE - NSBHCHARTREVIEWVS_PSY_A_CORE FT
Vital Signs Last 24 Hrs  T(C): 37.2 (12-23-22 @ 07:21), Max: 37.2 (12-23-22 @ 07:21)  T(F): 98.9 (12-23-22 @ 07:21), Max: 98.9 (12-23-22 @ 07:21)  HR: 101 (12-23-22 @ 09:32) (101 - 101)  BP: 116/73 (12-23-22 @ 09:32) (116/73 - 116/73)  BP(mean): --  RR: 20 (12-23-22 @ 07:21) (19 - 20)  SpO2: 99% (12-23-22 @ 06:07) (97% - 99%)    Orthostatic VS  12-23-22 @ 07:21  Lying BP: --/-- HR: --  Sitting BP: 111/78 HR: 91  Standing BP: 103/76 HR: 118  Site: --  Mode: --  Orthostatic VS  12-22-22 @ 20:07  Lying BP: --/-- HR: --  Sitting BP: 130/79 HR: 95  Standing BP: 118/77 HR: 116  Site: --  Mode: --  Orthostatic VS  12-22-22 @ 07:08  Lying BP: --/-- HR: --  Sitting BP: 129/81 HR: 85  Standing BP: 118/79 HR: 90  Site: --  Mode: --

## 2022-12-23 NOTE — BH INPATIENT PSYCHIATRY PROGRESS NOTE - NSBHASSESSSUMMFT_PSY_ALL_CORE
"Patient is a 52 year old female unemployed lives with . PPH MDD with psychosis per [facility] Anxiety. Patient has a history of inpatient admission at [facility] after OD 2 years ago. She has 1 past suicide attempt 2 years ago which led to her inpatient admission. She has no history of aggression, violence or legal issues She has no history of drug or alcohol. PMH- migraines, asthma, chf, MARGARETH, s/p gastic bypass & chronic pain s/p right ankle surgery. BIBA  for bizarre behavior    Upon interview.  pt appears less guarded and little brighter today.  Patient feels medication is helping  pt denies AH/VH, SI/HI,  pt is compliant with medications denies SE.  pt reporting had went to groups.  pt reporting better sleep and appetite.  continue with medication regimen and inpatient hospitalization.      1. legal status 9.39  2. routine check no need for constant observation  3.MEDICATIONS  (STANDING):  carvedilol 6.25 milliGRAM(s) Oral every 12 hours  influenza   Vaccine 0.5 vivienne Liter(s) Intramuscular once  montelukast 10 vivienne GRAM(s) Oral daily  continue Risperidone   Tablet 3 vivienne GRAM(s) Oral at bedtime  albuterol    90 MICRO gram(s) HFA Inhaler 2 Puff(s) Inhalation every 6 hours PRN Bronchospasm  Diphenhydramine 50 vivienne GRAM(s) Oral every 6 hours PRN eps  diphenhydramine Injectable 50 vivienne GRAM(s) Intramuscular once PRN extreme eps  haloperidol     Tablet 5 vivienne GRAM(s) Oral Once PRN agitation secondary to psychosis  haloperidol    Injectable 5 vivienne GRAM(s) Intramuscular Once PRN extreme agitation secondary to psychosis  hydroxyzine hydrochloride 50 vivienne GRAM(s) Oral every 6 hours PRN Anxiety  Lorazepam     Tablet 2 vivienne GRAM(s) Oral every 2 hours PRN CIWA-A score increase by 2 points and current CIWA-A score GREATER THAN 9  Lorazepam   Injectable 2 vivienne GRAM(s) Intramuscular once PRN extreme anxiety  melatonin. 3 vivienne GRAM(s) Oral at bedtime PRN Insomnia  4. ekg- 352/442 ms nsr  5. labs ordered- lipid profile, cbc w/diff small decrease. spoke with Dr. Almonte, no clinical changes needed HGBAIC 5.0, lipid profile wnl  6. individual and group ,milieu therapy  7. discharge patient when clinically  appropriate   "Patient is a 52 year old female unemployed lives with . PPH MDD with psychosis per Psyckes & Anxiety. Patient has a history of inpatient admission at [facility] after OD 2 years ago. She has 1 past suicide attempt 2 years ago which led to her inpatient admission. She has no history of aggression, violence or legal issues She has no history of drug or alcohol. PMH- migraines, asthma, chf, MARGARETH, s/p gastic bypass & chronic pain s/p right ankle surgery. BIBA  for bizarre behavior    Upon interview.  pt appears less guarded and little brighter today.  Patient feels medication is helping  pt denies AH/VH, SI/HI,  pt is compliant with medications denies SE.  pt reporting had went to groups.  pt reporting better sleep and appetite.  continue with medication regimen and inpatient hospitalization.      1. legal status 9.39  2. routine check no need for constant observation  3.MEDICATIONS  (STANDING):  carvedilol 6.25 milliGRAM(s) Oral every 12 hours  influenza   Vaccine 0.5 vivienne Liter(s) Intramuscular once  montelukast 10 vivienne GRAM(s) Oral daily  continue Risperidone   Tablet 3 vivienne GRAM(s) Oral at bedtime. considering RAMÍREZ  albuterol    90 MICRO gram(s) HFA Inhaler 2 Puff(s) Inhalation every 6 hours PRN Bronchospasm  Diphenhydramine 50 vivienne GRAM(s) Oral every 6 hours PRN eps  diphenhydramine Injectable 50 vivienne GRAM(s) Intramuscular once PRN extreme eps  haloperidol     Tablet 5 vivienne GRAM(s) Oral Once PRN agitation secondary to psychosis  haloperidol    Injectable 5 vivienne GRAM(s) Intramuscular Once PRN extreme agitation secondary to psychosis  hydroxyzine hydrochloride 50 vivienne GRAM(s) Oral every 6 hours PRN Anxiety  Lorazepam     Tablet 2 vivienne GRAM(s) Oral every 2 hours PRN CIWA-A score increase by 2 points and current CIWA-A score GREATER THAN 9  Lorazepam   Injectable 2 vivienne GRAM(s) Intramuscular once PRN extreme anxiety  melatonin. 3 vivienne GRAM(s) Oral at bedtime PRN Insomnia  4. ekg- 352/442 ms nsr  5. labs ordered- lipid profile, cbc w/diff small decrease. spoke with Dr. Ramírez, no clinical changes needed HGBAIC 5.0, lipid profile wnl  6. individual and group ,milieu therapy  7. discharge patient when clinically  appropriate

## 2022-12-23 NOTE — BH INPATIENT PSYCHIATRY PROGRESS NOTE - NSBHFUPINTERVALHXFT_PSY_A_CORE
pt seen for f/u for psychosis.  VSS chart reviewed and case discussed with treatment team.  patient is compliant with medication denies  SE.  pt denies auditory and visual hallucinations. pt denies suicidal and homicidal ideation.   pt reporting going to groups. patient  reporting sleeping well, with good appetite.  Will try to contact Dr. Renee today

## 2022-12-24 RX ADMIN — Medication 1 MILLIGRAM(S): at 20:18

## 2022-12-24 RX ADMIN — RISPERIDONE 3 MILLIGRAM(S): 4 TABLET ORAL at 20:19

## 2022-12-24 RX ADMIN — CARVEDILOL PHOSPHATE 6.25 MILLIGRAM(S): 80 CAPSULE, EXTENDED RELEASE ORAL at 20:18

## 2022-12-24 RX ADMIN — CARVEDILOL PHOSPHATE 6.25 MILLIGRAM(S): 80 CAPSULE, EXTENDED RELEASE ORAL at 09:41

## 2022-12-24 RX ADMIN — Medication 3 MILLIGRAM(S): at 20:30

## 2022-12-24 RX ADMIN — MONTELUKAST 10 MILLIGRAM(S): 4 TABLET, CHEWABLE ORAL at 09:40

## 2022-12-25 RX ADMIN — CARVEDILOL PHOSPHATE 6.25 MILLIGRAM(S): 80 CAPSULE, EXTENDED RELEASE ORAL at 20:39

## 2022-12-25 RX ADMIN — Medication 3 MILLIGRAM(S): at 20:39

## 2022-12-25 RX ADMIN — RISPERIDONE 3 MILLIGRAM(S): 4 TABLET ORAL at 20:40

## 2022-12-25 RX ADMIN — MONTELUKAST 10 MILLIGRAM(S): 4 TABLET, CHEWABLE ORAL at 08:49

## 2022-12-25 RX ADMIN — Medication 1 MILLIGRAM(S): at 20:39

## 2022-12-26 RX ADMIN — RISPERIDONE 3 MILLIGRAM(S): 4 TABLET ORAL at 21:02

## 2022-12-26 RX ADMIN — MONTELUKAST 10 MILLIGRAM(S): 4 TABLET, CHEWABLE ORAL at 09:42

## 2022-12-26 RX ADMIN — Medication 1 MILLIGRAM(S): at 21:02

## 2022-12-26 RX ADMIN — CARVEDILOL PHOSPHATE 6.25 MILLIGRAM(S): 80 CAPSULE, EXTENDED RELEASE ORAL at 21:02

## 2022-12-26 RX ADMIN — CARVEDILOL PHOSPHATE 6.25 MILLIGRAM(S): 80 CAPSULE, EXTENDED RELEASE ORAL at 09:42

## 2022-12-26 RX ADMIN — Medication 3 MILLIGRAM(S): at 21:42

## 2022-12-26 NOTE — CHART NOTE - NSCHARTNOTEFT_GEN_A_CORE
Patient seen for suture removal. 2 sutures were removed from patient's R elbow. Wound well healed and approximated. No signs of redness, swelling, or discharge from site. Patient tolerated procedure well.

## 2022-12-27 PROCEDURE — 99231 SBSQ HOSP IP/OBS SF/LOW 25: CPT

## 2022-12-27 RX ORDER — BENZTROPINE MESYLATE 1 MG
1 TABLET ORAL AT BEDTIME
Refills: 0 | Status: DISCONTINUED | OUTPATIENT
Start: 2022-12-27 | End: 2023-01-04

## 2022-12-27 RX ORDER — PALIPERIDONE 1.5 MG/1
156 TABLET, EXTENDED RELEASE ORAL
Qty: 1 | Refills: 0
Start: 2022-12-27

## 2022-12-27 RX ORDER — ALBUTEROL 90 UG/1
2 AEROSOL, METERED ORAL
Qty: 0 | Refills: 0 | DISCHARGE
Start: 2022-12-27

## 2022-12-27 RX ORDER — RISPERIDONE 4 MG/1
1 TABLET ORAL
Qty: 0 | Refills: 0 | DISCHARGE
Start: 2022-12-27

## 2022-12-27 RX ORDER — TRAZODONE HCL 50 MG
1 TABLET ORAL
Qty: 0 | Refills: 0 | DISCHARGE
Start: 2022-12-27

## 2022-12-27 RX ORDER — TRAZODONE HCL 50 MG
50 TABLET ORAL AT BEDTIME
Refills: 0 | Status: DISCONTINUED | OUTPATIENT
Start: 2022-12-27 | End: 2023-01-04

## 2022-12-27 RX ORDER — MONTELUKAST 4 MG/1
1 TABLET, CHEWABLE ORAL
Qty: 0 | Refills: 0 | DISCHARGE
Start: 2022-12-27

## 2022-12-27 RX ORDER — PALIPERIDONE 1.5 MG/1
156 TABLET, EXTENDED RELEASE ORAL ONCE
Refills: 0 | Status: COMPLETED | OUTPATIENT
Start: 2023-01-03 | End: 2023-01-03

## 2022-12-27 RX ORDER — PALIPERIDONE 1.5 MG/1
234 TABLET, EXTENDED RELEASE ORAL ONCE
Refills: 0 | Status: COMPLETED | OUTPATIENT
Start: 2022-12-27 | End: 2022-12-27

## 2022-12-27 RX ORDER — CARVEDILOL PHOSPHATE 80 MG/1
1 CAPSULE, EXTENDED RELEASE ORAL
Qty: 0 | Refills: 0 | DISCHARGE
Start: 2022-12-27

## 2022-12-27 RX ORDER — BENZTROPINE MESYLATE 1 MG
1 TABLET ORAL
Qty: 0 | Refills: 0 | DISCHARGE
Start: 2022-12-27

## 2022-12-27 RX ORDER — PALIPERIDONE 1.5 MG/1
117 TABLET, EXTENDED RELEASE ORAL
Qty: 1 | Refills: 0
Start: 2022-12-27

## 2022-12-27 RX ADMIN — CARVEDILOL PHOSPHATE 6.25 MILLIGRAM(S): 80 CAPSULE, EXTENDED RELEASE ORAL at 08:44

## 2022-12-27 RX ADMIN — Medication 3 MILLIGRAM(S): at 20:31

## 2022-12-27 RX ADMIN — MONTELUKAST 10 MILLIGRAM(S): 4 TABLET, CHEWABLE ORAL at 08:45

## 2022-12-27 RX ADMIN — RISPERIDONE 3 MILLIGRAM(S): 4 TABLET ORAL at 20:31

## 2022-12-27 RX ADMIN — Medication 50 MILLIGRAM(S): at 20:32

## 2022-12-27 RX ADMIN — CARVEDILOL PHOSPHATE 6.25 MILLIGRAM(S): 80 CAPSULE, EXTENDED RELEASE ORAL at 20:31

## 2022-12-27 RX ADMIN — PALIPERIDONE 234 MILLIGRAM(S): 1.5 TABLET, EXTENDED RELEASE ORAL at 17:03

## 2022-12-27 NOTE — BH INPATIENT PSYCHIATRY PROGRESS NOTE - NSBHFUPINTERVALHXFT_PSY_A_CORE
pt seen for f/u for psychosis.  VSS chart reviewed and case discussed with treatment team.  pt is compliant with medications denies SE.  pt denies auditory and visual hallucinations.  pt denies suicidal and homicidal ideation. pt engaged in groups and participates.  patient  has not had behavioral issues on the unit.  pt reporting " I am social".    discussed with patient about starting RAMÍREZ.  pt verbalized agreement  and would like to start today.

## 2022-12-27 NOTE — BH INPATIENT PSYCHIATRY PROGRESS NOTE - CURRENT MEDICATION
MEDICATIONS  (STANDING):  carvedilol 6.25 milliGRAM(s) Oral every 12 hours  influenza   Vaccine 0.5 milliLiter(s) IntraMuscular once  montelukast 10 milliGRAM(s) Oral daily  paliperidone Injectable, Long Acting 234 milliGRAM(s) IntraMuscular once  risperiDONE   Tablet 3 milliGRAM(s) Oral at bedtime  traZODone 50 milliGRAM(s) Oral at bedtime    MEDICATIONS  (PRN):  acetaminophen     Tablet .. 650 milliGRAM(s) Oral every 6 hours PRN Moderate Pain (4 - 6)  albuterol    90 MICROgram(s) HFA Inhaler 2 Puff(s) Inhalation every 6 hours PRN Bronchospasm  benztropine 1 milliGRAM(s) Oral at bedtime PRN eps  diphenhydrAMINE 50 milliGRAM(s) Oral every 6 hours PRN eps  diphenhydrAMINE Injectable 50 milliGRAM(s) IntraMuscular once PRN extreme eps  haloperidol     Tablet 5 milliGRAM(s) Oral Once PRN agitation secondary to psychosis  haloperidol    Injectable 5 milliGRAM(s) IntraMuscular Once PRN extreme agitation secondry to psychosis  hydrOXYzine hydrochloride 50 milliGRAM(s) Oral every 6 hours PRN Anxiety  LORazepam   Injectable 2 milliGRAM(s) IntraMuscular once PRN extreme anxiety  melatonin. 3 milliGRAM(s) Oral at bedtime PRN Insomnia

## 2022-12-27 NOTE — BH INPATIENT PSYCHIATRY PROGRESS NOTE - NSBHASSESSSUMMFT_PSY_ALL_CORE
"Patient is a 52 year old female unemployed lives with . PPH MDD with psychosis per [facility]yckes & Anxiety. Patient has a history of inpatient admission at [facility] after OD 2 years ago. She has 1 past suicide attempt 2 years ago which led to her inpatient admission. She has no history of aggression, violence or legal issues She has no history of drug or alcohol. PMH- migraines, asthma, chf, MARGARETH, s/p gastic bypass & chronic pain s/p right ankle surgery. BIBA  for bizarre behavior    Upon interview.  pt is calm and cooperative.  Discussed with patient about starting RAMÍREZ.  pt verbalized agreement. pt denies AH/VH, SI/HI,  pt is compliant with medications denies SE.  pt reporting had went to groups.  pt reporting better sleep and appetite.  continue with medication regimen and inpatient hospitalization.      1. legal status 9.39  2. routine check no need for constant observation  3.MEDICATIONS  (STANDING):  carvedilol 6.25 milliGRAM(s) Oral every 12 hours  influenza   Vaccine 0.5 vivienne Liter(s) Intramuscular once  montelukast 10 vivienne GRAM(s) Oral daily  decrease Risperidone   Tablet 2 vivienne GRAM(s) Oral at bedtime. considering RAMÍREZ  albuterol    90 MICRO gram(s) HFA Inhaler 2 Puff(s) Inhalation every 6 hours PRN Bronchospasm  Diphenhydramine 50 vivienne GRAM(s) Oral every 6 hours PRN eps  diphenhydramine Injectable 50 vivienne GRAM(s) Intramuscular once PRN extreme eps  haloperidol     Tablet 5 vivienne GRAM(s) Oral Once PRN agitation secondary to psychosis  haloperidol    Injectable 5 vivienne GRAM(s) Intramuscular Once PRN extreme agitation secondary to psychosis  hydroxyzine hydrochloride 50 vivienne GRAM(s) Oral every 6 hours PRN Anxiety  Lorazepam     Tablet 2 vivienne GRAM(s) Oral every 2 hours PRN CIWA-A score increase by 2 points and current CIWA-A score GREATER THAN 9  Lorazepam   Injectable 2 vivienne GRAM(s) Intramuscular once PRN extreme anxiety  melatonin. 3 vivienne GRAM(s) Oral at bedtime PRN Insomnia  4. ekg- 352/442 ms nsr  5. labs ordered- lipid profile, cbc w/diff small decrease. spoke with Dr. Ramírez, no clinical changes needed HGBAIC 5.0, lipid profile wnl  6. individual and group ,milieu therapy  7. discharge patient when clinically  appropriate  8. Invega Sustenna 234 IM X1 one dose given today

## 2022-12-27 NOTE — BH INPATIENT PSYCHIATRY PROGRESS NOTE - NSBHCHARTREVIEWVS_PSY_A_CORE FT
Vital Signs Last 24 Hrs  T(C): 36.6 (12-27-22 @ 07:43), Max: 36.6 (12-26-22 @ 17:40)  T(F): 97.8 (12-27-22 @ 07:43), Max: 97.9 (12-26-22 @ 20:28)  HR: --  BP: --  BP(mean): --  RR: 19 (12-27-22 @ 07:43) (18 - 19)  SpO2: 98% (12-27-22 @ 06:29) (98% - 98%)    Orthostatic VS  12-27-22 @ 07:43  Lying BP: --/-- HR: --  Sitting BP: 121/77 HR: 80  Standing BP: 118/74 HR: 77  Site: --  Mode: --  Orthostatic VS  12-26-22 @ 20:28  Lying BP: --/-- HR: --  Sitting BP: 112/69 HR: 91  Standing BP: 102/72 HR: 109  Site: --  Mode: --  Orthostatic VS  12-26-22 @ 10:15  Lying BP: --/-- HR: --  Sitting BP: 114/75 HR: 85  Standing BP: 109/63 HR: 133  Site: --  Mode: --  Orthostatic VS  12-26-22 @ 07:40  Lying BP: --/-- HR: --  Sitting BP: 117/66 HR: 94  Standing BP: 109/80 HR: --  Site: --  Mode: --

## 2022-12-27 NOTE — BH INPATIENT PSYCHIATRY PROGRESS NOTE - NSBHMETABOLIC_PSY_ALL_CORE_FT
BMI:   HbA1c: A1C with Estimated Average Glucose Result: 5.0 % (12-21-22 @ 08:00)    Glucose: POCT Blood Glucose.: 145 mg/dL (12-19-22 @ 08:32)    BP: 120/71 (12-25-22 @ 20:02) (120/71 - 120/71)  Lipid Panel: Date/Time: 12-21-22 @ 08:00  Cholesterol, Serum: 135  Direct LDL: --  HDL Cholesterol, Serum: 41  Total Cholesterol/HDL Ration Measurement: --  Triglycerides, Serum: 83

## 2022-12-28 PROCEDURE — 99231 SBSQ HOSP IP/OBS SF/LOW 25: CPT

## 2022-12-28 RX ORDER — RISPERIDONE 4 MG/1
2 TABLET ORAL AT BEDTIME
Refills: 0 | Status: DISCONTINUED | OUTPATIENT
Start: 2022-12-28 | End: 2023-01-03

## 2022-12-28 RX ADMIN — Medication 650 MILLIGRAM(S): at 15:03

## 2022-12-28 RX ADMIN — MONTELUKAST 10 MILLIGRAM(S): 4 TABLET, CHEWABLE ORAL at 08:55

## 2022-12-28 RX ADMIN — CARVEDILOL PHOSPHATE 6.25 MILLIGRAM(S): 80 CAPSULE, EXTENDED RELEASE ORAL at 08:55

## 2022-12-28 RX ADMIN — RISPERIDONE 2 MILLIGRAM(S): 4 TABLET ORAL at 20:15

## 2022-12-28 RX ADMIN — Medication 50 MILLIGRAM(S): at 20:16

## 2022-12-28 NOTE — BH INPATIENT PSYCHIATRY PROGRESS NOTE - CURRENT MEDICATION
MEDICATIONS  (STANDING):  carvedilol 6.25 milliGRAM(s) Oral every 12 hours  influenza   Vaccine 0.5 milliLiter(s) IntraMuscular once  montelukast 10 milliGRAM(s) Oral daily  risperiDONE   Tablet 2 milliGRAM(s) Oral at bedtime  traZODone 50 milliGRAM(s) Oral at bedtime    MEDICATIONS  (PRN):  acetaminophen     Tablet .. 650 milliGRAM(s) Oral every 6 hours PRN Moderate Pain (4 - 6)  albuterol    90 MICROgram(s) HFA Inhaler 2 Puff(s) Inhalation every 6 hours PRN Bronchospasm  benztropine 1 milliGRAM(s) Oral at bedtime PRN eps  diphenhydrAMINE 50 milliGRAM(s) Oral every 6 hours PRN eps  diphenhydrAMINE Injectable 50 milliGRAM(s) IntraMuscular once PRN extreme eps  haloperidol     Tablet 5 milliGRAM(s) Oral Once PRN agitation secondary to psychosis  haloperidol    Injectable 5 milliGRAM(s) IntraMuscular Once PRN extreme agitation secondry to psychosis  hydrOXYzine hydrochloride 50 milliGRAM(s) Oral every 6 hours PRN Anxiety  LORazepam   Injectable 2 milliGRAM(s) IntraMuscular once PRN extreme anxiety  melatonin. 3 milliGRAM(s) Oral at bedtime PRN Insomnia

## 2022-12-28 NOTE — BH INPATIENT PSYCHIATRY PROGRESS NOTE - NSBHFUPINTERVALHXFT_PSY_A_CORE
pt seen for f/u for psychosis.  chart reviewed and case discussed with treatment team.  pt is compliant with medications denies  SE.  pt denies auditory and visual hallucination. pt denies suicidal and homicidal ideation.  pt reporting engaged in groups and participates.  pt reporting better sleep and appetite.  patient stated, had tolerated RAMÍREZ.  patient appears a little brighter today.  patient stated, her mood  is " good". patient has not had behavioral issues on the unit

## 2022-12-28 NOTE — BH INPATIENT PSYCHIATRY PROGRESS NOTE - NSBHCHARTREVIEWVS_PSY_A_CORE FT
Vital Signs Last 24 Hrs  T(C): 36.7 (12-28-22 @ 07:20), Max: 37.1 (12-27-22 @ 20:28)  T(F): 98 (12-28-22 @ 07:20), Max: 98.8 (12-27-22 @ 20:28)  HR: 94 (12-27-22 @ 20:28) (94 - 94)  BP: 127/83 (12-27-22 @ 20:28) (127/83 - 127/83)  BP(mean): --  RR: 18 (12-28-22 @ 07:20) (18 - 18)  SpO2: 100% (12-28-22 @ 07:20) (97% - 100%)    Orthostatic VS  12-28-22 @ 07:20  Lying BP: --/-- HR: --  Sitting BP: 119/61 HR: 100  Standing BP: 107/69 HR: 96  Site: --  Mode: --  Orthostatic VS  12-27-22 @ 07:43  Lying BP: --/-- HR: --  Sitting BP: 121/77 HR: 80  Standing BP: 118/74 HR: 77  Site: --  Mode: --  Orthostatic VS  12-26-22 @ 20:28  Lying BP: --/-- HR: --  Sitting BP: 112/69 HR: 91  Standing BP: 102/72 HR: 109  Site: --  Mode: --

## 2022-12-28 NOTE — BH INPATIENT PSYCHIATRY PROGRESS NOTE - NSBHASSESSSUMMFT_PSY_ALL_CORE
"Patient is a 52 year old female unemployed lives with . PPH MDD with psychosis per Psyckes & Anxiety. Patient has a history of inpatient admission at [facility] after OD 2 years ago. She has 1 past suicide attempt 2 years ago which led to her inpatient admission. She has no history of aggression, violence or legal issues She has no history of drug or alcohol. PMH- migraines, asthma, chf, MARGARETH, s/p gastic bypass & chronic pain s/p right ankle surgery. BIBA  for bizarre behavior    Upon interview.  pt is calm and cooperative. Discussed with patient tolerated RAMÍREZ yesterday well.  pt denies AH/VH, SI/HI,  pt is compliant with medications denies SE.  pt reporting had went to groups.  pt reporting better sleep and appetite.  continue with medication regimen and inpatient hospitalization.      1. legal status 9.39  2. routine check no need for constant observation  3.MEDICATIONS  (STANDING):  carvedilol 6.25 milliGRAM(s) Oral every 12 hours  influenza   Vaccine 0.5 vivienne Liter(s) Intramuscular once  montelukast 10 vivienne GRAM(s) Oral daily  decrease Risperidone   Tablet 2 vivienne GRAM(s) Oral at bedtime. considering RAMÍREZ  albuterol    90 MICRO gram(s) HFA Inhaler 2 Puff(s) Inhalation every 6 hours PRN Bronchospasm  Diphenhydramine 50 vivienne GRAM(s) Oral every 6 hours PRN eps  diphenhydramine Injectable 50 vivienne GRAM(s) Intramuscular once PRN extreme eps  haloperidol     Tablet 5 vivienne GRAM(s) Oral Once PRN agitation secondary to psychosis  haloperidol    Injectable 5 vivienne GRAM(s) Intramuscular Once PRN extreme agitation secondary to psychosis  hydroxyzine hydrochloride 50 vivienne GRAM(s) Oral every 6 hours PRN Anxiety  Lorazepam     Tablet 2 vivienne GRAM(s) Oral every 2 hours PRN CIWA-A score increase by 2 points and current CIWA-A score GREATER THAN 9  Lorazepam   Injectable 2 vivienne GRAM(s) Intramuscular once PRN extreme anxiety  melatonin. 3 vivienne GRAM(s) Oral at bedtime PRN Insomnia  4. ekg- 352/442 ms nsr  5. individual and group ,milieu therapy  6. discharge patient when clinically  appropriate  7. Invega Sustenna 156mg on 1/3

## 2022-12-29 PROCEDURE — 99231 SBSQ HOSP IP/OBS SF/LOW 25: CPT

## 2022-12-29 RX ORDER — MULTIVIT-MIN/FERROUS GLUCONATE 9 MG/15 ML
1 LIQUID (ML) ORAL DAILY
Refills: 0 | Status: DISCONTINUED | OUTPATIENT
Start: 2022-12-30 | End: 2023-01-04

## 2022-12-29 RX ADMIN — Medication 50 MILLIGRAM(S): at 21:02

## 2022-12-29 RX ADMIN — MONTELUKAST 10 MILLIGRAM(S): 4 TABLET, CHEWABLE ORAL at 09:18

## 2022-12-29 RX ADMIN — RISPERIDONE 2 MILLIGRAM(S): 4 TABLET ORAL at 21:03

## 2022-12-29 RX ADMIN — CARVEDILOL PHOSPHATE 6.25 MILLIGRAM(S): 80 CAPSULE, EXTENDED RELEASE ORAL at 09:18

## 2022-12-29 NOTE — BH INPATIENT PSYCHIATRY PROGRESS NOTE - NSBHCHARTREVIEWVS_PSY_A_CORE FT
Vital Signs Last 24 Hrs  T(C): 36.6 (12-29-22 @ 07:17), Max: 36.6 (12-28-22 @ 20:30)  T(F): 97.8 (12-29-22 @ 07:17), Max: 97.9 (12-28-22 @ 20:30)  HR: --  BP: 105/69 (12-28-22 @ 22:00) (105/69 - 117/74)  BP(mean): 110 (12-28-22 @ 22:00) (110 - 112)  RR: 19 (12-29-22 @ 07:17) (19 - 19)  SpO2: 99% (12-29-22 @ 07:17) (97% - 99%)    Orthostatic VS  12-29-22 @ 07:17  Lying BP: --/-- HR: --  Sitting BP: 124/71 HR: 89  Standing BP: 118/71 HR: 80  Site: --  Mode: --  Orthostatic VS  12-28-22 @ 07:20  Lying BP: --/-- HR: --  Sitting BP: 119/61 HR: 100  Standing BP: 107/69 HR: 96  Site: --  Mode: --

## 2022-12-29 NOTE — BH PSYCHOLOGY - GROUP THERAPY NOTE - NSPSYCHOLGRPDBTPT_PSY_A_CORE
stable mood and affect in group/patient showing good behavior control/Patient able to identify mood states

## 2022-12-29 NOTE — BH PSYCHOLOGY - GROUP THERAPY NOTE - NSPSYCHOLGRPDBTGOAL_PSY_A_CORE
reduce mood and affective lability/reduce impulsive self-defeating behavior/improve ability to indentify feelings/reduce vulnerability to emotional dysregualation/promote skills to reduce anger

## 2022-12-29 NOTE — BH PSYCHOLOGY - GROUP THERAPY NOTE - NSPSYCHOLGRPDBTEMOT_PSY_A_CORE FT
Patient attended the DBT skills group, and the group focused on the emotion regulation skill of opposite action. The group discussed when to practice opposite action and how to do practice step by step in order to change the emotion. The group also discussed common emotions, action urges, and the opposite actions. The group discussed different examples of emotions, such as fear and sadness, to discuss how to practice opposite action when the emotion is not justified or is acting on the urge is not effective. Group members demonstrated their understanding through active participation and discussion. Worksheets to practice opposite action step by step in a current or recent emotional situation were distributed for patients to work on.

## 2022-12-29 NOTE — BH INPATIENT PSYCHIATRY PROGRESS NOTE - NSBHASSESSSUMMFT_PSY_ALL_CORE
"Patient is a 52 year old female unemployed lives with . PPH MDD with psychosis per Psyckes & Anxiety. Patient has a history of inpatient admission at [facility] after OD 2 years ago. She has 1 past suicide attempt 2 years ago which led to her inpatient admission. She has no history of aggression, violence or legal issues She has no history of drug or alcohol. PMH- migraines, asthma, chf, MARGARETH, s/p gastic bypass & chronic pain s/p right ankle surgery. BIBA  for bizarre behavior    Upon interview.  pt is calm and cooperative.   pt denies AH/VH, SI/HI,  pt is compliant with medications denies SE.  pt reporting had went to groups.  pt reporting better sleep and appetite. pt request multivitamin, will order.   continue with medication regimen and inpatient hospitalization.      1. legal status 9.39  2. routine check no need for constant observation  3.MEDICATIONS  (STANDING):  carvedilol 6.25 milliGRAM(s) Oral every 12 hours  influenza   Vaccine 0.5 vivienne Liter(s) Intramuscular once  montelukast 10 vivienne GRAM(s) Oral daily, multivitamin oral once daily starting on 12/30  decrease Risperidone   Tablet 2 vivienne GRAM(s) Oral at bedtime. considering RAMÍREZ  albuterol    90 MICRO gram(s) HFA Inhaler 2 Puff(s) Inhalation every 6 hours PRN Bronchospasm  Diphenhydramine 50 vivienne GRAM(s) Oral every 6 hours PRN eps  diphenhydramine Injectable 50 vivienne GRAM(s) Intramuscular once PRN extreme eps  haloperidol     Tablet 5 vivienne GRAM(s) Oral Once PRN agitation secondary to psychosis  haloperidol    Injectable 5 vivienne GRAM(s) Intramuscular Once PRN extreme agitation secondary to psychosis  hydroxyzine hydrochloride 50 vivienne GRAM(s) Oral every 6 hours PRN Anxiety  Lorazepam     Tablet 2 vivienne GRAM(s) Oral every 2 hours PRN CIWA-A score increase by 2 points and current CIWA-A score GREATER THAN 9  Lorazepam   Injectable 2 vivienne GRAM(s) Intramuscular once PRN extreme anxiety  melatonin. 3 vivienne GRAM(s) Oral at bedtime PRN Insomnia  4. ekg- 352/442 ms nsr  5. individual and group ,milieu therapy  6. discharge patient when clinically  appropriate  7. Invega Sustenna 156mg on 1/3

## 2022-12-29 NOTE — BH INPATIENT PSYCHIATRY PROGRESS NOTE - NSBHFUPINTERVALHXFT_PSY_A_CORE
pt seen for f/u for psychosis.  VSS chart reviewed and case discussed with treatment team.  pt is compliant with medications  denies SE. pt denies suicidal and homicidal ideation.  pt denies auditory and visual hallucinations.  pt engaged in groups and   participates.  pt reporting good sleep and appetite.

## 2022-12-29 NOTE — BH PSYCHOLOGY - GROUP THERAPY NOTE - NSBHPSYCHOLGRPDUR_PSY_A_CORE
45 minutes Problem: Pain Management  Goal: Pain level will decrease to patient's comfort goal  Outcome: PROGRESSING AS EXPECTED  Pain assessed throughout shift. Pt taught 0-10 pain scale. PRN pain medication available, nonpharmacologic interventions offered.    Problem: Skin Integrity  Goal: Risk for impaired skin integrity will decrease  Outcome: PROGRESSING AS EXPECTED  Skin assessed q shift. Pt encouraged to turn. Pillows in use for support.

## 2022-12-29 NOTE — BH PSYCHOLOGY - GROUP THERAPY NOTE - NSBHPSYCHOLPARTICIPCOMMENT_PSY_A_CORE FT
Patient was attentive, cooperative, and engaged. Patient actively participated and answered the 's questions for the group and discussed her own experiences. Patient provided feedback on mindfulness exercise.

## 2022-12-29 NOTE — BH INPATIENT PSYCHIATRY PROGRESS NOTE - NSBHMETABOLIC_PSY_ALL_CORE_FT
BMI:   HbA1c: A1C with Estimated Average Glucose Result: 5.0 % (12-21-22 @ 08:00)    Glucose: POCT Blood Glucose.: 145 mg/dL (12-19-22 @ 08:32)    BP: 105/69 (12-28-22 @ 22:00) (105/69 - 127/83)  Lipid Panel: Date/Time: 12-21-22 @ 08:00  Cholesterol, Serum: 135  Direct LDL: --  HDL Cholesterol, Serum: 41  Total Cholesterol/HDL Ration Measurement: --  Triglycerides, Serum: 83

## 2022-12-29 NOTE — BH PSYCHOLOGY - GROUP THERAPY NOTE - TOKEN PULL-DIAGNOSIS
Primary Diagnosis:  Psychosis [F29]        Problem Dx:   MDD (major depressive disorder), recurrent, severe, with psychosis [F33.3]      Altered mental status [R41.82]

## 2022-12-30 PROCEDURE — 99231 SBSQ HOSP IP/OBS SF/LOW 25: CPT

## 2022-12-30 RX ADMIN — RISPERIDONE 2 MILLIGRAM(S): 4 TABLET ORAL at 20:46

## 2022-12-30 RX ADMIN — Medication 1 MILLIGRAM(S): at 20:46

## 2022-12-30 RX ADMIN — Medication 1 TABLET(S): at 08:42

## 2022-12-30 RX ADMIN — CARVEDILOL PHOSPHATE 6.25 MILLIGRAM(S): 80 CAPSULE, EXTENDED RELEASE ORAL at 08:42

## 2022-12-30 RX ADMIN — MONTELUKAST 10 MILLIGRAM(S): 4 TABLET, CHEWABLE ORAL at 08:42

## 2022-12-30 RX ADMIN — CARVEDILOL PHOSPHATE 6.25 MILLIGRAM(S): 80 CAPSULE, EXTENDED RELEASE ORAL at 20:47

## 2022-12-30 RX ADMIN — Medication 50 MILLIGRAM(S): at 10:00

## 2022-12-30 RX ADMIN — Medication 50 MILLIGRAM(S): at 20:47

## 2022-12-30 NOTE — BH INPATIENT PSYCHIATRY PROGRESS NOTE - NSBHASSESSSUMMFT_PSY_ALL_CORE
"Patient is a 52 year old female unemployed lives with . PPH MDD with psychosis per Psyckes & Anxiety. Patient has a history of inpatient admission at [facility] after OD 2 years ago. She has 1 past suicide attempt 2 years ago which led to her inpatient admission. She has no history of aggression, violence or legal issues She has no history of drug or alcohol. PMH- migraines, asthma, chf, MARGARETH, s/p gastic bypass & chronic pain s/p right ankle surgery. BIBA  for bizarre behavior    Upon interview.  pt is calm and cooperative.   pt denies AH/VH, SI/HI,  pt is compliant with medications denies SE.  pt reporting had went to groups.  pt had poor  sleep. discussed with patient may take PRN medication for anxiety.  continue with medication regimen and inpatient hospitalization.      1. legal status 9.39  2. routine check no need for constant observation  3.MEDICATIONS  (STANDING):  carvedilol 6.25 milliGRAM(s) Oral every 12 hours  influenza   Vaccine 0.5 vivienne Liter(s) Intramuscular once  montelukast 10 vivienne GRAM(s) Oral daily, multivitamin oral once daily starting on 12/30  decrease Risperidone   Tablet 2 vivienne GRAM(s) Oral at bedtime. considering RAMÍREZ  albuterol    90 MICRO gram(s) HFA Inhaler 2 Puff(s) Inhalation every 6 hours PRN Bronchospasm  Diphenhydramine 50 vivienne GRAM(s) Oral every 6 hours PRN eps  diphenhydramine Injectable 50 vivienne GRAM(s) Intramuscular once PRN extreme eps  haloperidol     Tablet 5 vivienne GRAM(s) Oral Once PRN agitation secondary to psychosis  haloperidol    Injectable 5 vivienne GRAM(s) Intramuscular Once PRN extreme agitation secondary to psychosis  hydroxyzine hydrochloride 50 vivienne GRAM(s) Oral every 6 hours PRN Anxiety  Lorazepam     Tablet 2 vivienne GRAM(s) Oral every 2 hours PRN CIWA-A score increase by 2 points and current CIWA-A score GREATER THAN 9  Lorazepam   Injectable 2 vivienne GRAM(s) Intramuscular once PRN extreme anxiety  melatonin. 3 vivienne GRAM(s) Oral at bedtime PRN Insomnia  4. ekg- 352/442 ms nsr  5. individual and group ,milieu therapy  6. discharge patient when clinically  appropriate  7. Invega Sustenna 156mg on 1/3

## 2022-12-30 NOTE — BH INPATIENT PSYCHIATRY PROGRESS NOTE - CURRENT MEDICATION
MEDICATIONS  (STANDING):  carvedilol 6.25 milliGRAM(s) Oral every 12 hours  influenza   Vaccine 0.5 milliLiter(s) IntraMuscular once  montelukast 10 milliGRAM(s) Oral daily  multivitamin/minerals 1 Tablet(s) Oral daily  risperiDONE   Tablet 2 milliGRAM(s) Oral at bedtime  traZODone 50 milliGRAM(s) Oral at bedtime    MEDICATIONS  (PRN):  acetaminophen     Tablet .. 650 milliGRAM(s) Oral every 6 hours PRN Moderate Pain (4 - 6)  albuterol    90 MICROgram(s) HFA Inhaler 2 Puff(s) Inhalation every 6 hours PRN Bronchospasm  benztropine 1 milliGRAM(s) Oral at bedtime PRN eps  diphenhydrAMINE 50 milliGRAM(s) Oral every 6 hours PRN eps  diphenhydrAMINE Injectable 50 milliGRAM(s) IntraMuscular once PRN extreme eps  haloperidol     Tablet 5 milliGRAM(s) Oral Once PRN agitation secondary to psychosis  haloperidol    Injectable 5 milliGRAM(s) IntraMuscular Once PRN extreme agitation secondry to psychosis  hydrOXYzine hydrochloride 50 milliGRAM(s) Oral every 6 hours PRN Anxiety  LORazepam   Injectable 2 milliGRAM(s) IntraMuscular once PRN extreme anxiety  melatonin. 3 milliGRAM(s) Oral at bedtime PRN Insomnia

## 2022-12-30 NOTE — BH INPATIENT PSYCHIATRY PROGRESS NOTE - NSBHFUPINTERVALHXFT_PSY_A_CORE
pt seen for f/u for psychosis.   VSS chart reviewed and case discussed with treatment team. pt reporting a little anxious because of  not sleeping well.  Discussed with patient if needed may take PRN medication for anxiety.   Otherwise, patient is compliant with  medications denies SE.  pt denies auditory and visual hallucinations.  pt denies suicidal and homicidal ideation.  pt reports good  appetite.  pt reporting going to groups and participates.

## 2022-12-30 NOTE — BH INPATIENT PSYCHIATRY PROGRESS NOTE - NSBHMETABOLIC_PSY_ALL_CORE_FT
BMI:   HbA1c: A1C with Estimated Average Glucose Result: 5.0 % (12-21-22 @ 08:00)    Glucose: POCT Blood Glucose.: 145 mg/dL (12-19-22 @ 08:32)    BP: 110/70 (12-29-22 @ 21:06) (105/69 - 127/83)  Lipid Panel: Date/Time: 12-21-22 @ 08:00  Cholesterol, Serum: 135  Direct LDL: --  HDL Cholesterol, Serum: 41  Total Cholesterol/HDL Ration Measurement: --  Triglycerides, Serum: 83

## 2022-12-30 NOTE — BH INPATIENT PSYCHIATRY PROGRESS NOTE - NSBHCHARTREVIEWVS_PSY_A_CORE FT
Vital Signs Last 24 Hrs  T(C): 36.8 (12-30-22 @ 07:15), Max: 36.8 (12-30-22 @ 07:15)  T(F): 98.3 (12-30-22 @ 07:15), Max: 98.3 (12-30-22 @ 07:15)  HR: --  BP: 110/70 (12-29-22 @ 21:06) (110/70 - 110/70)  BP(mean): --  RR: 19 (12-30-22 @ 07:15) (19 - 19)  SpO2: 99% (12-30-22 @ 07:15) (97% - 99%)    Orthostatic VS  12-30-22 @ 07:15  Lying BP: --/-- HR: --  Sitting BP: 119/87 HR: 111  Standing BP: 116/79 HR: 90  Site: --  Mode: --  Orthostatic VS  12-29-22 @ 07:17  Lying BP: --/-- HR: --  Sitting BP: 124/71 HR: 89  Standing BP: 118/71 HR: 80  Site: --  Mode: --

## 2022-12-31 RX ADMIN — MONTELUKAST 10 MILLIGRAM(S): 4 TABLET, CHEWABLE ORAL at 09:04

## 2022-12-31 RX ADMIN — RISPERIDONE 2 MILLIGRAM(S): 4 TABLET ORAL at 21:23

## 2022-12-31 RX ADMIN — CARVEDILOL PHOSPHATE 6.25 MILLIGRAM(S): 80 CAPSULE, EXTENDED RELEASE ORAL at 21:24

## 2022-12-31 RX ADMIN — Medication 1 MILLIGRAM(S): at 21:24

## 2022-12-31 RX ADMIN — Medication 50 MILLIGRAM(S): at 21:24

## 2022-12-31 RX ADMIN — CARVEDILOL PHOSPHATE 6.25 MILLIGRAM(S): 80 CAPSULE, EXTENDED RELEASE ORAL at 09:04

## 2022-12-31 RX ADMIN — Medication 1 TABLET(S): at 09:04

## 2023-01-01 RX ADMIN — CARVEDILOL PHOSPHATE 6.25 MILLIGRAM(S): 80 CAPSULE, EXTENDED RELEASE ORAL at 08:34

## 2023-01-01 RX ADMIN — Medication 650 MILLIGRAM(S): at 13:30

## 2023-01-01 RX ADMIN — Medication 50 MILLIGRAM(S): at 21:19

## 2023-01-01 RX ADMIN — CARVEDILOL PHOSPHATE 6.25 MILLIGRAM(S): 80 CAPSULE, EXTENDED RELEASE ORAL at 21:20

## 2023-01-01 RX ADMIN — RISPERIDONE 2 MILLIGRAM(S): 4 TABLET ORAL at 21:19

## 2023-01-01 RX ADMIN — MONTELUKAST 10 MILLIGRAM(S): 4 TABLET, CHEWABLE ORAL at 08:35

## 2023-01-01 RX ADMIN — Medication 650 MILLIGRAM(S): at 14:00

## 2023-01-01 RX ADMIN — Medication 1 MILLIGRAM(S): at 21:19

## 2023-01-01 RX ADMIN — Medication 1 TABLET(S): at 08:34

## 2023-01-02 RX ORDER — LIDOCAINE 4 G/100G
1 CREAM TOPICAL ONCE
Refills: 0 | Status: COMPLETED | OUTPATIENT
Start: 2023-01-02 | End: 2023-01-02

## 2023-01-02 RX ADMIN — Medication 1 TABLET(S): at 09:58

## 2023-01-02 RX ADMIN — MONTELUKAST 10 MILLIGRAM(S): 4 TABLET, CHEWABLE ORAL at 09:58

## 2023-01-02 RX ADMIN — Medication 3 MILLIGRAM(S): at 23:54

## 2023-01-02 RX ADMIN — CARVEDILOL PHOSPHATE 6.25 MILLIGRAM(S): 80 CAPSULE, EXTENDED RELEASE ORAL at 20:55

## 2023-01-02 RX ADMIN — LIDOCAINE 1 PATCH: 4 CREAM TOPICAL at 16:24

## 2023-01-02 RX ADMIN — RISPERIDONE 2 MILLIGRAM(S): 4 TABLET ORAL at 20:56

## 2023-01-02 RX ADMIN — Medication 50 MILLIGRAM(S): at 20:55

## 2023-01-03 PROCEDURE — 99231 SBSQ HOSP IP/OBS SF/LOW 25: CPT

## 2023-01-03 RX ORDER — LIDOCAINE 4 G/100G
1 CREAM TOPICAL
Qty: 1 | Refills: 0
Start: 2023-01-03 | End: 2023-02-01

## 2023-01-03 RX ORDER — LIDOCAINE 4 G/100G
1 CREAM TOPICAL ONCE
Refills: 0 | Status: COMPLETED | OUTPATIENT
Start: 2023-01-03 | End: 2023-01-03

## 2023-01-03 RX ORDER — LIDOCAINE 4 G/100G
1 CREAM TOPICAL DAILY
Refills: 0 | Status: DISCONTINUED | OUTPATIENT
Start: 2023-01-04 | End: 2023-01-04

## 2023-01-03 RX ORDER — RISPERIDONE 4 MG/1
1 TABLET ORAL AT BEDTIME
Refills: 0 | Status: DISCONTINUED | OUTPATIENT
Start: 2023-01-03 | End: 2023-01-04

## 2023-01-03 RX ORDER — MULTIVIT-MIN/FERROUS GLUCONATE 9 MG/15 ML
1 LIQUID (ML) ORAL
Qty: 30 | Refills: 0
Start: 2023-01-03 | End: 2023-02-01

## 2023-01-03 RX ORDER — MONTELUKAST 4 MG/1
1 TABLET, CHEWABLE ORAL
Qty: 30 | Refills: 0
Start: 2023-01-03 | End: 2023-02-01

## 2023-01-03 RX ORDER — TRAZODONE HCL 50 MG
1 TABLET ORAL
Qty: 30 | Refills: 0
Start: 2023-01-03 | End: 2023-02-01

## 2023-01-03 RX ADMIN — CARVEDILOL PHOSPHATE 6.25 MILLIGRAM(S): 80 CAPSULE, EXTENDED RELEASE ORAL at 08:58

## 2023-01-03 RX ADMIN — CARVEDILOL PHOSPHATE 6.25 MILLIGRAM(S): 80 CAPSULE, EXTENDED RELEASE ORAL at 20:30

## 2023-01-03 RX ADMIN — Medication 50 MILLIGRAM(S): at 20:30

## 2023-01-03 RX ADMIN — Medication 1 TABLET(S): at 08:57

## 2023-01-03 RX ADMIN — LIDOCAINE 1 PATCH: 4 CREAM TOPICAL at 18:39

## 2023-01-03 RX ADMIN — Medication 3 MILLIGRAM(S): at 20:53

## 2023-01-03 RX ADMIN — PALIPERIDONE 156 MILLIGRAM(S): 1.5 TABLET, EXTENDED RELEASE ORAL at 15:09

## 2023-01-03 RX ADMIN — RISPERIDONE 1 MILLIGRAM(S): 4 TABLET ORAL at 20:30

## 2023-01-03 RX ADMIN — MONTELUKAST 10 MILLIGRAM(S): 4 TABLET, CHEWABLE ORAL at 08:57

## 2023-01-03 RX ADMIN — Medication 1 MILLIGRAM(S): at 20:52

## 2023-01-03 RX ADMIN — LIDOCAINE 1 PATCH: 4 CREAM TOPICAL at 18:36

## 2023-01-03 RX ADMIN — LIDOCAINE 1 PATCH: 4 CREAM TOPICAL at 03:50

## 2023-01-03 NOTE — BH INPATIENT PSYCHIATRY PROGRESS NOTE - CURRENT MEDICATION
MEDICATIONS  (STANDING):  carvedilol 6.25 milliGRAM(s) Oral every 12 hours  influenza   Vaccine 0.5 milliLiter(s) IntraMuscular once  montelukast 10 milliGRAM(s) Oral daily  multivitamin/minerals 1 Tablet(s) Oral daily  paliperidone Injectable, Long Acting 156 milliGRAM(s) IntraMuscular once  risperiDONE   Tablet 2 milliGRAM(s) Oral at bedtime  traZODone 50 milliGRAM(s) Oral at bedtime    MEDICATIONS  (PRN):  acetaminophen     Tablet .. 650 milliGRAM(s) Oral every 6 hours PRN Moderate Pain (4 - 6)  albuterol    90 MICROgram(s) HFA Inhaler 2 Puff(s) Inhalation every 6 hours PRN Bronchospasm  benztropine 1 milliGRAM(s) Oral at bedtime PRN eps  diphenhydrAMINE 50 milliGRAM(s) Oral every 6 hours PRN eps  diphenhydrAMINE Injectable 50 milliGRAM(s) IntraMuscular once PRN extreme eps  haloperidol     Tablet 5 milliGRAM(s) Oral Once PRN agitation secondary to psychosis  haloperidol    Injectable 5 milliGRAM(s) IntraMuscular Once PRN extreme agitation secondry to psychosis  hydrOXYzine hydrochloride 50 milliGRAM(s) Oral every 6 hours PRN Anxiety  LORazepam   Injectable 2 milliGRAM(s) IntraMuscular once PRN extreme anxiety  melatonin. 3 milliGRAM(s) Oral at bedtime PRN Insomnia

## 2023-01-03 NOTE — BH INPATIENT PSYCHIATRY PROGRESS NOTE - NSBHATTESTBILLINGAW_PSY_A_CORE
[id number]-Xocqjhmjrb Inpatient care - low complexity - 15 minutes
[id number]-Wrfqyohaiz Inpatient care - low complexity - 15 minutes
[id number]-Rmixfmqffx Inpatient care - low complexity - 15 minutes
[id number]-Qurajqbnqr Inpatient care - moderate complexity - 25 minutes
[id number]-Eljthbltzj Inpatient care - low complexity - 15 minutes
[id number]-Zgqkeexwld Inpatient care - low complexity - 15 minutes
14525-Xafozpufgp Inpatient care - low complexity - 15 minutes
[id number]-Eyefittqhx Inpatient care - low complexity - 15 minutes
68082-Dnqyhtbnav Inpatient care - low complexity - 15 minutes
24021-Snpvcrqiui Inpatient care - low complexity - 15 minutes

## 2023-01-03 NOTE — BH DISCHARGE NOTE NURSING/SOCIAL WORK/PSYCH REHAB - NSDCPRGOAL_PSY_ALL_CORE
Writer met with patient to safety plan for discharge and discuss the patient’s progress throughout her inpatient stay. Patient was receptive to safety planning and identified coping skills, warning signs, sources of support, and stated reasons for living. Upon admission, the patient was brought in by her  for bizarre behavior. The patient met her psychiatric rehabilitation goal to identify 2 coping skills that help mitigate hallucinations for her psychotic symptoms goal. The patient reported talking her problems out, expressing her feelings, socializing, cooking, coloring, painting, and spending time with her family. The patient reported she is looking forward to seeing her family and grandchildren and spending time with them. The patient attended leisure, psychoeducation, creative art, self-care, and DBT Psychiatric Rehabilitation groups. The patient engages with her peers, adhered to her medications, and has good ADL’s and behavioral control. The patient denied SI/HI/AH/VH. Psychiatric Rehabilitation staff recommends that the patient engage in outpatient services for continued medication and symptom management. Excision Depth: adipose tissue

## 2023-01-03 NOTE — BH INPATIENT PSYCHIATRY PROGRESS NOTE - NSBHFUPINTERVALHXFT_PSY_A_CORE
pt seen for f/u for psychosis.  VSS chart reviewed and case discussed with treatment team.  no overt events overnight.    pt is compliant with medications denies SE.  denies auditory and visual hallucinations.  pt denies suicidal and ideation.  pt reporting had went to groups and participates.  pt reporting good sleep and appetite.

## 2023-01-03 NOTE — BH DISCHARGE NOTE NURSING/SOCIAL WORK/PSYCH REHAB - PATIENT PORTAL LINK FT
You can access the FollowMyHealth Patient Portal offered by [facility] by registering at the following website: [email or web address]. By joining [facility]’s FollowMyHealth portal, you will also be able to view your health information using other applications (apps) compatible with our system.

## 2023-01-03 NOTE — BH TREATMENT PLAN - NSPTSTATEDGOAL_PSY_ALL_CORE
" I would like to take better control of my illness, then return to my family"
"I would like to get better"
" I would like get better control of my mental illness"

## 2023-01-03 NOTE — BH INPATIENT PSYCHIATRY PROGRESS NOTE - NSBHASSESSSUMMFT_PSY_ALL_CORE
"Patient is a 52 year old female unemployed lives with . PPH MDD with psychosis per Psyckes & Anxiety. Patient has a history of inpatient admission at [facility] after OD 2 years ago. She has 1 past suicide attempt 2 years ago which led to her inpatient admission. She has no history of aggression, violence or legal issues She has no history of drug or alcohol. PMH- migraines, asthma, chf, MARGARETH, s/p gastic bypass & chronic pain s/p right ankle surgery. BIBA  for bizarre behavior    Upon interview.  pt is calm and cooperative.   pt denies AH/VH, SI/HI,  pt is compliant with medications denies SE.  pt reporting had went to groups.  pt reporting good sleep and appetite.   continue with medication regimen and inpatient hospitalization. pending discharge for tomorrow.      1. legal status 9.39  2. routine check no need for constant observation  3.MEDICATIONS  (STANDING):  carvedilol 6.25 milliGRAM(s) Oral every 12 hours  influenza   Vaccine 0.5 vivienne Liter(s) Intramuscular once  montelukast 10 vivienne GRAM(s) Oral daily, multivitamin oral once daily starting on 12/30  decrease Risperidone   Tablet 2 vivienne GRAM(s) Oral at bedtime. considering RAMÍREZ  albuterol    90 MICRO gram(s) HFA Inhaler 2 Puff(s) Inhalation every 6 hours PRN Bronchospasm  Diphenhydramine 50 vivienne GRAM(s) Oral every 6 hours PRN eps  diphenhydramine Injectable 50 vivienne GRAM(s) Intramuscular once PRN extreme eps  haloperidol     Tablet 5 vivienne GRAM(s) Oral Once PRN agitation secondary to psychosis  haloperidol    Injectable 5 vivienne GRAM(s) Intramuscular Once PRN extreme agitation secondary to psychosis  hydroxyzine hydrochloride 50 vivienne GRAM(s) Oral every 6 hours PRN Anxiety  Lorazepam     Tablet 2 vivienne GRAM(s) Oral every 2 hours PRN CIWA-A score increase by 2 points and current CIWA-A score GREATER THAN 9  Lorazepam   Injectable 2 vivienne GRAM(s) Intramuscular once PRN extreme anxiety  melatonin. 3 vivienne GRAM(s) Oral at bedtime PRN Insomnia  4. ekg- 352/442 ms nsr  5. individual and group ,milieu therapy  6. discharge patient when clinically  appropriate pending discharge on 1/3  7. Invega Sustenna 156mg on 1/3/22

## 2023-01-03 NOTE — BH DISCHARGE NOTE NURSING/SOCIAL WORK/PSYCH REHAB - NSDCVIVACCINE_GEN_ALL_CORE_FT
influenza, injectable, quadrivalent, preservative free; 23-Oct-2014 17:30; Arline Motley (RN); [employer]; [id number]; IntraMuscular; Deltoid Left.; 0.5 milliLiter(s);   Tdap; 19-Dec-2022 11:24; Lin Denis (RN); [employer]; [id number]   (Exp. Date: 08-Dec-2024); IntraMuscular; Deltoid Right.; 0.5 milliLiter(s); VIS (VIS Published: 09-May-2013, VIS Presented: 19-Dec-2022);

## 2023-01-03 NOTE — BH TREATMENT PLAN - NSTXPLANTHERAPYSESSIONSFT_PSY_ALL_CORE
12-25-22  Type of therapy: Coping skills,Creative arts therapy,Leisure development,Peer advocate,Pet therapy,Spirituality  Type of session: Individual  Level of patient participation: Engaged  Duration of participation: 15 minutes  Therapy conducted by: Psych rehab  Therapy Summary: Writer met with Pt to assess her progress towards Psych Rehab goal. Pt was receptive. Pt was seen in the day area of the unit. Pt was engaged, calm and pleasant during the session. Pt reports significant improvement in mood. Pt reports that she can express her emotions and open up to staff now. Pt denies SI/HI/AH/VH. Pt reports good sleep and appetite. Pt is in complaint with meds and appears motivated towards treatment. Pt reports that she will continue outpt upon discharge since she realized the importance of therapy being in inpt. Over the past week, Pt made progress towards Psych rehab goal of identifying coping skills to help mitigate hallucination. Pt reports socialization, expressing emotion and maintain healthy boundaries as her coping skill. Writer provided praise and support. Pt was receptive. Writer discussed deep breathing skill and establishing healthy boundaries with family members and encouraged Pt to utilize during moments of distress. Pt reports that she finds deep breathing beneficial which helps her with anxiety. Pt attends most of the groups of Psych Rehab and participates appropriately. Pt is in good behavioral control with good ADLs. Pt is visible santosh the unit. Over the next seven days, Psychiatric rehabilitation staff will continue to provide encouragement, support, psychotherapy, and psychoeducation to assist the patient in the progression of her treatment goal.  
  01-01-23  Type of therapy: Dialectical behavior therapy,Creative arts therapy,Leisure development,Peer advocate,Psychoeducation  Type of session: Individual  Level of patient participation: Engaged  Duration of participation: 15 minutes  Therapy conducted by: Psych rehab  Therapy Summary: The writer met with the patient to assess progress of their psychiatric rehabilitation goal over the past week. The patient made improvement towards her psychiatric rehabilitation goal to identify 2 coping skills that help mitigate hallucinations for her psychotic symptoms goal. The patient reported talking her problems out, expressing her feelings, socializing, cooking, coloring, painting, and spending time with her family. The patient is compliant with her medications, engages with her peers, and has good ADL’s and behavioral control. The patient attended 60% of the Psychiatric Rehabilitation groups in the past seven days which were on the topics of DBT, leisure, creative art, psychoeducation, and peer advocate. The patient reported that she is looking forward to going home and has been seeing and feeling the progress in her treatment.  Over the next seven days, Psychiatric Rehabilitation staff will continue to provide encouragement, support, psychotherapy, and psychoeducation to assist the patient in the progression of her treatment goal and engagement with activities. The patient denied SI/HI/AH/VH.

## 2023-01-03 NOTE — BH TREATMENT PLAN - NSTXDCOTHRINTERSW_PSY_ALL_CORE
SW will provide psychoed., support and discharge planning. Collateral/family supports to be contacted accordingly.
SW will encourage pt to continue to comply with tx and medication post d/c to increase her likelihood of psychiatric stability in the community.
SW will provide psychoed., support and discharge planning. Collateral/family supports to be contacted accordingly.

## 2023-01-03 NOTE — BH INPATIENT PSYCHIATRY PROGRESS NOTE - NSBHMETABOLIC_PSY_ALL_CORE_FT
BMI:   HbA1c: A1C with Estimated Average Glucose Result: 5.0 % (12-21-22 @ 08:00)    Glucose: POCT Blood Glucose.: 145 mg/dL (12-19-22 @ 08:32)    BP: --  Lipid Panel: Date/Time: 12-21-22 @ 08:00  Cholesterol, Serum: 135  Direct LDL: --  HDL Cholesterol, Serum: 41  Total Cholesterol/HDL Ration Measurement: --  Triglycerides, Serum: 83

## 2023-01-03 NOTE — BH TREATMENT PLAN - NSTXPSYCHOINTERPR_PSY_ALL_CORE
Over the next 7 days, Psychiatric Rehabilitation staff will utilize group and individual psychotherapy, and psychoeducation to assist the patient in reaching her treatment goal.
The writer met with the patient to assess progress of their psychiatric rehabilitation goal over the past week. The patient made improvement towards her psychiatric rehabilitation goal to identify 2 coping skills that help mitigate hallucinations for her psychotic symptoms goal. The patient reported talking her problems out, expressing her feelings, socializing, cooking, coloring, painting, and spending time with her family. The patient is compliant with her medications, engages with her peers, and has good ADL’s and behavioral control. Over the next seven days, Psychiatric Rehabilitation staff will continue to provide encouragement, support, psychotherapy, and psychoeducation to assist the patient in the progression of her treatment goal and engagement with activities. The patient denied SI/HI/AH/VH.
Over the next seven days, Psychiatric rehabilitation staff will continue to provide encouragement, support, psychotherapy, and psychoeducation to assist the patient in the progression of her treatment goal.

## 2023-01-03 NOTE — BH INPATIENT PSYCHIATRY PROGRESS NOTE - NSBHCHARTREVIEWVS_PSY_A_CORE FT
Vital Signs Last 24 Hrs  T(C): 36.8 (01-03-23 @ 07:20), Max: 36.8 (01-03-23 @ 07:20)  T(F): 98.2 (01-03-23 @ 07:20), Max: 98.2 (01-03-23 @ 07:20)  HR: --  BP: --  BP(mean): --  RR: 19 (01-03-23 @ 07:20) (19 - 19)  SpO2: 98% (01-03-23 @ 07:20) (97% - 99%)    Orthostatic VS  01-03-23 @ 07:20  Lying BP: --/-- HR: --  Sitting BP: 117/76 HR: 100  Standing BP: 114/66 HR: 102  Site: --  Mode: --  Orthostatic VS  01-02-23 @ 20:33  Lying BP: --/-- HR: --  Sitting BP: 114/68 HR: 105  Standing BP: 107/65 HR: 125  Site: --  Mode: --  Orthostatic VS  01-02-23 @ 07:36  Lying BP: --/-- HR: --  Sitting BP: 106/73 HR: 81  Standing BP: 96/77 HR: 82  Site: --  Mode: --  Orthostatic VS  01-01-23 @ 20:45  Lying BP: --/-- HR: --  Sitting BP: 117/72 HR: 88  Standing BP: 109/67 HR: 93  Site: --  Mode: --

## 2023-01-03 NOTE — BH DISCHARGE NOTE NURSING/SOCIAL WORK/PSYCH REHAB - DISCHARGE INSTRUCTIONS AFTERCARE APPOINTMENTS
In order to check the location, date, or time of your aftercare appointment, please refer to your Discharge Instructions Document given to you upon leaving the hospital.  If you have lost the instructions please call [phone]

## 2023-01-04 VITALS — RESPIRATION RATE: 18 BRPM | TEMPERATURE: 98 F | OXYGEN SATURATION: 99 %

## 2023-01-04 PROCEDURE — 99231 SBSQ HOSP IP/OBS SF/LOW 25: CPT

## 2023-01-04 RX ADMIN — Medication 1 TABLET(S): at 08:29

## 2023-01-04 RX ADMIN — LIDOCAINE 1 PATCH: 4 CREAM TOPICAL at 08:42

## 2023-01-04 RX ADMIN — MONTELUKAST 10 MILLIGRAM(S): 4 TABLET, CHEWABLE ORAL at 08:29

## 2023-01-04 NOTE — BH INPATIENT PSYCHIATRY DISCHARGE NOTE - HPI (INCLUDE ILLNESS QUALITY, SEVERITY, DURATION, TIMING, CONTEXT, MODIFYING FACTORS, ASSOCIATED SIGNS AND SYMPTOMS)
As per ER note: "Patient is a 52 year old female unemployed lives with . PPH MDD with psychosis per Psyckes & Anxiety. Patient has a history of inpatient admission at [facility] after OD 2 years ago. She has 1 past suicide attempt 2 years ago which led to her inpatient admission. She has no history of aggression, violence or legal issues She has no history of drug or alcohol. PMH- migraines, asthma, chf, MARGARETH, s/p gastic bypass & chronic pain s/p right ankle surgery. BIBA  for bizarre behavior.  Patient noted to be crying screaming "Sivan! Save Sivan!" She asked evaluator if they were going to take her to Sivan. She stated "she , he murdered her." "Silus, Silus, come out of the closet" and was pointing at the door. Patient then screamed and said "snakes, snakes, they're all over the floor." She then pointed to her arm and stated bugs were crawling all over her. She was AOx1- she did not know where she was, she stated she was in [place] but wasn't sure what kind of building this was. She could not recall her year of birth. She was able to name objects but was unsure regarding the state, country, who the president was, season or date. She stated she doesn't know who she lives with. Patient struggled to participate in interview due to level of disorganization. She then screamed about the snakes and the bugs crawling all over her."    Pt seen with RN present.  Pt could only tolerate limited interview, began stating that her fingers were tingling and that she had to leave room.  Then began yelping "TAVERAS!" repeatedly, stating that there was an evil spirit in the room.  Pt left room and walked with writer to her room, pt identified Sivan as her daughter.  When asked about Silus, pt stops walking, begins crying and wailing loudly, and shouts "Silus" several times on way to her room.  Does not answer any other questions.

## 2023-01-04 NOTE — BH INPATIENT PSYCHIATRY PROGRESS NOTE - NSTXDCOTHRDATEEST_PSY_ALL_CORE
19-Dec-2022
03-Jan-2023
19-Dec-2022
03-Jan-2023
19-Dec-2022

## 2023-01-04 NOTE — BH INPATIENT PSYCHIATRY PROGRESS NOTE - NSBHFUPINTERVALCCFT_PSY_A_CORE
" Im very emotional"
" im doing good"
Seen for psychosis.
" Im little anxious today"
" Im doing good"
" Im feeling better"
" Im ok"
" Im doing good"
" Im doing better"
" Im feeling better"
" I am doing well"

## 2023-01-04 NOTE — BH INPATIENT PSYCHIATRY PROGRESS NOTE - NS ED BHA REVIEW OF ED CHART VITAL SIGNS REVIEWED
Rec'd another fax request for refill of HYDROCORTISO CRE 2.5% .     Apply topically to affected area(s) twice daily as needed.     [facility] [phone]  Or fax [phone]    Thank you.   
Yes

## 2023-01-04 NOTE — BH INPATIENT PSYCHIATRY PROGRESS NOTE - NSTXDCOTHRGOAL_PSY_ALL_CORE
Pt. will comply with treatment recommendations within seven days.

## 2023-01-04 NOTE — BH INPATIENT PSYCHIATRY PROGRESS NOTE - NSDCCRITERIA_PSY_ALL_CORE
When pt is no longer an acute or imminent risk of harm to self or others, and is able to care for self safely, pt may then be discharged. 

## 2023-01-04 NOTE — BH INPATIENT PSYCHIATRY PROGRESS NOTE - NSTXPSYCHODATETRGT_PSY_ALL_CORE
04-Jan-2023
22-Dec-2022
01-Jan-2023
25-Dec-2022
01-Jan-2023
25-Dec-2022
04-Jan-2023
01-Jan-2023
01-Jan-2023

## 2023-01-04 NOTE — BH INPATIENT PSYCHIATRY DISCHARGE NOTE - HOSPITAL COURSE
Dates of Hospitalization: 12/17/22-1/4/22    The patient wad admitted to the unit for bizarre behavior.  Patient reporting with poor sleep. Patient reporting auditory hallucinations. Patient  presented with symptoms of agitation and intermittently calling out family's names.   had reported non compliance with medication.   Patient continued with Risperidone on admission.  discussed with patient to increase medication.  She had tolerated well.  Discussed with patient regarding to start Invega Sustenna.  Patient verbalized agreement.  Patient will has received two doses of RAMÍREZ.  Patient had tolerated well.      Patient had show ed improvement in symptoms.   Patient did not report visual hallucinations.  Patient is alert, oriented to place and situation.  she is more organized and clearer in her thoughts.  There were no behavioral problems on the unit.  Patient did not become agitated and did not require emergent intramuscular medications or seclusion/restraints.  Patient did not self-harm on the unit. Patient remained actively engaged in treatment. Patient participated in individual, group, and milieu therapy. Patient got along appropriately with staff and peers.       On the day of discharge, the patient has continued to show improvement in symptoms.  she is more stable, less irritable, no longer agitated, controlled in her  behavior. Patient denies visual hallucinations.  The patient is at moderate chronic risk for suicide/self harm/violence, but low acute risk for suicide/self harm/violence. Statistical risk factors include; psychosis, hx 1 of hospitalizations, history 1 of SA, history of non-complianc.  Acute risk factors present on admission but mitigated during hospitalization include; agitation  Acute risk factors were mitigated during admission via Risperidone, Invega Sustenna, I/G/M therapy, safety planning, and psychoeducation. Protective factors include; good response to treatment, forward thinking regarding, family, engaged in treatment. Given protective factors, and that acute risk factors present on admission have been addressed and are no longer present at discharge, patient has returned to baseline level of acute risk and the patient no longer requires inpatient hospitalization for stabilization or safety. The patient is therefore appropriate for discharge to outpatient care. Chronic risk can be further mitigated by continued engagement with outpatient treatment, RAMÍREZ,  At the time of discharge the patient engaged meaningfully in safety planning and was discharge home to outpatient treatment.        There were no acute medical issues or events during the course of hospitalization.     Discharge Diagnosis:            discharge medications  montelukast 10 milligrams Oral daily  multivitamin/minerals 1 Tablet oral daily  Trazadone 50 milligrams Oral at bedtime    Pt will f/u with crisis clinic   and then AOPD   Dates of Hospitalization: 12/17/22-1/4/22    The patient wad admitted to the unit for bizarre behavior.  Patient reporting with poor sleep. Patient reporting auditory hallucinations. Patient  presented with symptoms of agitation and intermittently calling out family's names.   had reported non compliance with medication.   Patient continued with Risperidone on admission.  discussed with patient to increase medication.  She had tolerated well.  Discussed with patient regarding to start Invega Sustenna.  Patient verbalized agreement.  Patient will has received two doses of RAMÍREZ.  Patient had tolerated well.      Patient had show ed improvement in symptoms.   Patient did not report visual hallucinations.  Patient is alert, oriented to place and situation.  she is more organized and clearer in her thoughts.  There were no behavioral problems on the unit.  Patient did not become agitated and did not require emergent intramuscular medications or seclusion/restraints.  Patient did not self-harm on the unit. Patient remained actively engaged in treatment. Patient participated in individual, group, and milieu therapy. Patient got along appropriately with staff and peers.       On the day of discharge, the patient has continued to show improvement in symptoms.  she is more stable, less irritable, no longer agitated, controlled in her  behavior. Patient denies visual hallucinations.  The patient is at moderate chronic risk for suicide/self harm/violence, but low acute risk for suicide/self harm/violence. Statistical risk factors include; MDD with psychosis, hx 1 of hospitalizations, history 1 of SA, history of non-complianc.  Acute risk factors present on admission but mitigated during hospitalization include; agitation  Acute risk factors were mitigated during admission via Risperidone, Invega Sustenna, I/G/M therapy, safety planning, and psychoeducation. Protective factors include; good response to treatment, forward thinking regarding, family, engaged in treatment. Given protective factors, and that acute risk factors present on admission have been addressed and are no longer present at discharge, patient has returned to baseline level of acute risk and the patient no longer requires inpatient hospitalization for stabilization or safety. The patient is therefore appropriate for discharge to outpatient care. Chronic risk can be further mitigated by continued engagement with outpatient treatment, RAMÍREZ,  At the time of discharge the patient engaged meaningfully in safety planning and was discharge home to outpatient treatment.        There were no acute medical issues or events during the course of hospitalization.     Discharge Diagnosis: MDD with psychosis           discharge medications  montelukast 10 milligrams Oral daily  multivitamin/minerals 1 Tablet oral daily  Trazadone 50 milligrams Oral at bedtime    Pt will f/u with crisis clinic   and then AOPD   Dates of Hospitalization: 12/17/22-1/4/22    The patient wad admitted to the unit for bizarre behavior.  Patient reporting with poor sleep. Patient reporting auditory hallucinations. Patient  presented with symptoms of agitation and intermittently calling out family's names.   had reported non compliance with medication.   Patient continued with Risperidone on admission.  discussed with patient to increase medication.  She had tolerated well.  Discussed with patient regarding to start Invega Sustenna.  Patient verbalized agreement.  Patient will has received two doses of RAMÍREZ.  Patient had tolerated well.      Patient had shown improvement in symptoms.   Patient did not report visual hallucinations.  Patient is alert, oriented to place and situation.  she is more organized and clearer in her thoughts.  There were no behavioral problems on the unit.  Patient did not become agitated and did not require emergent intramuscular medications or seclusion/restraints.  Patient did not self-harm on the unit. Patient remained actively engaged in treatment. Patient participated in individual, group, and milieu therapy. Patient got along appropriately with staff and peers.       On the day of discharge, the patient has continued to shown improvement in symptoms.  she is more stable, less irritable, no longer agitated, controlled in her  behavior. Patient denies visual hallucinations.  The patient is at moderate chronic risk for suicide/self harm/violence, but low acute risk for suicide/self harm/violence. Statistical risk factors include; MDD with psychosis, hx 1 of hospitalizations, history 1 of SA, history of non-complianc.  Acute risk factors present on admission but mitigated during hospitalization include; agitation  Acute risk factors were mitigated during admission via Risperidone, Invega Sustenna, I/G/M therapy, safety planning, and psychoeducation. Protective factors include; good response to treatment, forward thinking regarding, family, engaged in treatment. Given protective factors, and that acute risk factors present on admission have been addressed and are no longer present at discharge, patient has returned to baseline level of acute risk and the patient no longer requires inpatient hospitalization for stabilization or safety. The patient is therefore appropriate for discharge to outpatient care. Chronic risk can be further mitigated by continued engagement with outpatient treatment, RAMÍREZ,  At the time of discharge the patient engaged meaningfully in safety planning and was discharged home to outpatient treatment.        There were no acute medical issues or events during the course of hospitalization.     Discharge Diagnosis: MDD with psychosis           discharge medications  montelukast 10 milligrams Oral daily  multivitamin/minerals 1 Tablet oral daily  Trazadone 50 milligrams Oral at bedtime    Pt will f/u with crisis clinic   and then AOPD

## 2023-01-04 NOTE — BH INPATIENT PSYCHIATRY PROGRESS NOTE - NSBHASSESSSUMMFT_PSY_ALL_CORE
"Patient is a 52 year old female unemployed lives with . PPH MDD with psychosis per [facility]. Patient has a history of inpatient admission at [facility] after OD 2 years ago. She has 1 past suicide attempt 2 years ago which led to her inpatient admission. She has no history of aggression, violence or legal issues She has no history of drug or alcohol. PMH- migraines, asthma, chf, MARGARETH, s/p gastic bypass & chronic pain s/p right ankle surgery. BIBA  for bizarre behavior    Upon interview, pt is calm and cooperative.  patient symptoms have improved.  pt reporting good sleep and appetite.  pt denies auditory and visual  hallucinations.  pt denies suicidal and homicidal ideation.  pt engaged in groups and participates.  pt compliant with medications.  pt did not have  behavioral issues on the unit.  pt looking forward to returning to grandchildren and family.  discussed with patient if symptoms worsen to contact  provider/ call 911.   discuss with patient to take medications every day and to not skip a dose. pt is stable for discharge home and to continue with  outpatient treatment.    discharge medications  montelukast 10 milligrams Oral daily  multivitamin oral once daily   Trazadone 50mg orally once a day at bedtime  continue with [facility] monthly outpatient  f/u with crisis center, and [facility]

## 2023-01-04 NOTE — BH INPATIENT PSYCHIATRY PROGRESS NOTE - NSBHATTESTAPPBILLTIME_PSY_A_CORE
I attest my time as GENIE is greater than 50% of the total combined time spent on qualifying patient care activities. I have reviewed and verified the documentation.

## 2023-01-04 NOTE — BH INPATIENT PSYCHIATRY PROGRESS NOTE - NSBHATTESTTYPEVISIT_PSY_A_CORE
Attending Only
On-site Attending supervising GENIE (99XXX codes)

## 2023-01-04 NOTE — BH INPATIENT PSYCHIATRY PROGRESS NOTE - NSTXPSYCHOGOAL_PSY_ALL_CORE
Will identify 2 coping skills that help mitigate hallucinations
Will identify 1 thought/feeling/behavior associated with hallucinations

## 2023-01-04 NOTE — BH INPATIENT PSYCHIATRY PROGRESS NOTE - MSE UNSTRUCTURED FT
Appearance: pt dressed in casual attire fair hygiene  Attitude/Behavior: calm and cooperative  Psychomotor: no agitation noted  Gait: steady gait  Quality of speech normal in rate, rhythm, and volume  Mood: "good"  Affect type: euthymic   Affect Range: limited  Thought process: normal  Thought Content: denies SI/HI  Perceptual: denies AH/VH  Insight:fair   Judgement: fair  Impulse Control: normal during interview
Appearance: pt dressed in hospital attire fair hygiene  Attitude/Behavior: calm and cooperative  Psychomotor: no agitation noted  Gait: steady gait  Quality of speech normal in rate, rhythm, and volume  Mood: " im doing better"  Affect type: euthymic   Affect Range: limited  Thought process: normal  Thought Content: denies SI/HI  Perceptual: denies AH/VH  Insight:fair   Judgement: fair  Impulse Control: normal during interview
Appearance: Dressed appropriately.  Found resting in room.   Behavior: Limited cooperation.  Oddly related.  Blank stare.   Motor: Psychomotor slowed.   Speech: Laconic, increased latency.   Mood: Ok  Affect: Neutral, flat.   Thought Process: [place].   Associations: Limited  Thought Content: Poverty of TC, denies AVH, SIIP, HIIP.  Insight: Poor.   Judgment: Poor.   Attention: Poor.   Language: Fluent.  Gait/station: supine.
Appearance: Dressed in hospital attire fair hygiene  Behavior: calm and cooperative. appears slightly brighter today.  Motor: no agitation noted   Speech: Laconic, some latency, normal in rate and rhythm  Mood: " im good"  Affect: Neutral, slightly flat.   Thought Process: [place].   Associations: Limited  Thought Content: Poverty of TC, denies AVH, SIIP, HIIP.  Insight: fair   Judgment: fair  Attention: fair  Language: Fluent.  Gait/station: steady gait
Appearance: pt dressed in casual attire fair hygiene  Attitude/Behavior: calm and cooperative  Psychomotor: no agitation noted  Gait: steady gait  Quality of speech normal in rate, rhythm, and volume  Mood: euthymic  Affect type: pleasant  Affect Range: full  Thought process: normal  Thought Content: denies SI/HI  Perceptual: denies AH/ VH  Insight: fair   Judgement: fair  Impulse Control: normal during interview
Appearance: pt dressed in hospital attire fair hygiene  Attitude/Behavior: calm and cooperative  Psychomotor: no agitation noted  Gait: steady gait  Quality of speech normal in rate, rhythm, and volume  Mood: " im a little anxious"  Affect type: irritable, due to poor sleep  Affect Range: limited  Thought process: normal  Thought Content: denies SI/HI  Perceptual: denies AH/VH  Insight:fair   Judgement: fair  Impulse Control: normal during interview
Appearance: Dressed in hospital attire fair hygiene  Behavior: calm and cooperative.   Motor: no agitation noted   Speech: normal in rate ,rhythm and volume  Mood: " im doing better"  Affect: pleasant    Thought Process: normal  Associations: Limited  Thought Content: Poverty of TC, denies AVH, SIIP, HIIP.  Insight: fair   Judgment: fair  Attention: fair  Language: Fluent.  Gait/station: steady gait
Appearance: Dressed in hospital attire fair hygiene  Behavior: calm and cooperative. appears slightly brighter today.  Motor: no agitation noted   Speech: normal in rate ,rhythm and volume  Mood: " im doing good"  Affect: Neutral, slightly flat.   Thought Process: [place].   Associations: Limited  Thought Content: Poverty of TC, denies AVH, SIIP, HIIP.  Insight: fair   Judgment: fair  Attention: fair  Language: Fluent.  Gait/station: steady gait
Appearance: pt dressed in hospital attire fair hygiene  Attitude/Behavior: calm and cooperative  Psychomotor: no agitation noted  Gait: steady gait  Quality of speech normal in rate, rhythm, and volume  Mood: neutral   Affect type: pleasant  Affect Range: limited  Thought process: normal  Thought Content: denies SI/HI  Perceptual: denies AH/ VH  Insight: fair   Judgement: fair  Impulse Control: normal during interview
Appearance: Dressed in hospital attire fair hygiene  Behavior: calm and cooperative.  Oddly related, without emotional expressed   Motor: Psychomotor slowed.   Speech: Laconic, increased latency.   Mood: " im good"  Affect: Neutral, flat.   Thought Process: [place].   Associations: Limited  Thought Content: Poverty of TC, denies AVH, SIIP, HIIP.  Insight: Poor   Judgment: fair  Attention: fair  Language: Fluent.  Gait/station: steady gait
Appearance: Dressed in hospital attire fair hygiene  Behavior: calm and cooperative.  Oddly related, without emotional expressed   Motor: Psychomotor slowed.   Speech: Laconic, increased latency.   Mood: " im good"  Affect: Neutral, flat.   Thought Process: [place].   Associations: Limited  Thought Content: Poverty of TC, denies AVH, SIIP, HIIP.  Insight: Poor   Judgment: fair  Attention: fair  Language: Fluent.  Gait/station: steady gait

## 2023-01-04 NOTE — BH INPATIENT PSYCHIATRY DISCHARGE NOTE - NSDCMRMEDTOKEN_GEN_ALL_CORE_FT
albuterol 90 mcg/inh inhalation aerosol: 2 puff(s) inhaled every 6 hours, As needed, Bronchospasm  benztropine 1 mg oral tablet: 1 tab(s) orally once a day (at bedtime), As needed, eps  carvedilol 6.25 mg oral tablet: 1 tab(s) orally every 12 hours  montelukast 10 mg oral tablet: 1 tab(s) orally once a day  Multiple Vitamins with Minerals oral tablet: 1 tab(s) orally once a day  paliperidone 156 mg/mL intramuscular suspension, extended release: 117 milligram(s) intramuscularly once a month   risperiDONE 3 mg oral tablet: 1 tab(s) orally once a day (at bedtime)  traZODone 50 mg oral tablet: 1 tab(s) orally once a day (at bedtime)   albuterol 90 mcg/inh inhalation aerosol: 2 puff(s) inhaled every 6 hours, As needed, Bronchospasm  carvedilol 6.25 mg oral tablet: 1 tab(s) orally every 12 hours  montelukast 10 mg oral tablet: 1 tab(s) orally once a day  Multiple Vitamins with Minerals oral tablet: 1 tab(s) orally once a day  paliperidone 156 mg/mL intramuscular suspension, extended release: 117 milligram(s) intramuscularly once a month   risperiDONE 3 mg oral tablet: 1 tab(s) orally once a day (at bedtime)  traZODone 50 mg oral tablet: 1 tab(s) orally once a day (at bedtime)

## 2023-01-04 NOTE — BH INPATIENT PSYCHIATRY PROGRESS NOTE - NSTXDCOTHRPROGRES_PSY_ALL_CORE
Worsening
Worsening
Improving
Worsening
Met - goal discontinued
Worsening

## 2023-01-04 NOTE — BH INPATIENT PSYCHIATRY PROGRESS NOTE - NSBHCONSBHPROVDETAILS_PSY_A_CORE  FT
dr Arline tucker  [facility]. [phone]
dr Arline tucker  [facility]. [phone]
dr Arline tucker  [facility]. [phone]
dr Arline tucker  [facility]. [phone]
dr Arline tucker  [facility]. [phone]
dr Arline tucker  [facility]. [phone]
dr Arline tucker  [facility]. [phone]
dr Arline tucker  [facility]. [phone]

## 2023-01-04 NOTE — BH INPATIENT PSYCHIATRY DISCHARGE NOTE - ATTENDING DISCHARGE PHYSICAL EXAMINATION:
On day of discharge, the patient has improved significantly and no longer requires inpatient treatment and care. Patient denies all suicidal and aggressive ideation, intent and plan. Patient displays no psychotic symptoms. Patient is not judged to be an acute danger to self or others at this time.

## 2023-01-04 NOTE — BH INPATIENT PSYCHIATRY PROGRESS NOTE - NSBHATTESTBILLONSITE_PSY_A_CORE
GENIE to bill

## 2023-01-04 NOTE — BH INPATIENT PSYCHIATRY PROGRESS NOTE - CURRENT MEDICATION
MEDICATIONS  (STANDING):  carvedilol 6.25 milliGRAM(s) Oral every 12 hours  influenza   Vaccine 0.5 milliLiter(s) IntraMuscular once  lidocaine   4% Patch 1 Patch Transdermal daily  montelukast 10 milliGRAM(s) Oral daily  multivitamin/minerals 1 Tablet(s) Oral daily  risperiDONE   Tablet 1 milliGRAM(s) Oral at bedtime  traZODone 50 milliGRAM(s) Oral at bedtime    MEDICATIONS  (PRN):  acetaminophen     Tablet .. 650 milliGRAM(s) Oral every 6 hours PRN Moderate Pain (4 - 6)  albuterol    90 MICROgram(s) HFA Inhaler 2 Puff(s) Inhalation every 6 hours PRN Bronchospasm  benztropine 1 milliGRAM(s) Oral at bedtime PRN eps  diphenhydrAMINE 50 milliGRAM(s) Oral every 6 hours PRN eps  diphenhydrAMINE Injectable 50 milliGRAM(s) IntraMuscular once PRN extreme eps  haloperidol     Tablet 5 milliGRAM(s) Oral Once PRN agitation secondary to psychosis  haloperidol    Injectable 5 milliGRAM(s) IntraMuscular Once PRN extreme agitation secondry to psychosis  hydrOXYzine hydrochloride 50 milliGRAM(s) Oral every 6 hours PRN Anxiety  LORazepam   Injectable 2 milliGRAM(s) IntraMuscular once PRN extreme anxiety  melatonin. 3 milliGRAM(s) Oral at bedtime PRN Insomnia

## 2023-01-04 NOTE — BH INPATIENT PSYCHIATRY PROGRESS NOTE - NSBHFUPINTERVALHXFT_PSY_A_CORE
pt seen for f/u for psychosis. VSS chart reviewed and case discussed with treatment team.  pt is compliant with medications.  pt engaged in groups and participates.  pt denies auditory and visual hallucinations.   pt denies suicidal and homicidal ideation.  pt reporting better sleep and appetite.  patient looking forward to spending time with family and grandchildren.  Patient stating  she will work on herself.  She would like to teach daughter how to cook some over her special recipes.  discuss with patient to  take medications every day and to not skip a dose.  Discuss with patient  if symptoms worsen to contact provider/ call 911.  pt   verbalize understanding.    patient has not had behavioral issues on the unit.

## 2023-01-04 NOTE — BH INPATIENT PSYCHIATRY DISCHARGE NOTE - OTHER PAST PSYCHIATRIC HISTORY (INCLUDE DETAILS REGARDING ONSET, COURSE OF ILLNESS, INPATIENT/OUTPATIENT TREATMENT)
Pt. is a 51 y/o female domiciled with  with history of MDD with psychosis and anxiety with prior psych. hospitalization at [facility] about 2 years ago due to OD. Pt. has one prior suicide attempt which was the precipitant to the admission to [facility].  Pt. was bib spouse due to bizarre behavior

## 2023-01-04 NOTE — BH INPATIENT PSYCHIATRY PROGRESS NOTE - NSBHCHARTREVIEWVS_PSY_A_CORE FT
Vital Signs Last 24 Hrs  T(C): 36.7 (01-04-23 @ 07:17), Max: 36.9 (01-03-23 @ 20:22)  T(F): 98.1 (01-04-23 @ 07:17), Max: 98.4 (01-03-23 @ 20:22)  HR: --  BP: --  BP(mean): --  RR: 18 (01-04-23 @ 07:17) (18 - 18)  SpO2: 99% (01-04-23 @ 07:17) (97% - 99%)    Orthostatic VS  01-04-23 @ 07:17  Lying BP: --/-- HR: --  Sitting BP: 104/70 HR: 84  Standing BP: 98/71 HR: 90  Site: --  Mode: --  Orthostatic VS  01-03-23 @ 20:22  Lying BP: --/-- HR: --  Sitting BP: 122/76 HR: 119  Standing BP: 110/70 HR: 120  Site: --  Mode: --  Orthostatic VS  01-03-23 @ 07:20  Lying BP: --/-- HR: --  Sitting BP: 117/76 HR: 100  Standing BP: 114/66 HR: 102  Site: --  Mode: --  Orthostatic VS  01-02-23 @ 20:33  Lying BP: --/-- HR: --  Sitting BP: 114/68 HR: 105  Standing BP: 107/65 HR: 125  Site: --  Mode: --

## 2023-01-04 NOTE — BH INPATIENT PSYCHIATRY PROGRESS NOTE - NSICDXBHPRIMARYDX_PSY_ALL_CORE
Psychosis   F29  

## 2023-01-04 NOTE — BH INPATIENT PSYCHIATRY PROGRESS NOTE - NSTXPSYCHODATEEST_PSY_ALL_CORE
18-Dec-2022
17-Dec-2022
18-Dec-2022

## 2023-01-04 NOTE — BH INPATIENT PSYCHIATRY PROGRESS NOTE - NSTXDCOTHRDATETRGT_PSY_ALL_CORE
26-Dec-2022
10-Rodo-2023
26-Dec-2022
26-Dec-2022
04-Jan-2023
26-Dec-2022

## 2023-01-04 NOTE — BH INPATIENT PSYCHIATRY DISCHARGE NOTE - NSDCCPCAREPLAN_GEN_ALL_CORE_FT
PRINCIPAL DISCHARGE DIAGNOSIS  Diagnosis: MDD (major depressive disorder), recurrent, severe, with psychosis  Assessment and Plan of Treatment:        PRINCIPAL DISCHARGE DIAGNOSIS  Diagnosis: Psychosis  Assessment and Plan of Treatment:

## 2023-01-04 NOTE — BH INPATIENT PSYCHIATRY PROGRESS NOTE - NSCGISEVERILLNESS_PSY_ALL_CORE
4 = Moderately ill – overt symptoms causing noticeable, but modest, functional impairment or distress; symptom level may warrant medication
2 = Borderline mentally ill – subtle or suspected pathology
4 = Moderately ill – overt symptoms causing noticeable, but modest, functional impairment or distress; symptom level may warrant medication

## 2023-01-04 NOTE — BH INPATIENT PSYCHIATRY PROGRESS NOTE - PRN MEDS
MEDICATIONS  (PRN):  albuterol    90 MICROgram(s) HFA Inhaler 2 Puff(s) Inhalation every 6 hours PRN Bronchospasm  diphenhydrAMINE 50 milliGRAM(s) Oral every 6 hours PRN eps  diphenhydrAMINE Injectable 50 milliGRAM(s) IntraMuscular once PRN extreme eps  haloperidol     Tablet 5 milliGRAM(s) Oral Once PRN agitation secondary to psychosis  haloperidol    Injectable 5 milliGRAM(s) IntraMuscular Once PRN extreme agitation secondry to psychosis  hydrOXYzine hydrochloride 50 milliGRAM(s) Oral every 6 hours PRN Anxiety  LORazepam   Injectable 2 milliGRAM(s) IntraMuscular once PRN extreme anxiety  melatonin. 3 milliGRAM(s) Oral at bedtime PRN Insomnia  
MEDICATIONS  (PRN):  albuterol    90 MICROgram(s) HFA Inhaler 2 Puff(s) Inhalation every 6 hours PRN Bronchospasm  diphenhydrAMINE 50 milliGRAM(s) Oral every 6 hours PRN eps  diphenhydrAMINE Injectable 50 milliGRAM(s) IntraMuscular once PRN extreme eps  haloperidol     Tablet 5 milliGRAM(s) Oral Once PRN agitation secondary to psychosis  haloperidol    Injectable 5 milliGRAM(s) IntraMuscular Once PRN extreme agitation secondry to psychosis  hydrOXYzine hydrochloride 50 milliGRAM(s) Oral every 6 hours PRN Anxiety  LORazepam   Injectable 2 milliGRAM(s) IntraMuscular once PRN extreme anxiety  melatonin. 3 milliGRAM(s) Oral at bedtime PRN Insomnia  
MEDICATIONS  (PRN):  acetaminophen     Tablet .. 650 milliGRAM(s) Oral every 6 hours PRN Moderate Pain (4 - 6)  albuterol    90 MICROgram(s) HFA Inhaler 2 Puff(s) Inhalation every 6 hours PRN Bronchospasm  benztropine 1 milliGRAM(s) Oral at bedtime PRN eps  diphenhydrAMINE 50 milliGRAM(s) Oral every 6 hours PRN eps  diphenhydrAMINE Injectable 50 milliGRAM(s) IntraMuscular once PRN extreme eps  haloperidol     Tablet 5 milliGRAM(s) Oral Once PRN agitation secondary to psychosis  haloperidol    Injectable 5 milliGRAM(s) IntraMuscular Once PRN extreme agitation secondry to psychosis  hydrOXYzine hydrochloride 50 milliGRAM(s) Oral every 6 hours PRN Anxiety  LORazepam   Injectable 2 milliGRAM(s) IntraMuscular once PRN extreme anxiety  melatonin. 3 milliGRAM(s) Oral at bedtime PRN Insomnia  
MEDICATIONS  (PRN):  albuterol    90 MICROgram(s) HFA Inhaler 2 Puff(s) Inhalation every 6 hours PRN Bronchospasm  diphenhydrAMINE 50 milliGRAM(s) Oral every 6 hours PRN eps  diphenhydrAMINE Injectable 50 milliGRAM(s) IntraMuscular once PRN extreme eps  haloperidol     Tablet 5 milliGRAM(s) Oral Once PRN agitation secondary to psychosis  haloperidol    Injectable 5 milliGRAM(s) IntraMuscular Once PRN extreme agitation secondry to psychosis  hydrOXYzine hydrochloride 50 milliGRAM(s) Oral every 6 hours PRN Anxiety  LORazepam     Tablet 2 milliGRAM(s) Oral every 2 hours PRN CIWA score increase by 2 points and current CIWA score GREATER THAN 9  LORazepam   Injectable 2 milliGRAM(s) IntraMuscular once PRN extreme anxiety  melatonin. 3 milliGRAM(s) Oral at bedtime PRN Insomnia  
MEDICATIONS  (PRN):  acetaminophen     Tablet .. 650 milliGRAM(s) Oral every 6 hours PRN Moderate Pain (4 - 6)  albuterol    90 MICROgram(s) HFA Inhaler 2 Puff(s) Inhalation every 6 hours PRN Bronchospasm  benztropine 1 milliGRAM(s) Oral at bedtime PRN eps  diphenhydrAMINE 50 milliGRAM(s) Oral every 6 hours PRN eps  diphenhydrAMINE Injectable 50 milliGRAM(s) IntraMuscular once PRN extreme eps  haloperidol     Tablet 5 milliGRAM(s) Oral Once PRN agitation secondary to psychosis  haloperidol    Injectable 5 milliGRAM(s) IntraMuscular Once PRN extreme agitation secondry to psychosis  hydrOXYzine hydrochloride 50 milliGRAM(s) Oral every 6 hours PRN Anxiety  LORazepam   Injectable 2 milliGRAM(s) IntraMuscular once PRN extreme anxiety  melatonin. 3 milliGRAM(s) Oral at bedtime PRN Insomnia  
MEDICATIONS  (PRN):  acetaminophen     Tablet .. 650 milliGRAM(s) Oral every 6 hours PRN Moderate Pain (4 - 6)  albuterol    90 MICROgram(s) HFA Inhaler 2 Puff(s) Inhalation every 6 hours PRN Bronchospasm  benztropine 1 milliGRAM(s) Oral at bedtime PRN eps  diphenhydrAMINE 50 milliGRAM(s) Oral every 6 hours PRN eps  diphenhydrAMINE Injectable 50 milliGRAM(s) IntraMuscular once PRN extreme eps  haloperidol     Tablet 5 milliGRAM(s) Oral Once PRN agitation secondary to psychosis  haloperidol    Injectable 5 milliGRAM(s) IntraMuscular Once PRN extreme agitation secondry to psychosis  hydrOXYzine hydrochloride 50 milliGRAM(s) Oral every 6 hours PRN Anxiety  LORazepam   Injectable 2 milliGRAM(s) IntraMuscular once PRN extreme anxiety  melatonin. 3 milliGRAM(s) Oral at bedtime PRN Insomnia  
MEDICATIONS  (PRN):  albuterol    90 MICROgram(s) HFA Inhaler 2 Puff(s) Inhalation every 6 hours PRN Bronchospasm  diphenhydrAMINE 50 milliGRAM(s) Oral every 6 hours PRN eps  diphenhydrAMINE Injectable 50 milliGRAM(s) IntraMuscular once PRN extreme eps  haloperidol     Tablet 5 milliGRAM(s) Oral Once PRN agitation secondary to psychosis  haloperidol    Injectable 5 milliGRAM(s) IntraMuscular Once PRN extreme agitation secondry to psychosis  hydrOXYzine hydrochloride 50 milliGRAM(s) Oral every 6 hours PRN Anxiety  LORazepam     Tablet 2 milliGRAM(s) Oral every 2 hours PRN CIWA score increase by 2 points and current CIWA score GREATER THAN 9  LORazepam   Injectable 2 milliGRAM(s) IntraMuscular once PRN extreme anxiety  melatonin. 3 milliGRAM(s) Oral at bedtime PRN Insomnia  
MEDICATIONS  (PRN):  acetaminophen     Tablet .. 650 milliGRAM(s) Oral every 6 hours PRN Moderate Pain (4 - 6)  albuterol    90 MICROgram(s) HFA Inhaler 2 Puff(s) Inhalation every 6 hours PRN Bronchospasm  benztropine 1 milliGRAM(s) Oral at bedtime PRN eps  diphenhydrAMINE 50 milliGRAM(s) Oral every 6 hours PRN eps  diphenhydrAMINE Injectable 50 milliGRAM(s) IntraMuscular once PRN extreme eps  haloperidol     Tablet 5 milliGRAM(s) Oral Once PRN agitation secondary to psychosis  haloperidol    Injectable 5 milliGRAM(s) IntraMuscular Once PRN extreme agitation secondry to psychosis  hydrOXYzine hydrochloride 50 milliGRAM(s) Oral every 6 hours PRN Anxiety  LORazepam   Injectable 2 milliGRAM(s) IntraMuscular once PRN extreme anxiety  melatonin. 3 milliGRAM(s) Oral at bedtime PRN Insomnia  
MEDICATIONS  (PRN):  albuterol    90 MICROgram(s) HFA Inhaler 2 Puff(s) Inhalation every 6 hours PRN Bronchospasm  diphenhydrAMINE 50 milliGRAM(s) Oral every 6 hours PRN eps  diphenhydrAMINE Injectable 50 milliGRAM(s) IntraMuscular once PRN extreme eps  haloperidol     Tablet 5 milliGRAM(s) Oral Once PRN agitation secondary to psychosis  haloperidol    Injectable 5 milliGRAM(s) IntraMuscular Once PRN extreme agitation secondry to psychosis  hydrOXYzine hydrochloride 50 milliGRAM(s) Oral every 6 hours PRN Anxiety  LORazepam     Tablet 2 milliGRAM(s) Oral every 2 hours PRN CIWA score increase by 2 points and current CIWA score GREATER THAN 9  LORazepam   Injectable 2 milliGRAM(s) IntraMuscular once PRN extreme anxiety  
MEDICATIONS  (PRN):  acetaminophen     Tablet .. 650 milliGRAM(s) Oral every 6 hours PRN Moderate Pain (4 - 6)  albuterol    90 MICROgram(s) HFA Inhaler 2 Puff(s) Inhalation every 6 hours PRN Bronchospasm  benztropine 1 milliGRAM(s) Oral at bedtime PRN eps  diphenhydrAMINE 50 milliGRAM(s) Oral every 6 hours PRN eps  diphenhydrAMINE Injectable 50 milliGRAM(s) IntraMuscular once PRN extreme eps  haloperidol     Tablet 5 milliGRAM(s) Oral Once PRN agitation secondary to psychosis  haloperidol    Injectable 5 milliGRAM(s) IntraMuscular Once PRN extreme agitation secondry to psychosis  hydrOXYzine hydrochloride 50 milliGRAM(s) Oral every 6 hours PRN Anxiety  LORazepam   Injectable 2 milliGRAM(s) IntraMuscular once PRN extreme anxiety  melatonin. 3 milliGRAM(s) Oral at bedtime PRN Insomnia  
MEDICATIONS  (PRN):  acetaminophen     Tablet .. 650 milliGRAM(s) Oral every 6 hours PRN Moderate Pain (4 - 6)  albuterol    90 MICROgram(s) HFA Inhaler 2 Puff(s) Inhalation every 6 hours PRN Bronchospasm  benztropine 1 milliGRAM(s) Oral at bedtime PRN eps  diphenhydrAMINE 50 milliGRAM(s) Oral every 6 hours PRN eps  diphenhydrAMINE Injectable 50 milliGRAM(s) IntraMuscular once PRN extreme eps  haloperidol     Tablet 5 milliGRAM(s) Oral Once PRN agitation secondary to psychosis  haloperidol    Injectable 5 milliGRAM(s) IntraMuscular Once PRN extreme agitation secondry to psychosis  hydrOXYzine hydrochloride 50 milliGRAM(s) Oral every 6 hours PRN Anxiety  LORazepam   Injectable 2 milliGRAM(s) IntraMuscular once PRN extreme anxiety  melatonin. 3 milliGRAM(s) Oral at bedtime PRN Insomnia

## 2023-01-04 NOTE — BH INPATIENT PSYCHIATRY PROGRESS NOTE - NS ED [facility] REVIEW OF ED CHART AVAILABLE LABS REVIEWED
Yes
None available

## 2023-01-04 NOTE — BH INPATIENT PSYCHIATRY PROGRESS NOTE - NSCGIIMPROVESX_PSY_ALL_CORE
2 = Much improved - notably better with signficant reduction of symptoms; increase in the level of functioning but some symptoms remain
3 = Minimally improved - slightly better with little or no clinically meaningful reduction of symptoms.  Represents very little change in basic clinical status, level of care, or functional capacity.
2 = Much improved - notably better with signficant reduction of symptoms; increase in the level of functioning but some symptoms remain
2 = Much improved - notably better with signficant reduction of symptoms; increase in the level of functioning but some symptoms remain
3 = Minimally improved - slightly better with little or no clinically meaningful reduction of symptoms.  Represents very little change in basic clinical status, level of care, or functional capacity.
3 = Minimally improved - slightly better with little or no clinically meaningful reduction of symptoms.  Represents very little change in basic clinical status, level of care, or functional capacity.
2 = Much improved - notably better with signficant reduction of symptoms; increase in the level of functioning but some symptoms remain

## 2023-01-13 ENCOUNTER — OUTPATIENT (OUTPATIENT)
Dept: OUTPATIENT SERVICES | Facility: HOSPITAL | Age: 53
LOS: 1 days | Discharge: STILL ADMITTED OR ADMINISTRATIVE | End: 2023-01-13

## 2023-01-13 DIAGNOSIS — F33.3 MAJOR DEPRESSIVE DISORDER, RECURRENT, SEVERE WITH PSYCHOTIC SYMPTOMS: ICD-10-CM

## 2023-03-02 NOTE — BH INPATIENT PSYCHIATRY DISCHARGE NOTE - NSBHDCHANDOFFVIA_PSY_ALL_CORE
Physical Therapy Visit    Visit Type: Daily Treatment Note  Visit: 4  Referring Provider: Kale Martell MD  Medical Diagnosis (from order): V43.65 (ICD-9-CM) - Z96.659 (ICD-10-CM) - S/P total knee replacement    SUBJECTIVE                                                                                                               Patient is 2 weeks 2 days status post left total knee arthroplasty on 2/14/23. Patient had her check-up yesterday and notes they said everything looked good. Patient notes her knee has been feeling a little better/looser.   Functional Change: Patient has been walking more without an assistive device    Pain / Symptoms  - Pain rating (out of 10): Current: 3 ; Best: 3; Worst: 7  - Location: Right knee         OBJECTIVE                                                                                                                       Range of Motion (ROM)   (degrees unless noted; active unless noted; norms in ( ); negative=lacking to 0, positive=beyond 0)  Knee:   - Flexion (150):      • Left:  112    - Extension (0-10):      • Left:  -3   Comments: Left knee flexion measured in sitting (in supine was 100 degrees)  Left knee extension measured in supine                                                       Treatment       Therapeutic Exercise  Recumbent bike seat 13, level 1  -able to go all the way around with seat back from 11  - x 4 minutes  Quad sets  - 1 x 10 with 5 second holds, left   Supine knee extension stretch with ankle on foam roller  - 1 x 10 with 10 second holds, left   - patient gets to -3 degrees  Short arc quads  -1 x 10 with 5 second holds, left   Straight leg raise   -1 x 10, left   Passive left knee flexion stretch  Supine heel slides with board and strap  - 1 x 10, left   - patient gets to 100 degrees  Bridges   - 1 x 10  Seated heel slide  - 1 x 10, left   - patient gets to 112 degrees   Seated knee extension   -1 x 10 with 5 second holds, left   Sit to stand from  22 inch surface with arms out front  - 1 x 10  Standing gastrocnemius stretch with foam roller against wall  - 4 x 15 seconds, left   4 inch forward step ups with left foot and one hand support  - 1 x 10  Lateral 4 inch step ups (up/down to the right, up/down to the left) with upper extremity support   - 1 x 10  6 inch forward step ups with left foot and one hand support to no hand support  - 1 x 10  Lateral 6 inch step ups (up/down to the right, up/down to the left) with upper extremity support   - 1 x 10    Manual Therapy   Myofascial release / soft tissue mobilization to left posterior knee   Gentle left patellar mobilizations in superior/inferior and medial/lateral directions  Gentle grade I-II left knee extension mobilization in supine with towel roll under knee     Skilled input: verbal instruction/cues and tactile instruction/cues  education/instruction on: tennis ball release for posterior knee. Also went over how to increase extension stretch by adding weight to the knee.     Writer verbally educated and received verbal consent for hand placement, positioning of patient, and techniques to be performed today from patient for hand placement and palpation for techniques and clothing adjustments for techniques as described above and how they are pertinent to the patient's plan of care.    Home Exercise Program  Access Code: [email or web address]  URL: [email or web address]  Date: 02/21/2023  Prepared by: Lenore Barba    Exercises  Supine Ankle Pumps - 5 x daily - 7 x weekly - 3 sets - 10 reps  Supine Quad Set - 3 x daily - 7 x weekly - 1-3 sets - 10 reps - 5 hold  Supine Short Arc Quad - 2-3 x daily - 7 x weekly - 1-3 sets - 10 reps - 5 hold  Small Range Straight Leg Raise - 2-3 x daily - 7 x weekly - 1-3 sets - 10 reps  Supine Knee Extension Stretch on Towel Roll - 3 x daily - 7 x weekly - 1-3 sets - 10 reps - 10 hold  Supine Heel Slide with Strap - 3 x daily - 7 x weekly - 1-3 sets - 10  reps  Seated Heel Slide - 3 x daily - 7 x weekly - 3 sets - 10 reps  Heel Toe Raises with Counter Support - 1-2 x daily - 7 x weekly - 1-3 sets - 10 reps  Standing Knee Flexion - 1-2 x daily - 7 x weekly - 1-3 sets - 10 reps  Standing Hip Abduction with Counter Support - 1-2 x daily - 7 x weekly - 1-3 sets - 10 reps  Standing March with Counter Support - 1-2 x daily - 7 x weekly - 1-3 sets - 10 reps          ASSESSMENT                                                                                                            Patient continues to be progressing nicely. Her left knee extension improved from -5 degrees to -3 degrees. Her left knee flexion in supine improved from 97 to 100 degrees, but she has the most improvement in sitting from 102 to 112 degrees of left knee flexion today. Progressed with step ups with the left foot and patient did well with the 4 inch step. We then increased to the 6 inch step and she did well with it, but it was more challenging. Patient's pain has been better today.   Pain/symptoms after session (out of 10): 4  Education:   - Results of above outlined education: Verbalizes understanding and Demonstrates understanding    PLAN                                                                                                                           Suggestions for next session as indicated: Progress per plan of care following post total knee arthroplasty protocol in order to decrease pain and swelling and improve range of motion, strength, balance and gait.           Therapy procedure time and total treatment time can be found documented on the Time Entry flowsheet     Email

## 2023-03-24 ENCOUNTER — APPOINTMENT (OUTPATIENT)
Dept: INTERNAL MEDICINE | Facility: CLINIC | Age: 53
End: 2023-03-24
Payer: MEDICARE

## 2023-03-24 ENCOUNTER — NON-APPOINTMENT (OUTPATIENT)
Age: 53
End: 2023-03-24

## 2023-03-24 VITALS
OXYGEN SATURATION: 98 % | TEMPERATURE: 98.3 F | BODY MASS INDEX: 33.8 KG/M2 | DIASTOLIC BLOOD PRESSURE: 69 MMHG | SYSTOLIC BLOOD PRESSURE: 104 MMHG | HEIGHT: 64 IN | HEART RATE: 121 BPM | WEIGHT: 198 LBS

## 2023-03-24 DIAGNOSIS — Z86.32 PERSONAL HISTORY OF GESTATIONAL DIABETES: ICD-10-CM

## 2023-03-24 DIAGNOSIS — E53.8 DEFICIENCY OF OTHER SPECIFIED B GROUP VITAMINS: ICD-10-CM

## 2023-03-24 DIAGNOSIS — K21.9 GASTRO-ESOPHAGEAL REFLUX DISEASE W/OUT ESOPHAGITIS: ICD-10-CM

## 2023-03-24 DIAGNOSIS — E61.1 IRON DEFICIENCY: ICD-10-CM

## 2023-03-24 DIAGNOSIS — Z63.5 DISRUPTION OF FAMILY BY SEPARATION AND DIVORCE: ICD-10-CM

## 2023-03-24 DIAGNOSIS — R51.9 HEADACHE, UNSPECIFIED: ICD-10-CM

## 2023-03-24 DIAGNOSIS — M79.672 PAIN IN LEFT FOOT: ICD-10-CM

## 2023-03-24 DIAGNOSIS — Z01.419 ENCOUNTER FOR GYNECOLOGICAL EXAMINATION (GENERAL) (ROUTINE) W/OUT ABNORMAL FINDINGS: ICD-10-CM

## 2023-03-24 DIAGNOSIS — Z11.3 ENCOUNTER FOR SCREENING FOR INFECTIONS WITH A PREDOMINANTLY SEXUAL MODE OF TRANSMISSION: ICD-10-CM

## 2023-03-24 DIAGNOSIS — I10 ESSENTIAL (PRIMARY) HYPERTENSION: ICD-10-CM

## 2023-03-24 DIAGNOSIS — Z71.89 OTHER SPECIFIED COUNSELING: ICD-10-CM

## 2023-03-24 DIAGNOSIS — E55.9 VITAMIN D DEFICIENCY, UNSPECIFIED: ICD-10-CM

## 2023-03-24 DIAGNOSIS — G47.30 SLEEP APNEA, UNSPECIFIED: ICD-10-CM

## 2023-03-24 DIAGNOSIS — F32.A DEPRESSION, UNSPECIFIED: ICD-10-CM

## 2023-03-24 PROCEDURE — G0008: CPT

## 2023-03-24 PROCEDURE — 99386 PREV VISIT NEW AGE 40-64: CPT | Mod: 25,GY

## 2023-03-24 PROCEDURE — 90686 IIV4 VACC NO PRSV 0.5 ML IM: CPT

## 2023-03-24 RX ORDER — BENZTROPINE MESYLATE 1 MG/1
1 TABLET ORAL TWICE DAILY
Refills: 0 | Status: ACTIVE | COMMUNITY
Start: 2023-03-24

## 2023-03-24 RX ORDER — SERTRALINE HYDROCHLORIDE 50 MG/1
50 TABLET, FILM COATED ORAL DAILY
Qty: 45 | Refills: 3 | Status: DISCONTINUED | COMMUNITY
Start: 2017-01-12 | End: 2023-03-24

## 2023-03-24 RX ORDER — METOPROLOL TARTRATE 25 MG/1
25 TABLET, FILM COATED ORAL TWICE DAILY
Refills: 0 | Status: ACTIVE | COMMUNITY
Start: 2023-03-24

## 2023-03-24 SDOH — SOCIAL STABILITY - SOCIAL INSECURITY: DISRUPTION OF FAMILY BY SEPARATION AND DIVORCE: Z63.5

## 2023-04-10 RX ORDER — ALBUTEROL SULFATE 90 UG/1
108 (90 BASE) INHALANT RESPIRATORY (INHALATION)
Qty: 1 | Refills: 11 | Status: ACTIVE | COMMUNITY
Start: 2017-09-05 | End: 1900-01-01

## 2023-04-10 NOTE — ASSESSMENT
[FreeTextEntry1] : Assessment as indicated above in impressions with plans through orders below. Lab orders placed, serum being drawn in office today.

## 2023-04-10 NOTE — PHYSICAL EXAM
[No Acute Distress] : no acute distress [Normal Voice/Communication] : normal voice/communication [Normal Sclera/Conjunctiva] : normal sclera/conjunctiva [Normal TMs] : both tympanic membranes were normal [No Edema] : there was no peripheral edema [Deep Tendon Reflexes (DTR)] : deep tendon reflexes were 2+ and symmetric [Normal] : affect was normal and insight and judgment were intact

## 2023-04-10 NOTE — HEALTH RISK ASSESSMENT
[Other reason not done] : Other reason not done [I have developed a follow-up plan documented below in the note.] : I have developed a follow-up plan documented below in the note. [Patient reported mammogram was normal] : Patient reported mammogram was normal [Patient reported PAP Smear was normal] : Patient reported PAP Smear was normal [] :  [Feels Safe at Home] : Feels safe at home [Smoke Detector] : smoke detector [Carbon Monoxide Detector] : carbon monoxide detector [Seat Belt] :  uses seat belt [de-identified] : under care of psychiatry [Reports changes in hearing] : Reports no changes in hearing [Reports changes in vision] : Reports no changes in vision [Reports changes in dental health] : Reports no changes in dental health [Guns at Home] : no guns at home [MammogramDate] : 03/22 [PapSmearDate] : 01/21

## 2023-04-10 NOTE — HISTORY OF PRESENT ILLNESS
[FreeTextEntry1] : annual check up  [de-identified] : Patient presents for annual check up, had last seen her primary care provider in 2018 and is coming now for an updated physical.  Patient wanted to be seen soon due to having a lot of issues.\par \par Depression: continues seeing behavioral health and is prescribed cogentin .1mg twice daily, nvega shot.  \par \par Cardiologist: reports having had CHF in 2017, had a stress test done 2 days ago, which is done yearly with Cardiology.  Currently doing well for most part, not taking any furosemide.  \par \par Health maintenance: last pap was 2 years ago and normal, mammogram in [place] of 2022 was normal.  Reports having had a right breast biopsy in the past. Needs a mammogram scheduled again. \par \par Continues on having her menses, last one lasting for about 3 weeks, reports no menses this year so far. \par \par SH: Mother of 4 children.

## 2023-04-13 ENCOUNTER — APPOINTMENT (OUTPATIENT)
Dept: INTERNAL MEDICINE | Facility: CLINIC | Age: 53
End: 2023-04-13
Payer: MEDICARE

## 2023-04-13 ENCOUNTER — APPOINTMENT (OUTPATIENT)
Dept: INTERNAL MEDICINE | Facility: CLINIC | Age: 53
End: 2023-04-13

## 2023-04-13 DIAGNOSIS — Z23 ENCOUNTER FOR IMMUNIZATION: ICD-10-CM

## 2023-04-13 DIAGNOSIS — D50.9 IRON DEFICIENCY ANEMIA, UNSPECIFIED: ICD-10-CM

## 2023-04-13 DIAGNOSIS — R73.03 PREDIABETES.: ICD-10-CM

## 2023-04-13 LAB
25(OH)D3 SERPL-MCNC: 26.7 NG/ML
ALBUMIN SERPL ELPH-MCNC: 3.9 G/DL
ALP BLD-CCNC: 82 U/L
ALT SERPL-CCNC: 13 U/L
ANION GAP SERPL CALC-SCNC: 10 MMOL/L
AST SERPL-CCNC: 21 U/L
BASOPHILS # BLD AUTO: 0.04 K/UL
BASOPHILS NFR BLD AUTO: 0.6 %
BILIRUB SERPL-MCNC: 0.2 MG/DL
BUN SERPL-MCNC: 9 MG/DL
CALCIUM SERPL-MCNC: 9.1 MG/DL
CHLORIDE SERPL-SCNC: 106 MMOL/L
CHOLEST SERPL-MCNC: 156 MG/DL
CO2 SERPL-SCNC: 24 MMOL/L
CREAT SERPL-MCNC: 0.7 MG/DL
EGFR: 105 ML/MIN/1.73M2
EOSINOPHIL # BLD AUTO: 0.14 K/UL
EOSINOPHIL NFR BLD AUTO: 2 %
ESTIMATED AVERAGE GLUCOSE: 128 MG/DL
GLUCOSE SERPL-MCNC: 92 MG/DL
HBA1C MFR BLD HPLC: 6.1 %
HBV CORE IGG+IGM SER QL: NONREACTIVE
HBV SURFACE AB SER QL: NONREACTIVE
HCT VFR BLD CALC: 31.9 %
HCV AB SER QL: NONREACTIVE
HCV S/CO RATIO: 0.12 S/CO
HDLC SERPL-MCNC: 43 MG/DL
HGB BLD-MCNC: 9.4 G/DL
HIV1+2 AB SPEC QL IA.RAPID: NONREACTIVE
IMM GRANULOCYTES NFR BLD AUTO: 0.1 %
LDLC SERPL CALC-MCNC: 80 MG/DL
LYMPHOCYTES # BLD AUTO: 2.08 K/UL
LYMPHOCYTES NFR BLD AUTO: 30 %
MAN DIFF?: NORMAL
MCHC RBC-ENTMCNC: 21.2 PG
MCHC RBC-ENTMCNC: 29.5 GM/DL
MCV RBC AUTO: 71.8 FL
MONOCYTES # BLD AUTO: 0.65 K/UL
MONOCYTES NFR BLD AUTO: 9.4 %
NEUTROPHILS # BLD AUTO: 4.02 K/UL
NEUTROPHILS NFR BLD AUTO: 57.9 %
NONHDLC SERPL-MCNC: 113 MG/DL
PLATELET # BLD AUTO: 441 K/UL
POTASSIUM SERPL-SCNC: 4 MMOL/L
PROT SERPL-MCNC: 7.3 G/DL
RBC # BLD: 4.44 M/UL
RBC # FLD: 17.6 %
SODIUM SERPL-SCNC: 140 MMOL/L
TRIGL SERPL-MCNC: 164 MG/DL
TSH SERPL-ACNC: 0.87 UIU/ML
WBC # FLD AUTO: 6.94 K/UL

## 2023-04-13 PROCEDURE — 99442: CPT

## 2023-04-14 PROBLEM — D50.9 MICROCYTIC ANEMIA: Status: ACTIVE | Noted: 2023-04-13

## 2023-04-14 PROBLEM — Z23 ENCOUNTER FOR IMMUNIZATION: Status: RESOLVED | Noted: 2023-03-24 | Resolved: 2023-04-14

## 2023-04-14 NOTE — PHYSICAL EXAM
[No Acute Distress] : no acute distress [Normal Voice/Communication] : normal voice/communication [No Respiratory Distress] : no respiratory distress  [de-identified] : Visit performed telephonically hence exam is limited and based on auditory cues.

## 2023-04-14 NOTE — ASSESSMENT
[FreeTextEntry1] : Assessment as indicated above in impressions with plans through orders below. Lab orders placed, serum being drawn in future prior to follow up.

## 2023-04-14 NOTE — HISTORY OF PRESENT ILLNESS
[Home] : at home, [unfilled] , at the time of the visit. [Medical Office: ([facility])___] : at the medical office located in  [Verbal consent obtained from patient] : the patient, [unfilled] [FreeTextEntry1] : follow up on abnormal results [de-identified] : Patient recently seen for an annual visit and was found to have anemia.  Patient amenable to having TEB visit that is scheduled end of April moved up today through telephonic encounter.  Reviewed with patient low blood count found at annual.  Patient reports that she was having heavy menses in December but which had resolved by a month later.  Patient is perimenopausal and reports hasn't had any heavy menses since the hospitalization in December. \par \par Patient reports she is doing well for the most part, without any shortness of breath or palpitations.

## 2023-04-28 ENCOUNTER — APPOINTMENT (OUTPATIENT)
Dept: INTERNAL MEDICINE | Facility: CLINIC | Age: 53
End: 2023-04-28

## 2023-05-15 DIAGNOSIS — Z12.39 ENCOUNTER FOR OTHER SCREENING FOR MALIGNANT NEOPLASM OF BREAST: ICD-10-CM

## 2023-05-17 ENCOUNTER — APPOINTMENT (OUTPATIENT)
Dept: PULMONOLOGY | Facility: CLINIC | Age: 53
End: 2023-05-17
Payer: MEDICARE

## 2023-05-17 VITALS
RESPIRATION RATE: 15 BRPM | OXYGEN SATURATION: 94 % | WEIGHT: 201.38 LBS | TEMPERATURE: 97 F | DIASTOLIC BLOOD PRESSURE: 72 MMHG | SYSTOLIC BLOOD PRESSURE: 104 MMHG | BODY MASS INDEX: 34.38 KG/M2 | HEIGHT: 64 IN | HEART RATE: 86 BPM

## 2023-05-17 PROCEDURE — ZZZZZ: CPT

## 2023-05-17 PROCEDURE — 94729 DIFFUSING CAPACITY: CPT

## 2023-05-17 PROCEDURE — 94060 EVALUATION OF WHEEZING: CPT

## 2023-05-17 PROCEDURE — 94726 PLETHYSMOGRAPHY LUNG VOLUMES: CPT

## 2023-05-17 PROCEDURE — 99203 OFFICE O/P NEW LOW 30 MIN: CPT | Mod: 25

## 2023-05-17 NOTE — CONSULT LETTER
[Consult Letter:] : I had the pleasure of evaluating your patient, [unfilled]. [FreeTextEntry2] : Dr. Víctor Styles

## 2023-05-17 NOTE — ASSESSMENT
[FreeTextEntry1] : Plan:\par \par -Sleep Study Ordered, pt will be notified of results\par -PFT ordered, pt will be notified of results\par -Notify office immediately of any S/S of acute respiratory distress \par \par \par \par \par \par \par \par Attending:\par Agree with above.  Patient was last seen in 2017.  She then moved to the [place] and Transferred her care there.  She now lives in [place] but prefers to come here for her care.\par She has known obstructive sleep apnea, previous titration in 2017 actually required bilevel.  However she no longer even knows where the machine is and therefore requires a new study she is still very symptomatic.  Split study ordered\par Patient also has a history of cardiomyopathy.  She recently had an abnormal stress test and is going to be scheduled for cath.\par She carries an asthma dx, but prior PFT have been normal. \par PFT today again shows no evidence of asthma. Rather restriction related to body habitus\par Would not add any asthma meds\par Proceed with cardiac work up.\par Diet, weight loss, Sleep study

## 2023-05-17 NOTE — REASON FOR VISIT
[Follow-Up] : a follow-up visit [Asthma] : asthma [Sleep Apnea] : sleep apnea [Wheezing] : wheezing [Cough] : cough [Shortness of Breath] : shortness of breath

## 2023-05-19 NOTE — DISCUSSION/SUMMARY
[FreeTextEntry1] : Plan:\par \par -PFT done today, normal finding\par -Split Sleep Study ordered\par -Notify office immediately of any S/S of acute respiratory distress \par

## 2023-05-19 NOTE — REVIEW OF SYSTEMS
[Cough] : cough [Wheezing] : wheezing [SOB on Exertion] : sob on exertion [Edema] : edema [Seasonal Allergies] : seasonal allergies [Negative] : Neurologic [Obesity] : obesity [Hemoptysis] : no hemoptysis [Chest Tightness] : no chest tightness [Frequent URIs] : no frequent URIs [Sputum] : no sputum [Dyspnea] : no dyspnea [Pleuritic Pain] : no pleuritic pain [A.M. Dry Mouth] : no a.m. dry mouth [Chest Discomfort] : no chest discomfort [Claudication] : no claudication [Leg Cramps] : no leg cramps [Orthopnea] : no orthopnea [Palpitations] : no palpitations [Phlebitis] : no phlebitis [PND] : no PND [Syncope] : no syncope [Hay Fever] : no hay fever [Watery Eyes] : no watery eyes [Itchy Eyes] : no itchy eyes [Nasal Discharge] : no nasal discharge [Hives] : no hives [Angioedema] : no angioedema [Immunocompromised] : not immunocompromised [TextBox_44] : Reports B/LL edema occasionally [TextBox_137] : On Invega Sustenna

## 2023-05-19 NOTE — HISTORY OF PRESENT ILLNESS
[Never] : never [Obstructive Sleep Apnea] : obstructive sleep apnea [TextBox_4] : Pt is a 54y/o F with a History of CHF, Asthma, and MARGARETH who presents today for F/U.\par \par Previously followed by practice, she moved to the [place], has moved back to [place] and has

## 2023-05-19 NOTE — HISTORY OF PRESENT ILLNESS
[Never] : never [Obstructive Sleep Apnea] : obstructive sleep apnea [TextBox_4] : Pt is a 52y/o F with a History of CHF, Asthma, and MARGARETH who presents today for F/U.\par \par Previously followed by practice, she moved to the [place], has moved back to [place] and has

## 2023-05-19 NOTE — REVIEW OF SYSTEMS
[Cough] : cough [Wheezing] : wheezing [SOB on Exertion] : sob on exertion [Edema] : edema [Seasonal Allergies] : seasonal allergies [Negative] : Neurologic [Obesity] : obesity [Hemoptysis] : no hemoptysis [Chest Tightness] : no chest tightness [Frequent URIs] : no frequent URIs [Sputum] : no sputum [Dyspnea] : no dyspnea [Pleuritic Pain] : no pleuritic pain [A.M. Dry Mouth] : no a.m. dry mouth [Chest Discomfort] : no chest discomfort [Claudication] : no claudication [Leg Cramps] : no leg cramps [Palpitations] : no palpitations [Orthopnea] : no orthopnea [Phlebitis] : no phlebitis [PND] : no PND [Syncope] : no syncope [Hay Fever] : no hay fever [Watery Eyes] : no watery eyes [Itchy Eyes] : no itchy eyes [Nasal Discharge] : no nasal discharge [Hives] : no hives [Angioedema] : no angioedema [Immunocompromised] : not immunocompromised [TextBox_44] : Reports B/LL edema occasionally [TextBox_137] : On Invega Sustenna

## 2023-05-26 ENCOUNTER — TRANSCRIPTION ENCOUNTER (OUTPATIENT)
Age: 53
End: 2023-05-26

## 2023-05-26 ENCOUNTER — OUTPATIENT (OUTPATIENT)
Dept: OUTPATIENT SERVICES | Facility: HOSPITAL | Age: 53
LOS: 1 days | End: 2023-05-26
Payer: COMMERCIAL

## 2023-05-26 VITALS
SYSTOLIC BLOOD PRESSURE: 111 MMHG | TEMPERATURE: 98 F | OXYGEN SATURATION: 95 % | RESPIRATION RATE: 16 BRPM | DIASTOLIC BLOOD PRESSURE: 59 MMHG | HEART RATE: 72 BPM | HEIGHT: 63 IN | WEIGHT: 197.09 LBS

## 2023-05-26 VITALS
HEART RATE: 67 BPM | OXYGEN SATURATION: 98 % | DIASTOLIC BLOOD PRESSURE: 56 MMHG | RESPIRATION RATE: 16 BRPM | SYSTOLIC BLOOD PRESSURE: 101 MMHG

## 2023-05-26 DIAGNOSIS — R94.39 ABNORMAL RESULT OF OTHER CARDIOVASCULAR FUNCTION STUDY: ICD-10-CM

## 2023-05-26 LAB
ANION GAP SERPL CALC-SCNC: 12 MMOL/L — SIGNIFICANT CHANGE UP (ref 5–17)
BUN SERPL-MCNC: 12 MG/DL — SIGNIFICANT CHANGE UP (ref 7–23)
CALCIUM SERPL-MCNC: 8.9 MG/DL — SIGNIFICANT CHANGE UP (ref 8.4–10.5)
CHLORIDE SERPL-SCNC: 104 MMOL/L — SIGNIFICANT CHANGE UP (ref 96–108)
CO2 SERPL-SCNC: 23 MMOL/L — SIGNIFICANT CHANGE UP (ref 22–31)
CREAT SERPL-MCNC: 0.71 MG/DL — SIGNIFICANT CHANGE UP (ref 0.5–1.3)
EGFR: 103 ML/MIN/1.73M2 — SIGNIFICANT CHANGE UP
GLUCOSE SERPL-MCNC: 107 MG/DL — HIGH (ref 70–99)
HCG SERPL-ACNC: <2 MIU/ML — SIGNIFICANT CHANGE UP
HCT VFR BLD CALC: 30.1 % — LOW (ref 34.5–45)
HGB BLD-MCNC: 8.9 G/DL — LOW (ref 11.5–15.5)
MCHC RBC-ENTMCNC: 21 PG — LOW (ref 27–34)
MCHC RBC-ENTMCNC: 29.6 GM/DL — LOW (ref 32–36)
MCV RBC AUTO: 71 FL — LOW (ref 80–100)
NRBC # BLD: 0 /100 WBCS — SIGNIFICANT CHANGE UP (ref 0–0)
PLATELET # BLD AUTO: 402 K/UL — HIGH (ref 150–400)
POTASSIUM SERPL-MCNC: 4.1 MMOL/L — SIGNIFICANT CHANGE UP (ref 3.5–5.3)
POTASSIUM SERPL-SCNC: 4.1 MMOL/L — SIGNIFICANT CHANGE UP (ref 3.5–5.3)
RBC # BLD: 4.24 M/UL — SIGNIFICANT CHANGE UP (ref 3.8–5.2)
RBC # FLD: 17.3 % — HIGH (ref 10.3–14.5)
SODIUM SERPL-SCNC: 139 MMOL/L — SIGNIFICANT CHANGE UP (ref 135–145)
WBC # BLD: 7.27 K/UL — SIGNIFICANT CHANGE UP (ref 3.8–10.5)
WBC # FLD AUTO: 7.27 K/UL — SIGNIFICANT CHANGE UP (ref 3.8–10.5)

## 2023-05-26 PROCEDURE — 80048 BASIC METABOLIC PNL TOTAL CA: CPT

## 2023-05-26 PROCEDURE — C1894: CPT

## 2023-05-26 PROCEDURE — 99152 MOD SED SAME PHYS/QHP 5/>YRS: CPT

## 2023-05-26 PROCEDURE — 93010 ELECTROCARDIOGRAM REPORT: CPT

## 2023-05-26 PROCEDURE — 84702 CHORIONIC GONADOTROPIN TEST: CPT

## 2023-05-26 PROCEDURE — 85027 COMPLETE CBC AUTOMATED: CPT

## 2023-05-26 PROCEDURE — 93458 L HRT ARTERY/VENTRICLE ANGIO: CPT

## 2023-05-26 PROCEDURE — C1887: CPT

## 2023-05-26 PROCEDURE — 93458 L HRT ARTERY/VENTRICLE ANGIO: CPT | Mod: 26,59

## 2023-05-26 PROCEDURE — C1769: CPT

## 2023-05-26 PROCEDURE — 93005 ELECTROCARDIOGRAM TRACING: CPT

## 2023-05-26 RX ORDER — FERROUS FUMARATE 350(115)MG
1 TABLET ORAL
Refills: 0 | DISCHARGE

## 2023-05-26 RX ORDER — ALBUTEROL 90 UG/1
2 AEROSOL, METERED ORAL
Refills: 0 | DISCHARGE

## 2023-05-26 NOTE — ASU DISCHARGE PLAN (ADULT/PEDIATRIC) - ASU DC SPECIAL INSTRUCTIONSFT
Wound Care:   the day AFTER your procedure remove bandage GENTLY, and clean using  mild soap and gentle warm, water stream, pat dry. leave OPEN to air. YOU MAY SHOWER   DO NOT apply lotions, creams, ointments, powder, par fumes to your incision site  DO NOT SOAK your site for 1 week ( no baths, no pools, no tubs, etc...)  Check  your groin and /or wrist daily. A small amount of bruising, and soreness are normal    ACTIVITY: for 24 hours   - DO NOT DRIVE  - DO NOT make any important decisions or sign legal documents   - DO NOT operate heavy machineries   - you may resume sexual activity in 48 hours, unless otherwise instructed by your cardiologist     If your procedure was done through the WRIST: for the NEXT 3DAYS:  - avoid pushing, pulling, with that affected wrist   - avoid repeated movement of that hand and wrist ( eg: typing, hammering)  - DO NOT LIFT anything more than 5 lbs     If your procedure was done through the GROIN: for the NEXT 5 DAYS  - Limit climbing stairs, DO NOT soak in bathtub or pool  - no strenuous activities, pushing, pulling, straining  - Do not lift anything 10lbs or heavier     MEDICATION:   take your medications as explained ( see discharge paperwork)   If you received a STENT, you will be taking antiplatelet medications to KEEP YOUR STENT OPEN ( eg: Aspirin, Plavix, Brilinta, Effient, etc).  Take as prescribed DO NOT STOP taking them without consulting with your cardiologist first.     Follow heart healthy diet recommended by your doctor, if you smoke STOP SMOKING ( may call [phone] for center of tobacco control if you need assistance)     CALL your doctor to make appointment in 2 WEEKS     ***CALL YOUR DOCTOR***  if you experience: fever, chills, body aches, or severe pain, swelling, redness, heat or yellow discharge at incision site  If you experience Bleeding or excruciating pain at the procedural site, swelling ( golf ball size) at your procedural site  If you experience CHEST PAIN  If you experience extremity numbness, tingling, temperature change ( of your procedural site)   If you are unable to reach your doctor, you may contact:   -Cardiology Office at [facility] at [phone] or   - [facility] [phone]  - [facility] [phone]

## 2023-05-26 NOTE — ASU DISCHARGE PLAN (ADULT/PEDIATRIC) - NS MD DC FALL RISK RISK
For information on Fall & Injury Prevention, visit: [email or web address] OR  [email or web address] OR  https://www.cdc.gov/steadi/patient.html

## 2023-05-26 NOTE — ASU DISCHARGE PLAN (ADULT/PEDIATRIC) - CARE PROVIDER_API CALL
Víctor Styles  Cardiology  [address] [place] [address], [facility] [address]  [place] [address]  Phone: [phone]  Fax: [phone]  Follow Up Time: 2 weeks

## 2023-05-26 NOTE — H&P CARDIOLOGY - HISTORY OF PRESENT ILLNESS
54 y/o female with a PMHx of asthma, depression, MARGARETH on CPAP, SOB, Hc CHFwho endorsed sharp mid sternal, sharp pain without radiation and pt underwent cardiac evaluation with Dr Styles and reports she had a stress test that was abnormal abd referred for [facility] with possible intervention. Denies diaphoresis, palpitations, nausea, vomiting, peripheral edema, recent weight gain, or syncopal/near syncopal episodes.  No implantable cardiac monitoring device implants.      Lai Styles 54 y/o female with a PMHx of asthma, depression, MARGARETH on CPAP, SOB, Hc CHFwho endorsed sharp mid sternal, sharp pain without radiation and pt underwent cardiac evaluation with Dr Styles and reports she had a stress test that was abnormal abd referred for [facility] with possible intervention. Denies diaphoresis, palpitations, nausea, vomiting, peripheral edema, recent weight gain, or syncopal/near syncopal episodes.  No implantable cardiac monitoring device implants.      Lai Styles

## 2023-06-12 ENCOUNTER — APPOINTMENT (OUTPATIENT)
Dept: GASTROENTEROLOGY | Facility: CLINIC | Age: 53
End: 2023-06-12
Payer: MEDICARE

## 2023-06-12 VITALS
HEART RATE: 96 BPM | BODY MASS INDEX: 34.2 KG/M2 | SYSTOLIC BLOOD PRESSURE: 114 MMHG | HEIGHT: 63 IN | WEIGHT: 193 LBS | OXYGEN SATURATION: 95 % | TEMPERATURE: 98 F | DIASTOLIC BLOOD PRESSURE: 80 MMHG

## 2023-06-12 DIAGNOSIS — Z12.11 ENCOUNTER FOR SCREENING FOR MALIGNANT NEOPLASM OF COLON: ICD-10-CM

## 2023-06-12 PROCEDURE — 99204 OFFICE O/P NEW MOD 45 MIN: CPT

## 2023-06-12 NOTE — PHYSICAL EXAM
[Alert] : alert [Normal Voice/Communication] : normal voice/communication [Healthy Appearing] : healthy appearing [No Acute Distress] : no acute distress [Sclera] : the sclera and conjunctiva were normal [Hearing Threshold Finger Rub Not [place]] : hearing was normal [Normal Lips/Gums] : the lips and gums were normal [Oropharynx] : the oropharynx was normal [Normal Appearance] : the appearance of the neck was normal [No Neck Mass] : no neck mass was observed [No Respiratory Distress] : no respiratory distress [No Acc Muscle Use] : no accessory muscle use [Respiration, Rhythm And Depth] : normal respiratory rhythm and effort [Auscultation Breath Sounds / Voice Sounds] : lungs were clear to auscultation bilaterally [Heart Rate And Rhythm] : heart rate was normal and rhythm regular [Normal S1, S2] : normal S1 and S2 [Murmurs] : no murmurs [None] : no edema [Bowel Sounds] : normal bowel sounds [Abdomen Tenderness] : non-tender [No Masses] : no abdominal mass palpated [Abdomen Soft] : soft [Cervical Lymph Nodes Enlarged Posterior Bilaterally] : no posterior cervical lymphadenopathy [Supraclavicular Lymph Nodes Enlarged Bilaterally] : no supraclavicular lymphadenopathy [Cervical Lymph Nodes Enlarged Anterior Bilaterally] : no anterior cervical lymphadenopathy [No CVA Tenderness] : no CVA  tenderness [No Spinal Tenderness] : no spinal tenderness [Abnormal Walk] : normal gait [No Clubbing, Cyanosis] : no clubbing or cyanosis of the fingernails [Normal Color / Pigmentation] : normal skin color and pigmentation [] : no rash [No Focal Deficits] : no focal deficits [Oriented To Time, Place, And Person] : oriented to person, place, and time

## 2023-06-12 NOTE — ASSESSMENT
[FreeTextEntry1] : 53-year-old  woman with no significant family or past history came for evaluation and scheduling CRC screening and recent finding of iron deficiency anemia.  Most common causes of GI blood loss explained to the patient\par Various  alternative methods explained.  Colonoscopy /EGD and RBA discussed.  Patient was advised to set up the appointment for both upper endoscopy and colonoscopy for further evaluation

## 2023-06-12 NOTE — HISTORY OF PRESENT ILLNESS
[FreeTextEntry1] : SAM RIGGINS 53 year F Hisp white F ( w/o Vignesh Zhou) presents for initial evaluation and consultation for Colorectal cancer screening.Denies any constipation,diarrhea, BPR,melena or unintentional weight loss.Reports having good appetite.  However review of her most recent CBC showed hemoglobin of 9 point with low MCV suggestive of iron deficiency anemia.\par No history of colorectal or any GI cancer in the  close family members.\par No history of any pulmonary disease.  History of congestive heart failure approximately 7- 8 years ago , currently asymptomatic without any pedal edema or shortness of breath.  She is not taking any anticoagulants or antiarrhythmics.  She takes metoprolol for hypertension.\par Never had any colonoscopy in the past.\par

## 2023-07-11 ENCOUNTER — APPOINTMENT (OUTPATIENT)
Dept: INTERNAL MEDICINE | Facility: CLINIC | Age: 53
End: 2023-07-11

## 2023-07-11 ENCOUNTER — NON-APPOINTMENT (OUTPATIENT)
Age: 53
End: 2023-07-11

## 2023-08-06 ENCOUNTER — APPOINTMENT (OUTPATIENT)
Dept: SLEEP CENTER | Facility: CLINIC | Age: 53
End: 2023-08-06
Payer: MEDICARE

## 2023-08-06 ENCOUNTER — OUTPATIENT (OUTPATIENT)
Dept: OUTPATIENT SERVICES | Facility: HOSPITAL | Age: 53
LOS: 1 days | End: 2023-08-06
Payer: COMMERCIAL

## 2023-08-06 PROCEDURE — 95811 POLYSOM 6/>YRS CPAP 4/> PARM: CPT

## 2023-08-06 PROCEDURE — 95811 POLYSOM 6/>YRS CPAP 4/> PARM: CPT | Mod: 26

## 2023-08-17 DIAGNOSIS — G47.33 OBSTRUCTIVE SLEEP APNEA (ADULT) (PEDIATRIC): ICD-10-CM

## 2023-10-23 ENCOUNTER — APPOINTMENT (OUTPATIENT)
Dept: GASTROENTEROLOGY | Facility: AMBULATORY MEDICAL SERVICES | Age: 53
End: 2023-10-23
Payer: MEDICARE

## 2023-10-23 PROCEDURE — 45380 COLONOSCOPY AND BIOPSY: CPT

## 2023-11-07 ENCOUNTER — APPOINTMENT (OUTPATIENT)
Dept: CARE COORDINATION | Facility: HOME HEALTH | Age: 53
End: 2023-11-07

## 2023-11-28 ENCOUNTER — APPOINTMENT (OUTPATIENT)
Dept: CARE COORDINATION | Facility: HOME HEALTH | Age: 53
End: 2023-11-28
Payer: MEDICARE

## 2023-11-28 VITALS — WEIGHT: 197 LBS | HEIGHT: 63 IN | BODY MASS INDEX: 34.91 KG/M2

## 2023-11-28 DIAGNOSIS — F33.41 MAJOR DEPRESSIVE DISORDER, RECURRENT, IN PARTIAL REMISSION: ICD-10-CM

## 2023-11-28 DIAGNOSIS — E66.09 OTHER OBESITY DUE TO EXCESS CALORIES: ICD-10-CM

## 2023-11-28 PROCEDURE — G0444 DEPRESSION SCREEN ANNUAL: CPT | Mod: 59

## 2023-11-28 PROCEDURE — 99348 HOME/RES VST EST LOW MDM 30: CPT | Mod: 95,25

## 2023-11-28 PROCEDURE — G0447 BEHAVIOR COUNSEL OBESITY 15M: CPT | Mod: 59

## 2023-11-28 RX ORDER — POLYETHYLENE GLYCOL 3350 AND ELECTROLYTES WITH LEMON FLAVOR 236; 22.74; 6.74; 5.86; 2.97 G/4L; G/4L; G/4L; G/4L; G/4L
236 POWDER, FOR SOLUTION ORAL
Qty: 1 | Refills: 0 | Status: DISCONTINUED | COMMUNITY
Start: 2023-06-12 | End: 2023-11-28

## 2023-11-28 RX ORDER — TRAZODONE HYDROCHLORIDE 100 MG/1
100 TABLET ORAL
Qty: 1 | Refills: 0 | Status: ACTIVE | COMMUNITY
Start: 2023-11-28

## 2023-11-28 RX ORDER — RISPERIDONE 4 MG/1
4 TABLET, FILM COATED ORAL AT BEDTIME
Refills: 0 | Status: ACTIVE | COMMUNITY
Start: 2023-11-28

## 2023-12-03 PROBLEM — F33.41 RECURRENT MAJOR DEPRESSIVE DISORDER, IN PARTIAL REMISSION: Status: ACTIVE | Noted: 2023-12-03

## 2023-12-03 PROBLEM — E66.09 CLASS 1 OBESITY DUE TO EXCESS CALORIES WITH SERIOUS COMORBIDITY AND BODY MASS INDEX (BMI) OF 34.0 TO 34.9 IN ADULT: Status: ACTIVE | Noted: 2023-12-03

## 2024-01-05 ENCOUNTER — APPOINTMENT (OUTPATIENT)
Dept: INTERNAL MEDICINE | Facility: CLINIC | Age: 54
End: 2024-01-05
Payer: MEDICARE

## 2024-01-05 VITALS
HEIGHT: 63 IN | HEART RATE: 93 BPM | OXYGEN SATURATION: 98 % | DIASTOLIC BLOOD PRESSURE: 75 MMHG | SYSTOLIC BLOOD PRESSURE: 138 MMHG | RESPIRATION RATE: 16 BRPM

## 2024-01-05 DIAGNOSIS — M25.511 PAIN IN RIGHT SHOULDER: ICD-10-CM

## 2024-01-05 DIAGNOSIS — L29.9 PRURITUS, UNSPECIFIED: ICD-10-CM

## 2024-01-05 PROCEDURE — 99213 OFFICE O/P EST LOW 20 MIN: CPT

## 2024-01-05 NOTE — ASSESSMENT
[FreeTextEntry1] : Chest abscess/cyst: Will complete antibiotics, continue warm compresses Follow up in 2 weeks Discussed likely to need surgery eval for excision, referral provided  Needs home care forms completed, can complete at follow up

## 2024-01-05 NOTE — PHYSICAL EXAM
[No Acute Distress] : no acute distress [Well-Appearing] : well-appearing [Normal Voice/Communication] : normal voice/communication [Normal] : no respiratory distress, lungs were clear to auscultation bilaterally and no accessory muscle use [Normal Supraclavicular Nodes] : no supraclavicular lymphadenopathy [Normal Axillary Nodes] : no axillary lymphadenopathy [Normal Anterior Cervical Nodes] : no anterior cervical lymphadenopathy [de-identified] : lesion in upper center chest red, tender with induration

## 2024-01-05 NOTE — HISTORY OF PRESENT ILLNESS
[FreeTextEntry8] : 53-year-old female with history of arthritis, asthma, CHF, depression, GERD, HTN, MARGARETH (use CPAP), pre-diabetic, obesity. Developed cyst/boil on her chest about 1.5 weeks ago.  Became painful, red and swollen.  Seen at urgent care 1 week ago.  Drained there and placed on Augmentin + Bactrim as well as topical mupirocin.  Seen there for follow up 3 days later and drained further.   Presents today for follow up.  Feels overall has continued to improve.  Slightly sore, but less and decreased size, decreased redness.   No fever/chills/sweats/nausea.    O-L Flap Text: The defect edges were debeveled with a #15 scalpel blade.  Given the location of the defect, shape of the defect and the proximity to free margins an O-L flap was deemed most appropriate.  Using a sterile surgical marker, an appropriate advancement flap was drawn incorporating the defect and placing the expected incisions within the relaxed skin tension lines where possible.    The area thus outlined was incised deep to adipose tissue with a #15 scalpel blade.  The skin margins were undermined to an appropriate distance in all directions utilizing iris scissors.

## 2024-01-18 ENCOUNTER — APPOINTMENT (OUTPATIENT)
Dept: SURGERY | Facility: CLINIC | Age: 54
End: 2024-01-18
Payer: MEDICARE

## 2024-01-18 VITALS
DIASTOLIC BLOOD PRESSURE: 72 MMHG | BODY MASS INDEX: 35.08 KG/M2 | SYSTOLIC BLOOD PRESSURE: 109 MMHG | HEIGHT: 63 IN | WEIGHT: 198 LBS | HEART RATE: 72 BPM

## 2024-01-18 DIAGNOSIS — Z56.0 UNEMPLOYMENT, UNSPECIFIED: ICD-10-CM

## 2024-01-18 DIAGNOSIS — M79.89 OTHER SPECIFIED SOFT TISSUE DISORDERS: ICD-10-CM

## 2024-01-18 PROCEDURE — 99203 OFFICE O/P NEW LOW 30 MIN: CPT

## 2024-01-18 SDOH — ECONOMIC STABILITY - INCOME SECURITY: UNEMPLOYMENT, UNSPECIFIED: Z56.0

## 2024-01-18 NOTE — PLAN
[FreeTextEntry1] : Ms. RIGGINS  was told significance of findings, options, risks and benefits were explained.  Informed consent for excision  mid chest wall  mass       , and potential risks, benefits and alternatives (surgical options were discussed including non-surgical options or the option of no surgery) to the planned surgery were discussed in depth.  All surgical options were discussed including non-surgical treatments.  She wishes to proceed with surgery.  We will plan for surgery on at the next available date, pending any required insurance pre-certification or pre-approval. She agrees to obtain any necessary pre-operative evaluations and testing prior to surgery. Patient advised to seek immediate medical attention with any acute change in symptoms or with the development of any new or worsening symptoms.  Patient's questions and concerns addressed to patient's satisfaction, and patient verbalized an understanding of the information discussed.

## 2024-01-18 NOTE — PHYSICAL EXAM
[Alert] : alert [Oriented to Person] : oriented to person [Oriented to Place] : oriented to place [Oriented to Time] : oriented to time [Calm] : calm [de-identified] : She  is alert, well-groomed, and in NAD   [de-identified] : anicteric.  Nasal mucosa pink, septum midline. Oral mucosa pink.  Tongue midline, Pharynx without exudates.   [de-identified] : Neck supple. Trachea midline. Thyroid isthmus barely palpable, lobes not felt.   [de-identified] : mid chest wall  mass is mobile, Firm,  Smooth, non-tender,   Well defined. Superficial . No palpable lymph nodes.   Mass size - 2 cm x 2  cm.

## 2024-01-18 NOTE — CONSULT LETTER
[Dear  ___] : Dear  [unfilled], [Consult Letter:] : I had the pleasure of evaluating your patient, [unfilled]. [Please see my note below.] : Please see my note below. [Consult Closing:] : Thank you very much for allowing me to participate in the care of this patient.  If you have any questions, please do not hesitate to contact me. [Sincerely,] : Sincerely, [FreeTextEntry3] : Goyo Stroud MD, FACS

## 2024-01-18 NOTE — HISTORY OF PRESENT ILLNESS
[de-identified] : This is a 53 year  old patient who was referred by Dr. Loredo with the chief complaint of having pimple between her breast.   She reports having this condition for 1 month. She denies any trauma to the area.   She denies any fever or  night sweats. Appetite is good and weight is stable.  She states that 2 days before [place] the area became swollen and red . she tried to  apply warm compress and  the mass started to  get  bigger and  more symptomatic.  she went to the Urgent Care and was prescribed Augmentin. the Mass got smaller but it still persists. She wants to know if it could  be surgically  removed.

## 2024-01-19 ENCOUNTER — APPOINTMENT (OUTPATIENT)
Dept: INTERNAL MEDICINE | Facility: CLINIC | Age: 54
End: 2024-01-19

## 2024-01-23 NOTE — ED BEHAVIORAL HEALTH ASSESSMENT NOTE - WAS THIS WITHIN THE PAST 3 MONTHS?
Blank is being seen as a new PT to re-establish with EP and for opinion regarding further evaluation and management of Postural Orthostatic Tachycardic Syndrome.  She was last seen by us on 8/15/2018 and had a positive Tilt Table Test and diagnosis of Postural Orthostatic Tachycardic Syndrome. She was tried on Midodrine but stopped the medication due to side effects.  At her last visit, we decided to continue with Metoprolol and to continue follow-up with Dr Murray.       She has since been seeing [facility] NP in [place] and was diagnosed with Autonomic Dysfunction 10/3/2018 but with symptoms onset in [id number].  She was prescribed Corlanor 5 mg BID but did not get the prescription and therefore was recommended to start it when she saw Neurology on 11/2/2023.  She is also on Pyridostigmine 60 mg BID.  There was some concern for possible Atrial Fibrillation due to irregular rhythm and Pt was offered a cardiac event monitor by Neurology but she declined this due to prior issues with a monitor.          No Echo since 2015.      No labs since 12/31/2022.  Labs were ordered on 9/25/2023 but never completed.         No

## 2024-01-29 ENCOUNTER — APPOINTMENT (OUTPATIENT)
Dept: INTERNAL MEDICINE | Facility: CLINIC | Age: 54
End: 2024-01-29
Payer: MEDICARE

## 2024-01-29 VITALS
SYSTOLIC BLOOD PRESSURE: 95 MMHG | TEMPERATURE: 98 F | HEART RATE: 80 BPM | BODY MASS INDEX: 35.08 KG/M2 | OXYGEN SATURATION: 95 % | HEIGHT: 63 IN | DIASTOLIC BLOOD PRESSURE: 69 MMHG | WEIGHT: 198 LBS

## 2024-01-29 DIAGNOSIS — G47.33 OBSTRUCTIVE SLEEP APNEA (ADULT) (PEDIATRIC): ICD-10-CM

## 2024-01-29 DIAGNOSIS — J45.909 UNSPECIFIED ASTHMA, UNCOMPLICATED: ICD-10-CM

## 2024-01-29 DIAGNOSIS — G47.00 INSOMNIA, UNSPECIFIED: ICD-10-CM

## 2024-01-29 DIAGNOSIS — I50.9 HEART FAILURE, UNSPECIFIED: ICD-10-CM

## 2024-01-29 DIAGNOSIS — L02.91 CUTANEOUS ABSCESS, UNSPECIFIED: ICD-10-CM

## 2024-01-29 DIAGNOSIS — I10 ESSENTIAL (PRIMARY) HYPERTENSION: ICD-10-CM

## 2024-01-29 DIAGNOSIS — R20.2 ANESTHESIA OF SKIN: ICD-10-CM

## 2024-01-29 DIAGNOSIS — F41.9 ANXIETY DISORDER, UNSPECIFIED: ICD-10-CM

## 2024-01-29 DIAGNOSIS — M54.9 DORSALGIA, UNSPECIFIED: ICD-10-CM

## 2024-01-29 DIAGNOSIS — Z01.818 ENCOUNTER FOR OTHER PREPROCEDURAL EXAMINATION: ICD-10-CM

## 2024-01-29 DIAGNOSIS — R06.02 SHORTNESS OF BREATH: ICD-10-CM

## 2024-01-29 DIAGNOSIS — R20.0 ANESTHESIA OF SKIN: ICD-10-CM

## 2024-01-29 DIAGNOSIS — G43.909 MIGRAINE, UNSPECIFIED, NOT INTRACTABLE, W/OUT STATUS MIGRAINOSUS: ICD-10-CM

## 2024-01-29 PROCEDURE — 99214 OFFICE O/P EST MOD 30 MIN: CPT

## 2024-01-29 RX ORDER — CARVEDILOL 6.25 MG/1
6.25 TABLET, FILM COATED ORAL
Refills: 0 | Status: DISCONTINUED | COMMUNITY

## 2024-01-29 RX ORDER — ALPRAZOLAM 2 MG/1
2 TABLET ORAL
Refills: 0 | Status: ACTIVE | COMMUNITY
Start: 2017-01-12

## 2024-01-29 RX ORDER — SERTRALINE HYDROCHLORIDE 25 MG/1
TABLET, FILM COATED ORAL
Refills: 0 | Status: DISCONTINUED | COMMUNITY

## 2024-01-29 RX ORDER — IPRATROPIUM BROMIDE AND ALBUTEROL SULFATE 2.5; .5 MG/3ML; MG/3ML
0.5-2.5 (3) SOLUTION RESPIRATORY (INHALATION)
Qty: 1 | Refills: 3 | Status: ACTIVE | COMMUNITY
Start: 2024-01-29 | End: 1900-01-01

## 2024-01-29 RX ORDER — SERTRALINE HYDROCHLORIDE 100 MG/1
100 TABLET, FILM COATED ORAL
Qty: 90 | Refills: 3 | Status: ACTIVE | COMMUNITY
Start: 2023-11-28

## 2024-01-29 RX ORDER — ERGOCALCIFEROL 1.25 MG/1
1.25 MG CAPSULE, LIQUID FILLED ORAL
Qty: 8 | Refills: 0 | Status: ACTIVE | COMMUNITY
Start: 2024-01-29

## 2024-01-29 RX ORDER — MONTELUKAST 10 MG/1
10 TABLET, FILM COATED ORAL
Refills: 0 | Status: ACTIVE | COMMUNITY
Start: 2024-01-29

## 2024-01-29 RX ORDER — CYANOCOBALAMIN (VITAMIN B-12) 1000 MCG
1000 TABLET ORAL
Qty: 90 | Refills: 3 | Status: ACTIVE | COMMUNITY
Start: 2024-01-29

## 2024-01-29 RX ORDER — ZOLPIDEM TARTRATE 10 MG/1
10 TABLET, FILM COATED ORAL
Refills: 0 | Status: ACTIVE | COMMUNITY

## 2024-01-29 NOTE — HISTORY OF PRESENT ILLNESS
[de-identified] : 53-year-old female with history of arthritis, asthma, CHF, depression, GERD, HTN, MARGARETH  (repeat sleep study 8/6/23 negative for MARGARETH), pre-diabetic, obesity, left ankle fracture with multiple corrective surgeries (last 2017 - hardware removal - may need joint replacement). Seen here 3 weeks ago regarding chest boil.  Had I&D done at urgent care and was taking oral antibiotics.  Overall has decreased in size, still mildly tender.   Surgery eval last week will be going for excision of chest wall mass on 2/16/24 with Dr. Stroud.   Home care forms completed by cardiology to recertify.  Currently receives home health services to help with some IADLs due to asthma symptoms and left ankle pain and swelling.   Follows with cardiology regularly - Dr. Víctor Bazzi, [phone].  Reports echocardiogram, stress test with follow up cath last year.  No intervention needed during cath.  Reports no formal exercise, but can go up and down 2 flights of stairs without chest pain, dyspnea, palpitation, dizziness (lies in 3 floor walk up).  Does get episodic asthma symptoms, reports mostly controlled with Montelukast and PRN albuterol.   No h/o bleeding or clotting disorder.   Migraines have not been an issues in a while.   Mood has been stable, continues close f/u with psychiatry.   Does not smoke, no alcohol.   H/o GDM with last A1C at 6.1.

## 2024-01-29 NOTE — REVIEW OF SYSTEMS
[Negative] : Musculoskeletal [Headache] : no headache [Dizziness] : no dizziness [Fainting] : no fainting [Unsteady Walking] : no ataxia [Easy Bleeding] : no easy bleeding [Easy Bruising] : no easy bruising [FreeTextEntry6] : asthma symptoms with dust and animal dander exposure, sometimes change in weather  [FreeTextEntry9] : h/o left ankle pain and swelling s/p fracture and several surgeries

## 2024-01-29 NOTE — ASSESSMENT
[FreeTextEntry1] : --- Chest wall mass: Greatly improved, no longer tender Still with area of induration and drainage, scheduled for surgical excision.   Preop labs today.   Reports cardiology follow up earlier this month 1/9/24 with EKG done.  No change in symptoms. She will obtain clearance letter from cardiology as well as we do not have all of her cardiac testing results.     Asthma: Reports mostly controlled with Montelukast PRN albuterol/ipratropium  PFTs show mild restriction 2/2 body habitus Followed by pulmonary   H/o MARGARETH treated with CPAP: Recent repeat study negative with MARGARETH  HTN: Well controlled  CHF: per cardio   Ankle arthritis s/p fracture: Continued pain and swelling s/p several restrictive surgeries Need assistance with some IADLs, independent with ADLs  Anxiety, depression, insomnia: Reports stable continues close f/u with psychiatry

## 2024-01-29 NOTE — PHYSICAL EXAM
[No Acute Distress] : no acute distress [Well-Appearing] : well-appearing [Normal Voice/Communication] : normal voice/communication [No Carotid Bruits] : no carotid bruits [Pedal Pulses Present] : the pedal pulses are present [No Edema] : there was no peripheral edema [Normal] : no CVA or spinal tenderness [Grossly Normal Strength/Tone] : grossly normal strength/tone [Coordination Grossly Intact] : coordination grossly intact [No Focal Deficits] : no focal deficits [Normal Gait] : normal gait [Normal Insight/Judgement] : insight and judgment were intact

## 2024-01-31 LAB
ALBUMIN SERPL ELPH-MCNC: 3.7 G/DL
ALP BLD-CCNC: 78 U/L
ALT SERPL-CCNC: 10 U/L
ANION GAP SERPL CALC-SCNC: 8 MMOL/L
APTT BLD: 28.9 SEC
AST SERPL-CCNC: 17 U/L
BASOPHILS # BLD AUTO: 0.04 K/UL
BASOPHILS NFR BLD AUTO: 0.6 %
BILIRUB SERPL-MCNC: <0.2 MG/DL
BUN SERPL-MCNC: 8 MG/DL
CALCIUM SERPL-MCNC: 9 MG/DL
CHLORIDE SERPL-SCNC: 105 MMOL/L
CO2 SERPL-SCNC: 25 MMOL/L
CREAT SERPL-MCNC: 0.79 MG/DL
EGFR: 91 ML/MIN/1.73M2
EOSINOPHIL # BLD AUTO: 0.11 K/UL
EOSINOPHIL NFR BLD AUTO: 1.7 %
GLUCOSE SERPL-MCNC: 92 MG/DL
HCT VFR BLD CALC: 36.7 %
HGB BLD-MCNC: 11 G/DL
IMM GRANULOCYTES NFR BLD AUTO: 0.2 %
INR PPP: 0.92 RATIO
LYMPHOCYTES # BLD AUTO: 2.39 K/UL
LYMPHOCYTES NFR BLD AUTO: 35.9 %
MAN DIFF?: NORMAL
MCHC RBC-ENTMCNC: 24.7 PG
MCHC RBC-ENTMCNC: 30 GM/DL
MCV RBC AUTO: 82.5 FL
MONOCYTES # BLD AUTO: 0.59 K/UL
MONOCYTES NFR BLD AUTO: 8.9 %
NEUTROPHILS # BLD AUTO: 3.51 K/UL
NEUTROPHILS NFR BLD AUTO: 52.7 %
PLATELET # BLD AUTO: 430 K/UL
POTASSIUM SERPL-SCNC: 4.4 MMOL/L
PROT SERPL-MCNC: 6.8 G/DL
PT BLD: 10.4 SEC
RBC # BLD: 4.45 M/UL
RBC # FLD: 15.7 %
SODIUM SERPL-SCNC: 138 MMOL/L
WBC # FLD AUTO: 6.65 K/UL

## 2024-02-05 ENCOUNTER — APPOINTMENT (OUTPATIENT)
Dept: RADIOLOGY | Facility: CLINIC | Age: 54
End: 2024-02-05
Payer: MEDICARE

## 2024-02-05 PROCEDURE — 71045 X-RAY EXAM CHEST 1 VIEW: CPT

## 2024-02-12 ENCOUNTER — OUTPATIENT (OUTPATIENT)
Dept: OUTPATIENT SERVICES | Facility: HOSPITAL | Age: 54
LOS: 1 days | End: 2024-02-12
Payer: MEDICARE

## 2024-02-12 VITALS
DIASTOLIC BLOOD PRESSURE: 63 MMHG | HEIGHT: 63 IN | TEMPERATURE: 98 F | OXYGEN SATURATION: 97 % | WEIGHT: 194.01 LBS | RESPIRATION RATE: 18 BRPM | SYSTOLIC BLOOD PRESSURE: 110 MMHG | HEART RATE: 70 BPM

## 2024-02-12 DIAGNOSIS — Z01.818 ENCOUNTER FOR OTHER PREPROCEDURAL EXAMINATION: ICD-10-CM

## 2024-02-12 DIAGNOSIS — M79.89 OTHER SPECIFIED SOFT TISSUE DISORDERS: ICD-10-CM

## 2024-02-12 DIAGNOSIS — I10 ESSENTIAL (PRIMARY) HYPERTENSION: ICD-10-CM

## 2024-02-12 RX ORDER — CYCLOBENZAPRINE HYDROCHLORIDE 10 MG/1
1 TABLET, FILM COATED ORAL
Refills: 0 | DISCHARGE

## 2024-02-12 RX ORDER — TOPIRAMATE 25 MG
1 TABLET ORAL
Refills: 0 | DISCHARGE

## 2024-02-12 RX ORDER — RISPERIDONE 4 MG/1
1 TABLET ORAL
Refills: 0 | DISCHARGE

## 2024-02-12 RX ORDER — BENZTROPINE MESYLATE 1 MG
1 TABLET ORAL
Refills: 0 | DISCHARGE

## 2024-02-12 RX ORDER — ALPRAZOLAM 0.25 MG
1 TABLET ORAL
Refills: 0 | DISCHARGE

## 2024-02-12 RX ORDER — FERROUS SULFATE 325(65) MG
1 TABLET ORAL
Refills: 0 | DISCHARGE

## 2024-02-12 RX ORDER — RIBOFLAVIN (VITAMIN B2) 25 MG
1 TABLET ORAL
Refills: 0 | DISCHARGE

## 2024-02-12 RX ORDER — ERGOCALCIFEROL 1.25 MG/1
1 CAPSULE ORAL
Refills: 0 | DISCHARGE

## 2024-02-12 RX ORDER — PALIPERIDONE 1.5 MG/1
117 TABLET, EXTENDED RELEASE ORAL
Refills: 0 | DISCHARGE

## 2024-02-12 RX ORDER — METOPROLOL TARTRATE 50 MG
1 TABLET ORAL
Refills: 0 | DISCHARGE

## 2024-02-12 RX ORDER — SERTRALINE 25 MG/1
1 TABLET, FILM COATED ORAL
Refills: 0 | DISCHARGE

## 2024-02-12 NOTE — H&P PST ADULT - NS MD HP INPLANTS MED DEV
Left ankle screw Detail Level: Detailed Quality 431: Preventive Care And Screening: Unhealthy Alcohol Use - Screening: Patient not identified as an unhealthy alcohol user when screened for unhealthy alcohol use using a systematic screening method Quality 226: Preventive Care And Screening: Tobacco Use: Screening And Cessation Intervention: Patient screened for tobacco use and is an ex/non-smoker Quality 130: Documentation Of Current Medications In The Medical Record: Current Medications Documented

## 2024-02-12 NOTE — H&P PST ADULT - PROBLEM SELECTOR PLAN 1
Preop instructions and chlorhexidine soap given in both written and verbal form. Pt verbalized understanding.   Labs CBC BMP on chart performed w/ PCP  Cardiac clearance Preop instructions and chlorhexidine soap given in both written and verbal form. Pt verbalized understanding.   Labs CBC BMP on chart performed w/ PCP  Cardiac clearance and EKG to be obtained

## 2024-02-12 NOTE — H&P PST ADULT - NSICDXPASTMEDICALHX_GEN_ALL_CORE_FT
PAST MEDICAL HISTORY:  Asthma     CHF (congestive heart failure) Dx 2014    HTN (hypertension)     MARGARETH (obstructive sleep apnea)

## 2024-02-12 NOTE — H&P PST ADULT - HISTORY OF PRESENT ILLNESS
This is a 54 year old CHF (2014) asthma (last asthma attack years, uses albuterol PRN 2x weekly triggered by envitomental allergens, MARGARETH no longer on CPAP (after having bariatric surgery 2010)      54 y/o female with a PMHx of asthma, depression, MARGARETH on CPAP, SOB, Hc CHFwho endorsed sharp mid sternal, sharp pain without radiation and pt underwent cardiac evaluation with Dr Styles and reports she had a stress test that was abnormal abd referred for C with possible intervention. Denies diaphoresis, palpitations, nausea, vomiting, peripheral edema, recent weight gain, or syncopal/near syncopal episodes.  No implantable cardiac monitoring device implants. This is a 54 year old CHF (2014) follows with cardiologist every 6 months, asthma (last asthma attack years, uses albuterol PRN 2x weekly triggered by environmental allergens, MARGARETH no longer on CPAP (after having bariatric surgery 2010) asthma, and depression. Reports having noticed a mid chest wall lump for the last few months, which requires I&D and antibiotics with minimal resolution. Presenting to PST for scheduled Excision of mid chest wall mass on 2/16/24 with Dr. Chavarria.

## 2024-02-12 NOTE — H&P PST ADULT - ASSESSMENT
no DASI Score:  DASI Activity: able to go up one flight of stairs or walk 1-2 blocks with out difficulty. Occassional uses cane due to ankle pain  Loose or removable teeth: Top and bottom partials dentures   54 year old CHF (2014) asthma (last asthma attack years, uses albuterol PRN 2x weekly triggered by environmental allergens, MARGARETH no longer on CPAP (after having bariatric surgery 2010) asthma, and depression. Presenting to PST for scheduled Excision of mid chest wall mass on 2/16/24 with Dr. Chavarria.       DASI Score: 5.29  DASI Activity: able to go up one flight of stairs or walk 1-2 blocks with out difficulty. Occassional uses cane due to ankle pain  Loose or removable teeth: Top and bottom partials dentures   54 year old CHF (2014) asthma (last asthma attack years, uses albuterol PRN 2x weekly triggered by environmental allergens, MARGARETH no longer on CPAP (after having bariatric surgery 2010) asthma, and depression. Presenting to PST for scheduled Excision of mid chest wall mass on 2/16/24 with Dr. Chavarria.       DASI Score: 6  DASI Activity: able to go up one flight of stairs or walk 1-2 blocks with out difficulty. Occassional uses cane due to ankle pain  Loose or removable teeth: Top and bottom partials dentures

## 2024-02-13 PROCEDURE — G0463: CPT

## 2024-03-20 PROBLEM — G47.33 OBSTRUCTIVE SLEEP APNEA (ADULT) (PEDIATRIC): Chronic | Status: ACTIVE | Noted: 2024-02-12

## 2024-03-20 PROBLEM — I10 ESSENTIAL (PRIMARY) HYPERTENSION: Chronic | Status: ACTIVE | Noted: 2024-02-12

## 2024-04-05 ENCOUNTER — APPOINTMENT (OUTPATIENT)
Dept: SURGERY | Facility: HOSPITAL | Age: 54
End: 2024-04-05

## 2024-04-15 ENCOUNTER — APPOINTMENT (OUTPATIENT)
Dept: INTERNAL MEDICINE | Facility: CLINIC | Age: 54
End: 2024-04-15
Payer: MEDICARE

## 2024-04-15 VITALS
WEIGHT: 197 LBS | DIASTOLIC BLOOD PRESSURE: 75 MMHG | HEART RATE: 71 BPM | OXYGEN SATURATION: 96 % | HEIGHT: 63 IN | BODY MASS INDEX: 34.91 KG/M2 | SYSTOLIC BLOOD PRESSURE: 105 MMHG | TEMPERATURE: 98.4 F

## 2024-04-15 DIAGNOSIS — Z00.00 ENCOUNTER FOR GENERAL ADULT MEDICAL EXAMINATION W/OUT ABNORMAL FINDINGS: ICD-10-CM

## 2024-04-15 PROCEDURE — G0439: CPT

## 2024-04-15 NOTE — HISTORY OF PRESENT ILLNESS
[de-identified] : this is a 54-year-old female with history of arthritis, asthma, CHF, depression, GERD, HTN, MARGARETH (repeat sleep study 8/6/23 negative for MARGARETH), pre-diabetic, obesity, left ankle fracture with multiple corrective surgeries (last 2017 - hardware removal - may need joint replacement).seen for CPE  transitioning care last seen by me 2018   Obesity -  did gastric bypass in November 2013 , after lost 42 lbs since. Her asthma, arthritis, and sleeping is much better since her weight loss.-trying to lose weight through diet portion control - more veges   HX anxiety / depression / insomnia - followed by therapist -  - gets her meds from Therapist   lower back pain -chronic flares on and off   h/o unstable angina / cardiomyopathy-- followed by cardiologist Dr Víctor Garzon at [facility] ,-  - records requested  -on metoprolol 25 BID  -does get chest pains in extreme stressed situations , has family stress- elder son ADHD and bipolar not getting treatments she got order of protection against him..   Asthma - acting up due to weather , feels better since gastric bypass surgery, using inhaler less frequent , needs refill for Ventolin , had to use it today due to allergies , no exacerbation since.   h/o obstructive sleep apnea- saw sleep  ,did repeat Study 8/23 - negative for MARGARETH- off CPAP now   prediabetic- controlled now , has lost 40 pounds since, and watching diet,

## 2024-04-15 NOTE — HEALTH RISK ASSESSMENT
[Good] : ~his/her~  mood as  good [No] : In the past 12 months have you used drugs other than those required for medical reasons? No [No falls in past year] : Patient reported no falls in the past year [0] : 2) Feeling down, depressed, or hopeless: Not at all (0) [PHQ-2 Negative - No further assessment needed] : PHQ-2 Negative - No further assessment needed [Patient reported PAP Smear was normal] : Patient reported PAP Smear was normal [Unemployed] : unemployed [Single] : single [Never] : Never [de-identified] : Active goes up and down steps - gym 3 x a week tredmill - 20 mins  [OGB6Thmlf] : 0 [Reports changes in hearing] : Reports no changes in hearing [Reports changes in vision] : Reports no changes in vision [Reports changes in dental health] : Reports no changes in dental health [PapSmearDate] : 8/2023  [de-identified] : daughter

## 2024-04-17 LAB
25(OH)D3 SERPL-MCNC: 35.9 NG/ML
ALBUMIN SERPL ELPH-MCNC: 3.9 G/DL
ALP BLD-CCNC: 100 U/L
ALT SERPL-CCNC: 13 U/L
ANION GAP SERPL CALC-SCNC: 13 MMOL/L
APPEARANCE: ABNORMAL
AST SERPL-CCNC: 18 U/L
BACTERIA: ABNORMAL /HPF
BILIRUB SERPL-MCNC: 0.2 MG/DL
BILIRUBIN URINE: NEGATIVE
BLOOD URINE: ABNORMAL
BUN SERPL-MCNC: 9 MG/DL
CALCIUM SERPL-MCNC: 9.2 MG/DL
CAST: 0 /LPF
CHLORIDE SERPL-SCNC: 106 MMOL/L
CHOLEST SERPL-MCNC: 174 MG/DL
CO2 SERPL-SCNC: 20 MMOL/L
COLOR: YELLOW
CREAT SERPL-MCNC: 0.82 MG/DL
EGFR: 86 ML/MIN/1.73M2
EPITHELIAL CELLS: 3 /HPF
ESTIMATED AVERAGE GLUCOSE: 126 MG/DL
GLUCOSE QUALITATIVE U: NEGATIVE MG/DL
GLUCOSE SERPL-MCNC: 94 MG/DL
HBA1C MFR BLD HPLC: 6 %
HCT VFR BLD CALC: 35.2 %
HDLC SERPL-MCNC: 44 MG/DL
HGB BLD-MCNC: 10.4 G/DL
KETONES URINE: NEGATIVE MG/DL
LDLC SERPL CALC-MCNC: 114 MG/DL
LEUKOCYTE ESTERASE URINE: ABNORMAL
MCHC RBC-ENTMCNC: 23 PG
MCHC RBC-ENTMCNC: 29.5 GM/DL
MCV RBC AUTO: 77.7 FL
MICROSCOPIC-UA: NORMAL
NITRITE URINE: NEGATIVE
NONHDLC SERPL-MCNC: 130 MG/DL
PH URINE: 6.5
PLATELET # BLD AUTO: 521 K/UL
POTASSIUM SERPL-SCNC: 4.6 MMOL/L
PROT SERPL-MCNC: 7.4 G/DL
PROTEIN URINE: 30 MG/DL
RBC # BLD: 4.53 M/UL
RBC # FLD: 15.7 %
RED BLOOD CELLS URINE: 2 /HPF
REVIEW: NORMAL
SODIUM SERPL-SCNC: 139 MMOL/L
SPECIFIC GRAVITY URINE: 1.03
TRIGL SERPL-MCNC: 88 MG/DL
TSH SERPL-ACNC: 0.83 UIU/ML
UROBILINOGEN URINE: 1 MG/DL
VIT B12 SERPL-MCNC: 1007 PG/ML
WBC # FLD AUTO: 6.73 K/UL
WHITE BLOOD CELLS URINE: 0 /HPF

## 2024-06-11 RX ORDER — KETOCONAZOLE 20.5 MG/ML
2 SHAMPOO, SUSPENSION TOPICAL
Qty: 120 | Refills: 1 | Status: ACTIVE | COMMUNITY
Start: 2024-06-11 | End: 1900-01-01

## 2024-07-22 ENCOUNTER — APPOINTMENT (OUTPATIENT)
Dept: ULTRASOUND IMAGING | Facility: CLINIC | Age: 54
End: 2024-07-22

## 2024-07-22 ENCOUNTER — APPOINTMENT (OUTPATIENT)
Dept: MAMMOGRAPHY | Facility: CLINIC | Age: 54
End: 2024-07-22

## 2024-07-22 ENCOUNTER — RESULT REVIEW (OUTPATIENT)
Age: 54
End: 2024-07-22

## 2024-07-22 PROCEDURE — 77067 SCR MAMMO BI INCL CAD: CPT

## 2024-07-22 PROCEDURE — 77063 BREAST TOMOSYNTHESIS BI: CPT

## 2024-08-13 ENCOUNTER — APPOINTMENT (OUTPATIENT)
Dept: DERMATOLOGY | Facility: CLINIC | Age: 54
End: 2024-08-13

## 2024-08-21 NOTE — ED PROVIDER NOTE - CROS ED GI ALL NEG
Rx Refill Note  Requested Prescriptions     Pending Prescriptions Disp Refills    Zepbound 7.5 MG/0.5ML solution auto-injector [Pharmacy Med Name: ZEPBOUND 7.5MG/0.5ML INJ (4PF PENS)] 2 mL 0     Sig: ADMINISTER 7.5 MG UNDER THE SKIN 1 TIME EVERY WEEK AS DIRECTED      Last office visit with prescribing clinician: 6/26/2024   Last telemedicine visit with prescribing clinician: Visit date not found   Next office visit with prescribing clinician: Visit date not found       Diana Alba MA  08/21/24, 15:02 EDT   negative...

## 2024-09-16 ENCOUNTER — APPOINTMENT (OUTPATIENT)
Dept: INTERNAL MEDICINE | Facility: CLINIC | Age: 54
End: 2024-09-16
Payer: MEDICARE

## 2024-09-16 VITALS
DIASTOLIC BLOOD PRESSURE: 74 MMHG | TEMPERATURE: 98 F | OXYGEN SATURATION: 96 % | BODY MASS INDEX: 36.32 KG/M2 | HEART RATE: 73 BPM | HEIGHT: 63 IN | WEIGHT: 205 LBS | SYSTOLIC BLOOD PRESSURE: 107 MMHG

## 2024-09-16 DIAGNOSIS — Z23 ENCOUNTER FOR IMMUNIZATION: ICD-10-CM

## 2024-09-16 DIAGNOSIS — K21.9 GASTRO-ESOPHAGEAL REFLUX DISEASE W/OUT ESOPHAGITIS: ICD-10-CM

## 2024-09-16 DIAGNOSIS — F33.41 MAJOR DEPRESSIVE DISORDER, RECURRENT, IN PARTIAL REMISSION: ICD-10-CM

## 2024-09-16 DIAGNOSIS — I10 ESSENTIAL (PRIMARY) HYPERTENSION: ICD-10-CM

## 2024-09-16 DIAGNOSIS — D50.8 OTHER IRON DEFICIENCY ANEMIAS: ICD-10-CM

## 2024-09-16 DIAGNOSIS — R73.03 PREDIABETES.: ICD-10-CM

## 2024-09-16 PROCEDURE — 99214 OFFICE O/P EST MOD 30 MIN: CPT | Mod: 25

## 2024-09-16 PROCEDURE — G2211 COMPLEX E/M VISIT ADD ON: CPT | Mod: NC

## 2024-09-16 NOTE — HISTORY OF PRESENT ILLNESS
[de-identified] : this is a 54-year-old female with history of arthritis, asthma, CHF, depression, GERD, HTN, MARGARETH (repeat sleep study 8/6/23 negative for MARGARETH), pre-diabetic, obesity, left ankle fracture with multiple corrective surgeries (last 2017 - hardware removal - may need joint replacement).seen for f/u on ch issues  seen as CPE 4/15/24   Obesity -  did gastric bypass in November 2013 , after lost 42 lbs since. Her asthma, arthritis, and sleeping is much better since her weight loss.-trying to lose weight through diet portion control - more veges   HX anxiety / depression / insomnia - followed by therapist -  - gets her meds from Therapist   lower back pain -chronic flares on and off   h/o unstable angina / cardiomyopathy-- followed by cardiologist Dr Víctor Garzon at [facility] ,-  saw last week 9/10/24 did Echo has stress test scheduled for 9/25/24 -get records from Dr Garzon # [phone] - records requested  -on metoprolol 25 BID  -does get chest pains in extreme stressed situations , has family stress- elder son ADHD and bipolar not getting treatments she got order of protection against him..   Asthma - acting up due to weather , feels better since gastric bypass surgery, using inhaler less frequent , needs refill for Ventolin , had to use it today due to allergies , no exacerbation since.   h/o obstructive sleep apnea- saw sleep  ,did repeat Study 8/23 - negative for MARGARETH- off CPAP now   prediabetic- controlled now , has lost 40 pounds since, and watching diet,

## 2024-09-16 NOTE — ASSESSMENT
[FreeTextEntry1] : SHO --11--> 1/24-- 10.4 4/2024 colonoscope 10/2023 - due 5-7 yrs -adv iron tabs with Vit c 500 qd   hx recurrent Moderate depression// Anxiety  on  zoloft, Respiridone , trazodone Xanax 2 mg  - cannot recall dose - controlled as per pt  - getting xanax 2 mg from cardiologist  -seeing therapist Jana vick monthly  -- her thrapist fills hermeds  -Denies homicidal or suicidal ideas or thoughts, denies any side effects from the medications  -Discussed with patient in detail side effects of all medications, hand out given, advise patient to inform family member that he or she is taking the medication and to watch out for any personality changes, to seek medical attention immediately if they notice any difference.  -Make appointment to see psychiatrist and therapist on a regular basis   Insomnia - sleep hygiene reviewed  -avoid electronics after 9 pm  -limit caffein intake in evenings  -trial of melatonin 3 mg at 7 pm - also takes Ambien 10 mg  as needed for sleep   hx Predm-  AIC 6.0 4/2024 - educated weight loss and low carbohydrate diet and increasing physical activity ,   hx sleep apnea- much better since bariatric surgery, retested 8/2023 and was negative - no more CPAP   Morbid Obesity-BMI 34 -  s/p gastric bypass, Her asthma, arthritis, and sleeping is much better since her weight loss. taking Ambien for sleep   Asthma - stable on ventrolin as needed - neb rx when sick   vitamin D deficiency-. Advise patient to take supplements, rx given   Cardiomyopathy- on medical management ,followed by cardio  stable  -saw last week 9/10/24 did Echo has stress test scheduled for 9/25/24 -get records from Dr Garzon # [phone] - records requested  -on metoprolol 25 BID   Health maintenance Colonoscope -10/23 - tubular adenoma  Tetanus vaccine- 2013 Pneumonia vaccine 2012 Mammogram- 7/2024 bi rad 2  Pap smear-2023  neg std - neg 3/2016 refused flu vaccine.

## 2024-09-17 LAB
ALBUMIN SERPL ELPH-MCNC: 4.2 G/DL
ALP BLD-CCNC: 90 U/L
ALT SERPL-CCNC: 13 U/L
ANION GAP SERPL CALC-SCNC: 11 MMOL/L
AST SERPL-CCNC: 18 U/L
BILIRUB SERPL-MCNC: 0.3 MG/DL
BUN SERPL-MCNC: 8 MG/DL
CALCIUM SERPL-MCNC: 9.3 MG/DL
CHLORIDE SERPL-SCNC: 104 MMOL/L
CO2 SERPL-SCNC: 25 MMOL/L
CREAT SERPL-MCNC: 0.73 MG/DL
EGFR: 99 ML/MIN/1.73M2
ESTIMATED AVERAGE GLUCOSE: 120 MG/DL
FERRITIN SERPL-MCNC: 59 NG/ML
GLUCOSE SERPL-MCNC: 110 MG/DL
HBA1C MFR BLD HPLC: 5.8 %
HCT VFR BLD CALC: 39.4 %
HGB BLD-MCNC: 12.9 G/DL
IRON SATN MFR SERPL: 21 %
IRON SERPL-MCNC: 67 UG/DL
MCHC RBC-ENTMCNC: 27.8 PG
MCHC RBC-ENTMCNC: 32.7 GM/DL
MCV RBC AUTO: 84.9 FL
PLATELET # BLD AUTO: 374 K/UL
POTASSIUM SERPL-SCNC: 4.8 MMOL/L
PROT SERPL-MCNC: 7.3 G/DL
RBC # BLD: 4.64 M/UL
RBC # FLD: 14.6 %
SODIUM SERPL-SCNC: 140 MMOL/L
TIBC SERPL-MCNC: 319 UG/DL
UIBC SERPL-MCNC: 252 UG/DL
WBC # FLD AUTO: 7.88 K/UL

## 2024-09-25 ENCOUNTER — APPOINTMENT (OUTPATIENT)
Dept: CARE COORDINATION | Facility: HOME HEALTH | Age: 54
End: 2024-09-25

## 2024-10-17 ENCOUNTER — APPOINTMENT (OUTPATIENT)
Dept: CARE COORDINATION | Facility: HOME HEALTH | Age: 54
End: 2024-10-17

## 2024-11-11 ENCOUNTER — APPOINTMENT (OUTPATIENT)
Dept: INTERNAL MEDICINE | Facility: CLINIC | Age: 54
End: 2024-11-11

## 2025-03-24 NOTE — BH PATIENT PROFILE - CHOOSE INDICATION TO IMMUNIZE (AN ORDER WILL BE GENERATED WHEN THIS NOTE IS SAVED):
Monitor your blood pressure at home. If still on lower side then do not take your night time blood pressure medicine and return to normal medicine regimen tomorrow.   
Patient is not pregnant (male or female)

## 2025-04-17 ENCOUNTER — APPOINTMENT (OUTPATIENT)
Dept: INTERNAL MEDICINE | Facility: CLINIC | Age: 55
End: 2025-04-17
Payer: MEDICARE

## 2025-04-17 ENCOUNTER — LABORATORY RESULT (OUTPATIENT)
Age: 55
End: 2025-04-17

## 2025-04-17 VITALS
HEART RATE: 105 BPM | TEMPERATURE: 98.9 F | WEIGHT: 211 LBS | BODY MASS INDEX: 37.38 KG/M2 | SYSTOLIC BLOOD PRESSURE: 107 MMHG | OXYGEN SATURATION: 96 % | DIASTOLIC BLOOD PRESSURE: 78 MMHG

## 2025-04-17 DIAGNOSIS — L02.91 CUTANEOUS ABSCESS, UNSPECIFIED: ICD-10-CM

## 2025-04-17 DIAGNOSIS — Z23 ENCOUNTER FOR IMMUNIZATION: ICD-10-CM

## 2025-04-17 DIAGNOSIS — Z00.00 ENCOUNTER FOR GENERAL ADULT MEDICAL EXAMINATION W/OUT ABNORMAL FINDINGS: ICD-10-CM

## 2025-04-17 PROCEDURE — G0439: CPT

## 2025-04-21 LAB
ALBUMIN SERPL ELPH-MCNC: 4.1 G/DL
ALP BLD-CCNC: 104 U/L
ALT SERPL-CCNC: 16 U/L
ANION GAP SERPL CALC-SCNC: 10 MMOL/L
APPEARANCE: ABNORMAL
AST SERPL-CCNC: 18 U/L
BILIRUB SERPL-MCNC: 0.2 MG/DL
BILIRUBIN URINE: NEGATIVE
BLOOD URINE: NEGATIVE
BUN SERPL-MCNC: 10 MG/DL
CALCIUM SERPL-MCNC: 9.5 MG/DL
CHLORIDE SERPL-SCNC: 105 MMOL/L
CHOLEST SERPL-MCNC: 185 MG/DL
CO2 SERPL-SCNC: 24 MMOL/L
COLOR: YELLOW
CREAT SERPL-MCNC: 0.71 MG/DL
EGFRCR SERPLBLD CKD-EPI 2021: 102 ML/MIN/1.73M2
ESTIMATED AVERAGE GLUCOSE: 128 MG/DL
FERRITIN SERPL-MCNC: 40 NG/ML
GLUCOSE QUALITATIVE U: 500 MG/DL
GLUCOSE SERPL-MCNC: 85 MG/DL
HBA1C MFR BLD HPLC: 6.1 %
HCT VFR BLD CALC: 40.6 %
HDLC SERPL-MCNC: 49 MG/DL
HGB BLD-MCNC: 12.7 G/DL
KETONES URINE: NEGATIVE MG/DL
LDLC SERPL-MCNC: 116 MG/DL
LEUKOCYTE ESTERASE URINE: NEGATIVE
MAGNESIUM SERPL-MCNC: 2.1 MG/DL
MCHC RBC-ENTMCNC: 27 PG
MCHC RBC-ENTMCNC: 31.3 G/DL
MCV RBC AUTO: 86.4 FL
NITRITE URINE: NEGATIVE
NONHDLC SERPL-MCNC: 136 MG/DL
PH URINE: 6
PLATELET # BLD AUTO: 394 K/UL
POTASSIUM SERPL-SCNC: 4.2 MMOL/L
PROT SERPL-MCNC: 7.4 G/DL
PROTEIN URINE: NORMAL MG/DL
RBC # BLD: 4.7 M/UL
RBC # FLD: 14.1 %
SODIUM SERPL-SCNC: 140 MMOL/L
SPECIFIC GRAVITY URINE: >1.03
TRIGL SERPL-MCNC: 109 MG/DL
TSH SERPL-ACNC: 0.93 UIU/ML
UROBILINOGEN URINE: 1 MG/DL
VIT B12 SERPL-MCNC: 510 PG/ML
WBC # FLD AUTO: 6.17 K/UL

## 2025-06-18 ENCOUNTER — APPOINTMENT (OUTPATIENT)
Dept: INTERNAL MEDICINE | Facility: CLINIC | Age: 55
End: 2025-06-18
Payer: MEDICARE

## 2025-06-18 VITALS
WEIGHT: 216 LBS | DIASTOLIC BLOOD PRESSURE: 80 MMHG | BODY MASS INDEX: 38.27 KG/M2 | TEMPERATURE: 98 F | SYSTOLIC BLOOD PRESSURE: 114 MMHG | HEIGHT: 63 IN | HEART RATE: 123 BPM | OXYGEN SATURATION: 96 %

## 2025-06-18 PROCEDURE — G2211 COMPLEX E/M VISIT ADD ON: CPT

## 2025-06-18 PROCEDURE — 99214 OFFICE O/P EST MOD 30 MIN: CPT

## 2025-07-02 NOTE — ED BEHAVIORAL HEALTH ASSESSMENT NOTE - NSBHATTESTATTENDBILLTIME_PSY_A_CORE
Physical Therapy    Visit Type: initial evaluation and discharge  Treatment diagnosis: General weakness  SUBJECTIVE  RN Nai aware of therapy session. Patient reports some concerns for discharge, wishing to go through activity with therapy.  Patient has not been hospitalized, in a skilled nursing facility, or seen by home health in the last 30 days.  Patient / Family Goal: return to previous functional status, maximize function and return home    Pain   Patient denies pain.     OBJECTIVE          Bed Mobility  - Supine to sit: supervision  Supervision with education on log roll.  Transfers  Assistive devices: gait belt  - Sit to stand: supervision  - Stand to sit: supervision  Supervision for safety. No concerns    Ambulation / Gait  - Assistive device: gait belt  - Distance (feet unless otherwise indicated): 300  - Assist Level: supervision  - Surface: even    Supervision for IV pole management.    Interventions     Training provided: activity tolerance, balance retraining, bed mobility training, body mechanics, functional ambulation, energy conservation, compensatory techniques, gait training, neuromuscular reeducation, transfer training, stair training and safety training    Skilled input: Verbal instruction/cues, tactile instruction/cues, posture correction and facilitation  Verbal Consent: Writer verbally educated and received verbal consent for hand placement, positioning of patient, and techniques to be performed today from patient for clothing adjustments for techniques, hand placement and palpation for techniques and therapist position for techniques as described above and how they are pertinent to the patient's plan of care.         Education:   Education provided during session:  - Results of above outlined education: Verbalizes understanding and Demonstrates understanding    ASSESSMENT   Discharge needs based on today's assessment:   - Current level of function: at baseline level of function   - Therapy  needs at discharge: does not require ongoing therapy    AM-PAC  - Generalized Prior Level of Function: IND/MOD I ([facility] 22-24)       Key: MOD A=moderate assistance, IND/MOD I=independent/modified independent  - Generalized Current Level of Function     - Current Mobility Score: 24       AM-PAC Scoring Key= >21 Modified Independent; 20-21 Supervision; 18-19 Minimal assist; 13-17 Moderate assist; 9-12 Max assist; <9 Total assist        Predicted patient presentation: Low (stable) - Patient comorbidities and complexities, as defined above, will have little effect on progress for prescribed plan of care.    PLAN (while hospitalized)  Suggestions for next session as indicated:   PT Frequency: DC PT      PT/OT Mobility Equipment for Discharge: no needs  PT/OT ADL Equipment for Discharge: pt to obtain shower chair from external vendor, no other needs identified     Interventions: bed mobility, functional transfer training, endurance training and gait training  Agreement to plan and goals: patient agrees with goals and treatment plan        GOALS  Review Date: 7/2/2025  Long Term Goals: (to be met by time of discharge from hospital)  Ambulation (even): Patient will ambulate on even surface for 200 feet with independent.   Status: met   Documented in the chart in the following areas:  Assessment/Plan.         Treatment Diagnosis: General weakness    The referring provider's electronic signature on the evaluation authorizes the therapy plan of care and certifies the need for these services, furnished under this plan of care while under their care.      Patient at End of Session:   Location: in chair  Safety measures: call light within reach, equipment intact and lines intact  Handoff to: nurse        Therapy procedure time and total treatment time can be found documented on the Time Entry flowsheet   I attest my time as attending is greater than GENIE time spent on qualifying patient care activities.